# Patient Record
Sex: MALE | Race: WHITE | NOT HISPANIC OR LATINO | Employment: OTHER | URBAN - METROPOLITAN AREA
[De-identification: names, ages, dates, MRNs, and addresses within clinical notes are randomized per-mention and may not be internally consistent; named-entity substitution may affect disease eponyms.]

---

## 2017-01-22 ENCOUNTER — HOSPITAL ENCOUNTER (EMERGENCY)
Facility: HOSPITAL | Age: 47
Discharge: HOME | End: 2017-01-22
Attending: EMERGENCY MEDICINE
Payer: COMMERCIAL

## 2017-01-22 VITALS
HEART RATE: 84 BPM | WEIGHT: 205 LBS | OXYGEN SATURATION: 95 % | SYSTOLIC BLOOD PRESSURE: 146 MMHG | BODY MASS INDEX: 31.07 KG/M2 | HEIGHT: 68 IN | RESPIRATION RATE: 18 BRPM | DIASTOLIC BLOOD PRESSURE: 90 MMHG | TEMPERATURE: 99 F

## 2017-01-22 DIAGNOSIS — N20.0 NEPHROLITHIASIS: Primary | ICD-10-CM

## 2017-01-22 DIAGNOSIS — N23 RENAL COLIC ON LEFT SIDE: ICD-10-CM

## 2017-01-22 LAB
BILIRUB UR QL STRIP: NEGATIVE
CLARITY UR: CLEAR
COLOR UR: NORMAL
GLUCOSE UR QL STRIP: NEGATIVE
HGB UR QL STRIP: NEGATIVE
KETONES UR QL STRIP: NEGATIVE
LEUKOCYTE ESTERASE UR QL STRIP: NEGATIVE
NITRITE UR QL STRIP: NEGATIVE
PH UR STRIP: 6 [PH] (ref 5–8)
PROT UR QL STRIP: NEGATIVE
SP GR UR STRIP: 1 (ref 1–1.03)
URN SPEC COLLECT METH UR: NORMAL
UROBILINOGEN UR STRIP-ACNC: NEGATIVE EU/DL

## 2017-01-22 PROCEDURE — 99284 EMERGENCY DEPT VISIT MOD MDM: CPT

## 2017-01-22 PROCEDURE — 81003 URINALYSIS AUTO W/O SCOPE: CPT

## 2017-01-22 PROCEDURE — 25000003 PHARM REV CODE 250: Performed by: PHYSICIAN ASSISTANT

## 2017-01-22 RX ORDER — TAMSULOSIN HYDROCHLORIDE 0.4 MG/1
0.4 CAPSULE ORAL DAILY
Qty: 10 CAPSULE | Refills: 0 | Status: SHIPPED | OUTPATIENT
Start: 2017-01-22 | End: 2018-09-18 | Stop reason: SDUPTHER

## 2017-01-22 RX ORDER — ONDANSETRON 4 MG/1
4 TABLET, ORALLY DISINTEGRATING ORAL
Status: COMPLETED | OUTPATIENT
Start: 2017-01-22 | End: 2017-01-22

## 2017-01-22 RX ORDER — OMEPRAZOLE 20 MG/1
20 CAPSULE, DELAYED RELEASE ORAL DAILY
COMMUNITY
End: 2018-04-11 | Stop reason: SDUPTHER

## 2017-01-22 RX ORDER — IBUPROFEN 200 MG
200 TABLET ORAL EVERY 6 HOURS PRN
COMMUNITY
End: 2018-12-18

## 2017-01-22 RX ORDER — HYDROCODONE BITARTRATE AND ACETAMINOPHEN 5; 325 MG/1; MG/1
1 TABLET ORAL EVERY 6 HOURS PRN
Qty: 10 TABLET | Refills: 0 | Status: SHIPPED | OUTPATIENT
Start: 2017-01-22 | End: 2018-04-11 | Stop reason: SDUPTHER

## 2017-01-22 RX ORDER — HYDROCODONE BITARTRATE AND ACETAMINOPHEN 10; 325 MG/1; MG/1
1 TABLET ORAL
Status: COMPLETED | OUTPATIENT
Start: 2017-01-22 | End: 2017-01-22

## 2017-01-22 RX ORDER — KETOROLAC TROMETHAMINE 10 MG/1
10 TABLET, FILM COATED ORAL EVERY 6 HOURS
Qty: 6 TABLET | Refills: 0 | Status: SHIPPED | OUTPATIENT
Start: 2017-01-22 | End: 2018-09-18 | Stop reason: SDUPTHER

## 2017-01-22 RX ADMIN — HYDROCODONE BITARTRATE AND ACETAMINOPHEN 1 TABLET: 10; 325 TABLET ORAL at 06:01

## 2017-01-22 RX ADMIN — ONDANSETRON 4 MG: 4 TABLET, ORALLY DISINTEGRATING ORAL at 06:01

## 2017-01-22 NOTE — ED TRIAGE NOTES
"Pt with c/o left low back pain that radiates to left low abdomen, frequent urination,  and nausea x 2 days.  Pt states, "I have a kidney stone."   "

## 2017-01-22 NOTE — ED AVS SNAPSHOT
[facility]  [place] [address]               Tommy Aquinolouisa Mobley.   2017  5:30 PM   ED    Description:  Male : 1970   Department:  [facility]           Your Care was Coordinated By:     Provider Role From To    Dionte Fowler MD Attending Provider [phone] --    Scott Davis PA-C Physician Assistant 17 --      Reason for Visit     Flank Pain           Diagnoses this Visit        Comments    Nephrolithiasis    -  Primary     Renal colic on left side           ED Disposition     None           To Do List           Follow-up Information     Go to [facility].    Specialty:  Emergency Medicine    Why:  If symptoms worsen    Contact information:    [place] [id number] [phone]        Go to Urology.    Why:  As already scheduled       These Medications        Disp Refills Start End    tamsulosin (FLOMAX) 0.4 mg Cp24 10 capsule 0 2017    Take 1 capsule (0.4 mg total) by mouth once daily. - Oral    Pharmacy: [facility] [place] - [address] Ph #: [phone]       ketorolac (TORADOL) 10 mg tablet 6 tablet 0 2017     Take 1 tablet (10 mg total) by mouth every 6 (six) hours. - Oral    Pharmacy: [facility] [place] - [address] Ph #: [phone]       hydrocodone-acetaminophen 5-325mg (NORCO) 5-325 mg per tablet 10 tablet 0 2017     Take 1 tablet by mouth every 6 (six) hours as needed for Pain. - Oral    Pharmacy: [facility] [place] - [address] Ph #: [phone]         [facility]     [facility] -  Assistance  Registered nurses in the [facility] provide clinical advisement, health education, appointment booking, and other advisory services.  Call for this free service at [phone].              Medications           Message regarding Medications     Verify the changes and/or additions to your medication regime listed below are the same as discussed with your clinician today.  If any of these changes or additions are incorrect, please notify your healthcare provider.        START taking these NEW medications        Refills    tamsulosin (FLOMAX) 0.4 mg Cp24 0    Sig: Take 1 capsule (0.4 mg total) by mouth once daily.    Class: Print    Route: Oral    ketorolac (TORADOL) 10 mg tablet 0    Sig: Take 1 tablet (10 mg total) by mouth every 6 (six) hours.    Class: Normal    Route: Oral    hydrocodone-acetaminophen 5-325mg (NORCO) 5-325 mg per tablet 0    Sig: Take 1 tablet by mouth every 6 (six) hours as needed for Pain.    Class: Print    Route: Oral      These medications were administered today        Dose Freq    ondansetron disintegrating tablet 4 mg 4 mg ED 1 Time    Sig: Take 1 tablet (4 mg total) by mouth ED 1 Time.    Class: Normal    Route: Oral    Cosign for Ordering: Required by Dionte Fowler MD    hydrocodone-acetaminophen 10-325mg per tablet 1 tablet 1 tablet ED 1 Time    Sig: Take 1 tablet by mouth ED 1 Time.    Class: Normal    Route: Oral    Cosign for Ordering: Required by Dionte Fowler MD      STOP taking these medications     allopurinol (ZYLOPRIM) 300 MG tablet TAKE ONE TABLET BY MOUTH EVERY DAY *TAKE WITH PLENTY WATER* *TAKE WITH SNACK IF UPSETS STOMACH*    clarithromycin (BIAXIN) 500 MG tablet ONE TABLET BY MOUTH EVERY 12 HOURS AFTER MEALS           Verify that the below list of medications is an accurate representation of the medications you are currently taking.  If none reported, the list may be blank. If incorrect, please contact your healthcare provider. Carry this list with you in case of emergency.           Current Medications     ibuprofen (ADVIL) 200 MG tablet Take 200 mg by mouth every 6 (six) hours as needed for Pain.    omeprazole (PRILOSEC) 20 MG capsule  "Take 20 mg by mouth once daily.    colchicine 0.6 mg tablet Take 1 tablet (0.6 mg total) by mouth once daily.    hydrocodone-acetaminophen 5-325mg (NORCO) 5-325 mg per tablet Take 1 tablet by mouth every 6 (six) hours as needed for Pain.    ketorolac (TORADOL) 10 mg tablet Take 1 tablet (10 mg total) by mouth every 6 (six) hours.    tamsulosin (FLOMAX) 0.4 mg Cp24 Take 1 capsule (0.4 mg total) by mouth once daily.           Clinical Reference Information           Your Vitals Were     BP Pulse Temp Resp Height Weight    175/93 (BP Location: Left arm, Patient Position: Sitting) 98 97.9 °F (36.6 °C) (Oral) 18 5' 8" (1.727 m) 93 kg (205 lb)    SpO2 BMI             97% 31.17 kg/m2         Allergies as of 1/22/2017        Reactions    Pcn [Penicillins] Rash      Immunizations Administered on Date of Encounter - 1/22/2017     None      ED Micro, Lab, POCT     Start Ordered       Status Ordering Provider    01/22/17 1647 01/22/17 1646  Urinalysis  STAT      Final result       ED Imaging Orders     Start Ordered       Status Ordering Provider    01/22/17 1652 01/22/17 1651  CT Abdomen Pelvis  Without Contrast  1 time imaging     Comments:  Hx of renal stones    Final result         Discharge Instructions       Please take the Toradol prescription as needed for pain.  Do not take ibuprofen with Toradol.  If Toradol does not manage your pain, you may take the hydrocodone with Tylenol.    Discharge References/Attachments     KIDNEY STONE W/ COLIC (ENGLISH)    KIDNEY STONE, PASSED (ENGLISH)    KIDNEY STONE, UNDESCENDED (NO SYMPTOMS) (ENGLISH)    KIDNEY STONES, TREATING: MEDICATIONS (ENGLISH)      [facility] Sign-Up     Activating your [facility] account is as easy as 1-2-3!     1) Visit [email or web address], select Sign Up Now, enter this activation code and your date of birth, then select Next.  Activation code not generated  Current Patient Portal Status: Account disabled      2) Create a username and password to use when you visit " [facility] in the future and select a security question in case you lose your password and select Next.    3) Enter your e-mail address and click Sign Up!    Additional Information  If you have questions, please e-mail [email or web address] or call [phone] to talk to our [facility] staff. Remember, [facility] is NOT to be used for urgent needs. For medical emergencies, dial 911.          [facility] complies with applicable Federal civil rights laws and does not discriminate on the basis of race, color, national origin, age, disability, or sex.        Language Assistance Services     ATTENTION: Language assistance services are available, free of charge. Please call [phone].      ATENCIÓN: Si jannala erlinda, tiene a fry disposición servicios gratuitos de asistencia lingüística. Llame al [phone].     CHÚ Ý: N?u b?n nói Ti?ng Vi?t, có các d?ch v? h? tr? ngôn ng? mi?n phí dành cho b?n. G?i s? [phone].

## 2017-01-23 NOTE — DISCHARGE INSTRUCTIONS
Please take the Toradol prescription as needed for pain.  Do not take ibuprofen with Toradol.  If Toradol does not manage your pain, you may take the hydrocodone with Tylenol.

## 2017-01-23 NOTE — ED PROVIDER NOTES
"Encounter Date: 1/22/2017    SCRIBE #1 NOTE: I, Kristienaya Russo, am scribing for, and in the presence of,  Scott Davis PA-C. I have scribed the following portions of the note - Other sections scribed: HPI and ROS.       History     Chief Complaint   Patient presents with    Flank Pain     L side flank pain, "Kidney stone." Hx of kidney stones.      Review of patient's allergies indicates:   Allergen Reactions    Pcn [penicillins] Rash     HPI Comments: CC: Back Pain    HPI: This 46 y.o. male with medical history of gout and nephrolithiasis presents to the ED c/o acute, severe (8/10) left lower back pain that began on Friday (1/20/17). Pt reports that the symptoms have since traveled to the left groin and notes that movement exacerbates the pain. He describes the symptoms as intermittent, noting that the pain improved yesterday, but returned today. Pt reports taking 2x tablets of Advil PTA to the ED. He also c/o nausea. Pt reports a history of kidney stones, noting that he has passed them in the past. He adds that he had an x-ray preformed on Friday after calling his Urologist, but notes that he has not received the results. Pt denies fever and recent injuries. No other associated symptoms.         The history is provided by the patient. No  was used.     Past Medical History   Diagnosis Date    Gout     Nephrolithiasis      No past medical history pertinent negatives.  Past Surgical History   Procedure Laterality Date    Hernia repair      Kidney surgery      Tonsillectomy       Family History   Problem Relation Age of Onset    Family history unknown: Yes     Social History   Substance Use Topics    Smoking status: Never Smoker    Smokeless tobacco: None    Alcohol use No     Review of Systems   Constitutional: Negative for fever.   HENT: Negative for sore throat.    Respiratory: Negative for shortness of breath.    Cardiovascular: Negative for chest pain.   Gastrointestinal: Positive " for nausea.   Genitourinary: Negative for dysuria.   Musculoskeletal: Positive for back pain (left lower; traveled to the left groin).        (+) left groin pain   Skin: Negative for rash.   Neurological: Negative for weakness.       Physical Exam   Initial Vitals   BP Pulse Resp Temp SpO2   01/22/17 1636 01/22/17 1636 01/22/17 1636 01/22/17 1636 01/22/17 1636   175/93 98 18 97.9 °F (36.6 °C) 97 %     Physical Exam    Vitals reviewed.  Constitutional: He appears well-developed and well-nourished. He is not diaphoretic. No distress.   HENT:   Head: Normocephalic and atraumatic.   Right Ear: External ear normal.   Left Ear: External ear normal.   Nose: Nose normal.   Eyes: Conjunctivae are normal. No scleral icterus.   Neck: Normal range of motion. Neck supple.   Cardiovascular: Normal rate, regular rhythm, normal heart sounds and intact distal pulses.   Pulmonary/Chest: Breath sounds normal. No respiratory distress. He has no wheezes. He has no rhonchi. He has no rales. He exhibits no tenderness.   Abdominal: Soft. He exhibits no distension and no mass. There is tenderness (minimal left lower quadrant). There is no rebound and no guarding.   No CVA tenderness.   Musculoskeletal: Normal range of motion.   Neurological: He is alert and oriented to person, place, and time.   Skin: Skin is warm and dry. No rash noted. No erythema.         ED Course   Procedures  Labs Reviewed   URINALYSIS             Medical Decision Making:   History:   Old Medical Records: I decided to obtain old medical records.    Emergent evaluation a 46-year-old male with history of recurrent kidney stones and gout complains of left flank pain and left lower quadrant pain waxing and waning ×2 days with associated nausea.  He denies dysuria, fever, vomiting.  He presents nontoxic, afebrile with stable vital signs.  He has mild left lower quadrant tenderness with no rebound or peritoneal signs.  There is no CVA tenderness.  Lungs sounds are clear  with normal work of breathing.  Heart sounds normal.  Skin perfusion normal.  Urinalysis has hematuria without evidence of infection.  I doubt pyelonephritis.  CT abdomen and pelvis without contrast shows multiple small nonobstructing bilateral nephrolithiasis in the renal pelvises and possibly one small stone in the left ureter.  No other obvious cause on imaging this point patient's pain. Patient given Norco and Zofran in the ED and discharged with Flomax, Toradol tablets, and Norco.  Patient was advised to drink plenty of fluids.  Patient explains he has urologist appointment in March.  I advised patient to call urology regarding ED visit for possible earlier appointment.  Patient given return precautions.  Case discussed with Dr. Fowler who agrees with assessment and plan.          Scribe Attestation:   Scribe #1: I performed the above scribed service and the documentation accurately describes the services I performed. I attest to the accuracy of the note.    Attending Attestation:           Physician Attestation for Scribe:  Physician Attestation Statement for Scribe #1: I, Scott Davis PA-C, reviewed documentation, as scribed by Kristie Russo in my presence, and it is both accurate and complete.                 ED Course     Clinical Impression:   The primary encounter diagnosis was Nephrolithiasis. A diagnosis of Renal colic on left side was also pertinent to this visit.          Scott Davis PA-C  01/22/17 2014

## 2017-02-17 DIAGNOSIS — M10.071 ACUTE IDIOPATHIC GOUT OF RIGHT FOOT: ICD-10-CM

## 2017-02-20 RX ORDER — COLCHICINE 0.6 MG/1
TABLET ORAL
Qty: 30 TABLET | Refills: 1 | Status: SHIPPED | OUTPATIENT
Start: 2017-02-20 | End: 2018-04-11 | Stop reason: SDUPTHER

## 2018-04-11 ENCOUNTER — OFFICE VISIT (OUTPATIENT)
Dept: FAMILY MEDICINE | Facility: CLINIC | Age: 48
End: 2018-04-11
Payer: COMMERCIAL

## 2018-04-11 VITALS
TEMPERATURE: 100 F | DIASTOLIC BLOOD PRESSURE: 80 MMHG | HEIGHT: 68 IN | WEIGHT: 208.56 LBS | OXYGEN SATURATION: 98 % | SYSTOLIC BLOOD PRESSURE: 116 MMHG | BODY MASS INDEX: 31.61 KG/M2 | HEART RATE: 86 BPM

## 2018-04-11 DIAGNOSIS — K21.9 GASTROESOPHAGEAL REFLUX DISEASE, ESOPHAGITIS PRESENCE NOT SPECIFIED: ICD-10-CM

## 2018-04-11 DIAGNOSIS — M10.9 ACUTE GOUT OF LEFT FOOT, UNSPECIFIED CAUSE: ICD-10-CM

## 2018-04-11 DIAGNOSIS — M10.071 ACUTE IDIOPATHIC GOUT OF RIGHT FOOT: ICD-10-CM

## 2018-04-11 DIAGNOSIS — Z00.00 ANNUAL PHYSICAL EXAM: Primary | ICD-10-CM

## 2018-04-11 DIAGNOSIS — R09.82 PND (POST-NASAL DRIP): ICD-10-CM

## 2018-04-11 PROCEDURE — 99999 PR PBB SHADOW E&M-EST. PATIENT-LVL III: CPT | Mod: PBBFAC,,, | Performed by: FAMILY MEDICINE

## 2018-04-11 PROCEDURE — 99396 PREV VISIT EST AGE 40-64: CPT | Mod: S$GLB,,, | Performed by: FAMILY MEDICINE

## 2018-04-11 RX ORDER — FLUTICASONE PROPIONATE 50 MCG
SPRAY, SUSPENSION (ML) NASAL
COMMUNITY
Start: 2018-03-26 | End: 2020-07-16 | Stop reason: ALTCHOICE

## 2018-04-11 RX ORDER — OMEPRAZOLE 40 MG/1
40 CAPSULE, DELAYED RELEASE ORAL DAILY
Qty: 90 CAPSULE | Refills: 3 | Status: SHIPPED | OUTPATIENT
Start: 2018-04-11 | End: 2019-04-17 | Stop reason: SDUPTHER

## 2018-04-11 RX ORDER — AZELASTINE 1 MG/ML
1 SPRAY, METERED NASAL 2 TIMES DAILY
Qty: 30 ML | Refills: 0 | Status: SHIPPED | OUTPATIENT
Start: 2018-04-11 | End: 2018-12-24

## 2018-04-11 RX ORDER — COLCHICINE 0.6 MG/1
0.6 TABLET ORAL DAILY
Qty: 30 TABLET | Refills: 1 | Status: SHIPPED | OUTPATIENT
Start: 2018-04-11 | End: 2018-12-24

## 2018-04-11 NOTE — PROGRESS NOTES
"Chief Complaint   Patient presents with    Medication Refill     SUBJECTIVE:   Tommy Lamar . is a 48 y.o. male presenting for his annual checkup.  Current Outpatient Prescriptions   Medication Sig Dispense Refill    colchicine 0.6 mg tablet Take 1 tablet (0.6 mg total) by mouth once daily. 30 tablet 1    fluticasone (FLONASE) 50 mcg/actuation nasal spray       ibuprofen (ADVIL) 200 MG tablet Take 200 mg by mouth every 6 (six) hours as needed for Pain.      ketorolac (TORADOL) 10 mg tablet Take 1 tablet (10 mg total) by mouth every 6 (six) hours. 6 tablet 0    omeprazole (PRILOSEC) 40 MG capsule Take 1 capsule (40 mg total) by mouth once daily. 90 capsule 3    azelastine (ASTELIN) 137 mcg (0.1 %) nasal spray 1 spray (137 mcg total) by Nasal route 2 (two) times daily. 30 mL 0    tamsulosin (FLOMAX) 0.4 mg Cp24 Take 1 capsule (0.4 mg total) by mouth once daily. 10 capsule 0     No current facility-administered medications for this visit.      Allergies: Pcn [penicillins]     ROS:  Feeling well. No dyspnea or chest pain on exertion. No abdominal pain, change in bowel habits, black or bloody stools. No urinary tract or prostatic symptoms. No neurological complaints.    OBJECTIVE:   The patient appears well, alert, oriented x 3, in no distress.   /80   Pulse 86   Temp 99.5 °F (37.5 °C) (Oral)   Ht 5' 8" (1.727 m)   Wt 94.6 kg (208 lb 8.9 oz)   SpO2 98%   BMI 31.71 kg/m²      Wt Readings from Last 15 Encounters:   04/11/18 94.6 kg (208 lb 8.9 oz)   01/22/17 93 kg (205 lb)   09/30/16 96.3 kg (212 lb 4.9 oz)   06/06/16 96.1 kg (211 lb 13.8 oz)   01/25/16 94.2 kg (207 lb 10.8 oz)   05/06/15 95.7 kg (211 lb)   02/12/15 97.1 kg (214 lb)   12/19/14 95.3 kg (210 lb)       ENT normal.  Neck supple. No adenopathy or thyromegaly. PARVEEN. Lungs are clear, good air entry, no wheezes, rhonchi or rales. S1 and S2 normal, no murmurs, regular rate and rhythm. Abdomen is soft without tenderness, guarding, mass or " organomegaly.  exam: deferred.  Extremities show no edema, normal peripheral pulses. Neurological is normal without focal findings.    ASSESSMENT:   1. Annual physical exam    2. Gastroesophageal reflux disease, esophagitis presence not specified    3. Acute gout of left foot, unspecified cause    4. Acute idiopathic gout of right foot    5. PND (post-nasal drip)        PLAN:   Tommy was seen today for medication refill.    Diagnoses and all orders for this visit:    Annual physical exam  -     CBC auto differential; Standing  -     Comprehensive metabolic panel; Standing  -     Hemoglobin A1c; Standing  -     Lipid panel; Standing  -     TSH; Standing  -     Uric acid; Future  -     Urinalysis; Standing  -     Testosterone Panel; Future    Gastroesophageal reflux disease, esophagitis presence not specified    Acute gout of left foot, unspecified cause    Acute idiopathic gout of right foot  -     colchicine 0.6 mg tablet; Take 1 tablet (0.6 mg total) by mouth once daily.    PND (post-nasal drip)  -     azelastine (ASTELIN) 137 mcg (0.1 %) nasal spray; 1 spray (137 mcg total) by Nasal route 2 (two) times daily.    Other orders  -     omeprazole (PRILOSEC) 40 MG capsule; Take 1 capsule (40 mg total) by mouth once daily.  -     Cancel: Tdap Vaccine  -     Cancel: Lipid panel; Future

## 2018-08-20 DIAGNOSIS — Z12.11 COLON CANCER SCREENING: ICD-10-CM

## 2018-08-30 ENCOUNTER — LAB VISIT (OUTPATIENT)
Dept: LAB | Facility: HOSPITAL | Age: 48
End: 2018-08-30
Attending: FAMILY MEDICINE
Payer: COMMERCIAL

## 2018-08-30 DIAGNOSIS — Z00.00 ANNUAL PHYSICAL EXAM: ICD-10-CM

## 2018-08-30 LAB
ALBUMIN SERPL BCP-MCNC: 3.8 G/DL
ALP SERPL-CCNC: 70 U/L
ALT SERPL W/O P-5'-P-CCNC: 20 U/L
ANION GAP SERPL CALC-SCNC: 10 MMOL/L
AST SERPL-CCNC: 16 U/L
BASOPHILS # BLD AUTO: 0.03 K/UL
BASOPHILS NFR BLD: 0.6 %
BILIRUB SERPL-MCNC: 0.6 MG/DL
BUN SERPL-MCNC: 15 MG/DL
CALCIUM SERPL-MCNC: 9.1 MG/DL
CHLORIDE SERPL-SCNC: 106 MMOL/L
CHOLEST SERPL-MCNC: 141 MG/DL
CHOLEST/HDLC SERPL: 3.5 {RATIO}
CO2 SERPL-SCNC: 23 MMOL/L
CREAT SERPL-MCNC: 1.2 MG/DL
DIFFERENTIAL METHOD: ABNORMAL
EOSINOPHIL # BLD AUTO: 0.1 K/UL
EOSINOPHIL NFR BLD: 2.5 %
ERYTHROCYTE [DISTWIDTH] IN BLOOD BY AUTOMATED COUNT: 12.5 %
EST. GFR  (AFRICAN AMERICAN): >60 ML/MIN/1.73 M^2
EST. GFR  (NON AFRICAN AMERICAN): >60 ML/MIN/1.73 M^2
ESTIMATED AVG GLUCOSE: 100 MG/DL
GLUCOSE SERPL-MCNC: 83 MG/DL
HBA1C MFR BLD HPLC: 5.1 %
HCT VFR BLD AUTO: 40 %
HDLC SERPL-MCNC: 40 MG/DL
HDLC SERPL: 28.4 %
HGB BLD-MCNC: 13.9 G/DL
LDLC SERPL CALC-MCNC: 77.6 MG/DL
LYMPHOCYTES # BLD AUTO: 1.7 K/UL
LYMPHOCYTES NFR BLD: 32.8 %
MCH RBC QN AUTO: 30.4 PG
MCHC RBC AUTO-ENTMCNC: 34.8 G/DL
MCV RBC AUTO: 88 FL
MONOCYTES # BLD AUTO: 0.4 K/UL
MONOCYTES NFR BLD: 8.5 %
NEUTROPHILS # BLD AUTO: 2.9 K/UL
NEUTROPHILS NFR BLD: 55.6 %
NONHDLC SERPL-MCNC: 101 MG/DL
PLATELET # BLD AUTO: 192 K/UL
PMV BLD AUTO: 9 FL
POTASSIUM SERPL-SCNC: 4.1 MMOL/L
PROT SERPL-MCNC: 7.7 G/DL
RBC # BLD AUTO: 4.57 M/UL
SODIUM SERPL-SCNC: 139 MMOL/L
TRIGL SERPL-MCNC: 117 MG/DL
TSH SERPL DL<=0.005 MIU/L-ACNC: 1.03 UIU/ML
URATE SERPL-MCNC: 10.3 MG/DL
WBC # BLD AUTO: 5.16 K/UL

## 2018-08-30 PROCEDURE — 84550 ASSAY OF BLOOD/URIC ACID: CPT

## 2018-08-30 PROCEDURE — 36415 COLL VENOUS BLD VENIPUNCTURE: CPT | Mod: PO

## 2018-08-30 PROCEDURE — 84443 ASSAY THYROID STIM HORMONE: CPT

## 2018-08-30 PROCEDURE — 84270 ASSAY OF SEX HORMONE GLOBUL: CPT

## 2018-08-30 PROCEDURE — 85025 COMPLETE CBC W/AUTO DIFF WBC: CPT

## 2018-08-30 PROCEDURE — 80053 COMPREHEN METABOLIC PANEL: CPT

## 2018-08-30 PROCEDURE — 80061 LIPID PANEL: CPT

## 2018-08-30 PROCEDURE — 82040 ASSAY OF SERUM ALBUMIN: CPT

## 2018-08-30 PROCEDURE — 83036 HEMOGLOBIN GLYCOSYLATED A1C: CPT

## 2018-08-31 ENCOUNTER — TELEPHONE (OUTPATIENT)
Dept: FAMILY MEDICINE | Facility: CLINIC | Age: 48
End: 2018-08-31

## 2018-08-31 NOTE — TELEPHONE ENCOUNTER
----- Message from Tracey Wilde sent at 8/31/2018  9:14 AM CDT -----  Contact: Self/ [phone]  Pt calling for lab results from yesterday's OV. Thank you.

## 2018-09-02 LAB
ALBUMIN SERPL-MCNC: 4.3 G/DL (ref 3.6–5.1)
SHBG SERPL-SCNC: 17 NMOL/L (ref 10–50)
TESTOST FREE SERPL-MCNC: 49.7 PG/ML (ref 46–224)
TESTOST SERPL-MCNC: 242 NG/DL (ref 250–1100)
TESTOSTERONE.FREE+WB SERPL-MCNC: 97.8 NG/DL (ref 110–575)

## 2018-09-04 ENCOUNTER — TELEPHONE (OUTPATIENT)
Dept: FAMILY MEDICINE | Facility: CLINIC | Age: 48
End: 2018-09-04

## 2018-09-04 NOTE — TELEPHONE ENCOUNTER
Called patient and informed to come in to go over results. Scheduled patient to f/u 9/18/18 @ 3pm. Informed patient that labs are not urgent. Patient was concerned.     Just FYI.

## 2018-09-04 NOTE — TELEPHONE ENCOUNTER
----- Message from Negro Bell MD sent at 9/2/2018 11:00 AM CDT -----  Make him and appointment to go over labs

## 2018-09-10 ENCOUNTER — OFFICE VISIT (OUTPATIENT)
Dept: FAMILY MEDICINE | Facility: CLINIC | Age: 48
End: 2018-09-10
Payer: COMMERCIAL

## 2018-09-10 VITALS
WEIGHT: 208.75 LBS | BODY MASS INDEX: 31.74 KG/M2 | SYSTOLIC BLOOD PRESSURE: 150 MMHG | HEART RATE: 72 BPM | OXYGEN SATURATION: 97 % | DIASTOLIC BLOOD PRESSURE: 84 MMHG

## 2018-09-10 DIAGNOSIS — M10.332 ACUTE GOUT DUE TO RENAL IMPAIRMENT INVOLVING LEFT WRIST: ICD-10-CM

## 2018-09-10 DIAGNOSIS — M10.9 ACUTE GOUT OF RIGHT FOOT, UNSPECIFIED CAUSE: Primary | ICD-10-CM

## 2018-09-10 DIAGNOSIS — Z71.2 ENCOUNTER TO DISCUSS TEST RESULTS: ICD-10-CM

## 2018-09-10 DIAGNOSIS — M1A.09X0 CHRONIC GOUT OF MULTIPLE SITES, UNSPECIFIED CAUSE: ICD-10-CM

## 2018-09-10 PROCEDURE — 99214 OFFICE O/P EST MOD 30 MIN: CPT | Mod: 25,S$GLB,, | Performed by: INTERNAL MEDICINE

## 2018-09-10 PROCEDURE — 96372 THER/PROPH/DIAG INJ SC/IM: CPT | Mod: S$GLB,,, | Performed by: INTERNAL MEDICINE

## 2018-09-10 PROCEDURE — 99999 PR PBB SHADOW E&M-EST. PATIENT-LVL III: CPT | Mod: PBBFAC,,, | Performed by: INTERNAL MEDICINE

## 2018-09-10 PROCEDURE — 3008F BODY MASS INDEX DOCD: CPT | Mod: CPTII,S$GLB,, | Performed by: INTERNAL MEDICINE

## 2018-09-10 RX ORDER — HYDROCODONE BITARTRATE AND ACETAMINOPHEN 5; 325 MG/1; MG/1
1 TABLET ORAL EVERY 6 HOURS PRN
Refills: 0 | COMMUNITY
Start: 2018-06-29 | End: 2018-12-18

## 2018-09-10 RX ORDER — TRAMADOL HYDROCHLORIDE 50 MG/1
50 TABLET ORAL EVERY 6 HOURS
Refills: 0 | COMMUNITY
Start: 2018-08-21 | End: 2018-12-18

## 2018-09-10 RX ORDER — KETOROLAC TROMETHAMINE 30 MG/ML
60 INJECTION, SOLUTION INTRAMUSCULAR; INTRAVENOUS
Status: COMPLETED | OUTPATIENT
Start: 2018-09-10 | End: 2018-09-10

## 2018-09-10 RX ORDER — METHYLPREDNISOLONE 4 MG/1
TABLET ORAL
Refills: 0 | COMMUNITY
Start: 2018-08-21 | End: 2018-09-18 | Stop reason: SDUPTHER

## 2018-09-10 RX ORDER — FEBUXOSTAT 40 MG/1
40 TABLET, FILM COATED ORAL DAILY
Qty: 90 TABLET | Refills: 1 | Status: SHIPPED | OUTPATIENT
Start: 2018-09-10 | End: 2018-09-18 | Stop reason: SDUPTHER

## 2018-09-10 RX ADMIN — KETOROLAC TROMETHAMINE 60 MG: 30 INJECTION, SOLUTION INTRAMUSCULAR; INTRAVENOUS at 03:09

## 2018-09-10 NOTE — PROGRESS NOTES
SUBJECTIVE     Chief Complaint   Patient presents with    Wrist Pain     L wrist pain. Went to . was given streroid injection. Helped but pain came back     Foot Pain     R top of foot pain       HPI  Tommy Lamar . is a 48 y.o. male with multiple medical diagnoses as listed in the medical history and problem list that presents for evaluation of L wrist pain x 2-3 weeks and R foot pain. Pt went to urgent care and had a negative xray. He told them he likely had gout, so he received a steroid shot, Toradol shot, and Medrol dose pack which he took to completion. The pain then resolved, but returned 2 days later. It is now accompanied by R foot pain that is achy at a 6/10. He has been taking Advil with some relief of pain.     PAST MEDICAL HISTORY:  Past Medical History:   Diagnosis Date    Gout     Nephrolithiasis        PAST SURGICAL HISTORY:  Past Surgical History:   Procedure Laterality Date    HERNIA REPAIR      KIDNEY SURGERY      TONSILLECTOMY         SOCIAL HISTORY:  Social History     Socioeconomic History    Marital status:      Spouse name: Not on file    Number of children: Not on file    Years of education: Not on file    Highest education level: Not on file   Social Needs    Financial resource strain: Not on file    Food insecurity - worry: Not on file    Food insecurity - inability: Not on file    Transportation needs - medical: Not on file    Transportation needs - non-medical: Not on file   Occupational History    Not on file   Tobacco Use    Smoking status: Never Smoker   Substance and Sexual Activity    Alcohol use: No    Drug use: Not on file    Sexual activity: Yes     Partners: Female     Birth control/protection: None   Other Topics Concern    Not on file   Social History Narrative    Not on file       FAMILY HISTORY:  Family History   Family history unknown: Yes       ALLERGIES AND MEDICATIONS: updated and reviewed.  Review of patient's allergies indicates:    Allergen Reactions    Pcn [penicillins] Rash     Current Outpatient Medications   Medication Sig Dispense Refill    azelastine (ASTELIN) 137 mcg (0.1 %) nasal spray 1 spray (137 mcg total) by Nasal route 2 (two) times daily. 30 mL 0    colchicine 0.6 mg tablet Take 1 tablet (0.6 mg total) by mouth once daily. 30 tablet 1    febuxostat (ULORIC) 40 mg Tab Take 1 tablet (40 mg total) by mouth once daily. 90 tablet 1    fluticasone (FLONASE) 50 mcg/actuation nasal spray       HYDROcodone-acetaminophen (NORCO) 5-325 mg per tablet Take 1 tablet by mouth every 6 (six) hours as needed.  0    ibuprofen (ADVIL) 200 MG tablet Take 200 mg by mouth every 6 (six) hours as needed for Pain.      ketorolac (TORADOL) 10 mg tablet Take 1 tablet (10 mg total) by mouth every 6 (six) hours. 6 tablet 0    methylPREDNISolone (MEDROL DOSEPACK) 4 mg tablet TAKE AS DIRECTED  0    omeprazole (PRILOSEC) 40 MG capsule Take 1 capsule (40 mg total) by mouth once daily. 90 capsule 3    tamsulosin (FLOMAX) 0.4 mg Cp24 Take 1 capsule (0.4 mg total) by mouth once daily. 10 capsule 0    traMADol (ULTRAM) 50 mg tablet Take 50 mg by mouth every 6 (six) hours.  0     No current facility-administered medications for this visit.        ROS  Review of Systems   Constitutional: Negative for chills and fever.   HENT: Negative for hearing loss and sore throat.    Eyes: Negative for visual disturbance.   Respiratory: Negative for cough and shortness of breath.    Cardiovascular: Negative for chest pain, palpitations and leg swelling.   Gastrointestinal: Negative for abdominal pain, constipation, diarrhea, nausea and vomiting.   Genitourinary: Negative for dysuria, frequency and urgency.   Musculoskeletal: Positive for arthralgias (L wrist and R foot pain). Negative for joint swelling and myalgias.   Skin: Negative for rash and wound.   Neurological: Negative for headaches.   Psychiatric/Behavioral: Negative for agitation and confusion. The patient  is not nervous/anxious.          OBJECTIVE     Physical Exam  Vitals:    09/10/18 1435   BP: (!) 150/84   Pulse: 72    Body mass index is 31.74 kg/m².  Weight: 94.7 kg (208 lb 12.4 oz)         Physical Exam   Constitutional: He is oriented to person, place, and time. He appears well-developed and well-nourished. No distress.   HENT:   Head: Normocephalic and atraumatic.   Right Ear: External ear normal.   Left Ear: External ear normal.   Nose: Nose normal.   Mouth/Throat: Oropharynx is clear and moist.   Eyes: Conjunctivae and EOM are normal. Right eye exhibits no discharge. Left eye exhibits no discharge. No scleral icterus.   Neck: Normal range of motion. Neck supple. No JVD present. No tracheal deviation present.   Pulmonary/Chest: Effort normal. No respiratory distress.   Musculoskeletal: Normal range of motion. He exhibits edema (L wrist) and tenderness. He exhibits no deformity.   Neurological: He is alert and oriented to person, place, and time. He exhibits normal muscle tone. Coordination normal.   Skin: Skin is warm and dry. No rash noted. There is erythema (Erythema to skin overlying 4th MTP).   Psychiatric: He has a normal mood and affect. His behavior is normal. Judgment and thought content normal.         Health Maintenance       Date Due Completion Date    TETANUS VACCINE 01/23/1986 ---    Colonoscopy 01/23/2018 ---    Influenza Vaccine 08/01/2018 ---    Lipid Panel 08/30/2023 8/30/2018            ASSESSMENT     48 y.o. male with     1. Acute gout of right foot, unspecified cause    2. Acute gout due to renal impairment involving left wrist    3. Chronic gout of multiple sites, unspecified cause    4. Encounter to discuss test results        PLAN:     1. Acute gout of right foot, unspecified cause  - Pt to start Indomethacin as prescribed by outside facility tomorrow, since Toradol given today  - ketorolac injection 60 mg; Inject 2 mLs (60 mg total) into the muscle one time.    2. Acute gout due to  renal impairment involving left wrist  - As above  - ketorolac injection 60 mg; Inject 2 mLs (60 mg total) into the muscle one time.    3. Chronic gout of multiple sites, unspecified cause  - Pt to start Uloric after acute flair resolved given elevated uric acid level of >10  - febuxostat (ULORIC) 40 mg Tab; Take 1 tablet (40 mg total) by mouth once daily.  Dispense: 90 tablet; Refill: 1    4. Encounter to discuss test results  - Discussed recent lab results  - All questions/concerns addressed  - Pt voiced understanding        RTC in 1-2 weeks as needed for any acute worsening of current condition or failure to improve       Radha Mills MD  09/10/2018 2:45 PM        No Follow-up on file.

## 2018-09-18 ENCOUNTER — OFFICE VISIT (OUTPATIENT)
Dept: FAMILY MEDICINE | Facility: CLINIC | Age: 48
End: 2018-09-18
Payer: COMMERCIAL

## 2018-09-18 VITALS
SYSTOLIC BLOOD PRESSURE: 128 MMHG | BODY MASS INDEX: 29.35 KG/M2 | HEART RATE: 90 BPM | WEIGHT: 205 LBS | OXYGEN SATURATION: 98 % | DIASTOLIC BLOOD PRESSURE: 66 MMHG | HEIGHT: 70 IN | TEMPERATURE: 98 F

## 2018-09-18 DIAGNOSIS — R10.9 RIGHT FLANK PAIN: Primary | ICD-10-CM

## 2018-09-18 DIAGNOSIS — R10.9 LEFT FLANK PAIN: ICD-10-CM

## 2018-09-18 DIAGNOSIS — R11.0 NAUSEA: ICD-10-CM

## 2018-09-18 DIAGNOSIS — Z12.11 SPECIAL SCREENING FOR MALIGNANT NEOPLASMS, COLON: ICD-10-CM

## 2018-09-18 PROCEDURE — 99213 OFFICE O/P EST LOW 20 MIN: CPT | Mod: S$GLB,,, | Performed by: FAMILY MEDICINE

## 2018-09-18 PROCEDURE — 99999 PR PBB SHADOW E&M-EST. PATIENT-LVL III: CPT | Mod: PBBFAC,,, | Performed by: FAMILY MEDICINE

## 2018-09-18 PROCEDURE — 3008F BODY MASS INDEX DOCD: CPT | Mod: CPTII,S$GLB,, | Performed by: FAMILY MEDICINE

## 2018-09-18 RX ORDER — INDOMETHACIN 50 MG/1
CAPSULE ORAL
Refills: 0 | COMMUNITY
Start: 2018-09-10 | End: 2018-09-18

## 2018-09-18 RX ORDER — ALLOPURINOL 300 MG/1
300 TABLET ORAL DAILY
Qty: 90 TABLET | Refills: 3 | Status: SHIPPED | OUTPATIENT
Start: 2018-09-18 | End: 2018-10-01 | Stop reason: ALTCHOICE

## 2018-09-18 RX ORDER — TAMSULOSIN HYDROCHLORIDE 0.4 MG/1
0.4 CAPSULE ORAL DAILY
Qty: 30 CAPSULE | Refills: 1 | Status: SHIPPED | OUTPATIENT
Start: 2018-09-18 | End: 2018-12-24

## 2018-09-18 RX ORDER — OMEPRAZOLE 10 MG/1
CAPSULE, DELAYED RELEASE ORAL
COMMUNITY
End: 2018-09-18

## 2018-09-18 RX ORDER — KETOROLAC TROMETHAMINE 10 MG/1
10 TABLET, FILM COATED ORAL EVERY 6 HOURS
Qty: 12 TABLET | Refills: 0 | Status: SHIPPED | OUTPATIENT
Start: 2018-09-18 | End: 2018-12-18

## 2018-09-18 NOTE — PROGRESS NOTES
"Chief Complaint   Patient presents with    Results     SUBJECTIVE:   Tommy Lamar Sr. is a 48 y.o. male presenting for his annual checkup, but really for his results from the kidney stone and with gout and he is slowly improving.  Current Outpatient Medications   Medication Sig Dispense Refill    azelastine (ASTELIN) 137 mcg (0.1 %) nasal spray 1 spray (137 mcg total) by Nasal route 2 (two) times daily. 30 mL 0    colchicine 0.6 mg tablet Take 1 tablet (0.6 mg total) by mouth once daily. 30 tablet 1    fluticasone (FLONASE) 50 mcg/actuation nasal spray       HYDROcodone-acetaminophen (NORCO) 5-325 mg per tablet Take 1 tablet by mouth every 6 (six) hours as needed.  0    ibuprofen (ADVIL) 200 MG tablet Take 200 mg by mouth every 6 (six) hours as needed for Pain.      ketorolac (TORADOL) 10 mg tablet Take 1 tablet (10 mg total) by mouth every 6 (six) hours. 12 tablet 0    omeprazole (PRILOSEC) 40 MG capsule Take 1 capsule (40 mg total) by mouth once daily. 90 capsule 3    traMADol (ULTRAM) 50 mg tablet Take 50 mg by mouth every 6 (six) hours.  0    allopurinol (ZYLOPRIM) 300 MG tablet Take 1 tablet (300 mg total) by mouth once daily. 90 tablet 3    tamsulosin (FLOMAX) 0.4 mg Cap Take 1 capsule (0.4 mg total) by mouth once daily. 30 capsule 1     No current facility-administered medications for this visit.      Allergies: Pcn [penicillins]     ROS:  Feeling well. No dyspnea or chest pain on exertion. No abdominal pain, change in bowel habits, black or bloody stools. No urinary tract or prostatic symptoms. No neurological complaints.    OBJECTIVE:   The patient appears well, alert, oriented x 3, in no distress.   /66   Pulse 90   Temp 98.4 °F (36.9 °C) (Oral)   Ht 5' 10" (1.778 m)   Wt 93 kg (205 lb 0.4 oz)   SpO2 98%   BMI 29.42 kg/m²   ENT normal.  Neck supple. No adenopathy or thyromegaly. PARVEEN. Lungs are clear, good air entry, no wheezes, rhonchi or rales. S1 and S2 normal, no murmurs, " regular rate and rhythm. Abdomen is soft without tenderness, guarding, mass or organomegaly.  exam: deferred.  Extremities show no edema, normal peripheral pulses. Neurological is normal without focal findings.    ASSESSMENT:   1. Right flank pain    2. Nausea    3. Left flank pain    4. Special screening for malignant neoplasms, colon        PLAN:     He is past the kindey stone and the nausea  We reviewed   F/u in 6 months for wellness.

## 2018-10-01 ENCOUNTER — OFFICE VISIT (OUTPATIENT)
Dept: FAMILY MEDICINE | Facility: CLINIC | Age: 48
End: 2018-10-01
Payer: COMMERCIAL

## 2018-10-01 VITALS
TEMPERATURE: 99 F | WEIGHT: 205 LBS | DIASTOLIC BLOOD PRESSURE: 88 MMHG | BODY MASS INDEX: 29.35 KG/M2 | HEART RATE: 78 BPM | RESPIRATION RATE: 18 BRPM | OXYGEN SATURATION: 97 % | HEIGHT: 70 IN | SYSTOLIC BLOOD PRESSURE: 134 MMHG

## 2018-10-01 DIAGNOSIS — M1A.09X0 CHRONIC GOUT OF MULTIPLE SITES, UNSPECIFIED CAUSE: ICD-10-CM

## 2018-10-01 DIAGNOSIS — M10.362 ACUTE GOUT DUE TO RENAL IMPAIRMENT INVOLVING LEFT KNEE: Primary | ICD-10-CM

## 2018-10-01 PROCEDURE — 3008F BODY MASS INDEX DOCD: CPT | Mod: CPTII,S$GLB,, | Performed by: INTERNAL MEDICINE

## 2018-10-01 PROCEDURE — 99214 OFFICE O/P EST MOD 30 MIN: CPT | Mod: 25,S$GLB,, | Performed by: INTERNAL MEDICINE

## 2018-10-01 PROCEDURE — 96372 THER/PROPH/DIAG INJ SC/IM: CPT | Mod: S$GLB,,, | Performed by: INTERNAL MEDICINE

## 2018-10-01 PROCEDURE — 99999 PR PBB SHADOW E&M-EST. PATIENT-LVL III: CPT | Mod: PBBFAC,,, | Performed by: INTERNAL MEDICINE

## 2018-10-01 RX ORDER — PREDNISONE 50 MG/1
50 TABLET ORAL DAILY
Qty: 3 TABLET | Refills: 0 | Status: SHIPPED | OUTPATIENT
Start: 2018-10-02 | End: 2018-10-05

## 2018-10-01 RX ORDER — KETOROLAC TROMETHAMINE 30 MG/ML
30 INJECTION, SOLUTION INTRAMUSCULAR; INTRAVENOUS
Status: COMPLETED | OUTPATIENT
Start: 2018-10-01 | End: 2018-10-01

## 2018-10-01 RX ADMIN — KETOROLAC TROMETHAMINE 30 MG: 30 INJECTION, SOLUTION INTRAMUSCULAR; INTRAVENOUS at 09:10

## 2018-10-01 NOTE — PROGRESS NOTES
Administered Toradol 30 mg IM to right ventrogluteal.  Patient tolerated injection well, no adverse reactions noted.

## 2018-10-01 NOTE — PROGRESS NOTES
SUBJECTIVE     Chief Complaint   Patient presents with    Knee Pain       HPI  Tommy Lamar Sr. is a 48 y.o. male with multiple medical diagnoses as listed in the medical history and problem list that presents for evaluation of L knee pain since Thursday. He denies any preceding trauma, falls, or heavy lifting. His pain is sharp, stabbing, stabbing, and throbbing at a 9/10 at rest. Pain increases to >10/10 with movement. Pt discontinued his Allopurinol and started Colchicine, Indomethacin, and Advil without relief of pain. Pt reports previous compliance with a low purine diet and Allopurinol.     PAST MEDICAL HISTORY:  Past Medical History:   Diagnosis Date    Gout     Nephrolithiasis        PAST SURGICAL HISTORY:  Past Surgical History:   Procedure Laterality Date    HERNIA REPAIR      KIDNEY SURGERY      TONSILLECTOMY         SOCIAL HISTORY:  Social History     Socioeconomic History    Marital status:      Spouse name: Not on file    Number of children: Not on file    Years of education: Not on file    Highest education level: Not on file   Social Needs    Financial resource strain: Not on file    Food insecurity - worry: Not on file    Food insecurity - inability: Not on file    Transportation needs - medical: Not on file    Transportation needs - non-medical: Not on file   Occupational History    Not on file   Tobacco Use    Smoking status: Never Smoker   Substance and Sexual Activity    Alcohol use: No    Drug use: Not on file    Sexual activity: Yes     Partners: Female     Birth control/protection: None   Other Topics Concern    Not on file   Social History Narrative    Not on file       FAMILY HISTORY:  Family History   Family history unknown: Yes       ALLERGIES AND MEDICATIONS: updated and reviewed.  Review of patient's allergies indicates:   Allergen Reactions    Pcn [penicillins] Rash     Current Outpatient Medications   Medication Sig Dispense Refill    azelastine  (ASTELIN) 137 mcg (0.1 %) nasal spray 1 spray (137 mcg total) by Nasal route 2 (two) times daily. 30 mL 0    colchicine 0.6 mg tablet Take 1 tablet (0.6 mg total) by mouth once daily. 30 tablet 1    fluticasone (FLONASE) 50 mcg/actuation nasal spray       HYDROcodone-acetaminophen (NORCO) 5-325 mg per tablet Take 1 tablet by mouth every 6 (six) hours as needed.  0    ibuprofen (ADVIL) 200 MG tablet Take 200 mg by mouth every 6 (six) hours as needed for Pain.      ketorolac (TORADOL) 10 mg tablet Take 1 tablet (10 mg total) by mouth every 6 (six) hours. 12 tablet 0    lesinurad-allopurinol 200-300 mg Tab Take 1 tablet by mouth once daily. 90 tablet 1    omeprazole (PRILOSEC) 40 MG capsule Take 1 capsule (40 mg total) by mouth once daily. 90 capsule 3    [START ON 10/2/2018] predniSONE (DELTASONE) 50 MG Tab Take 1 tablet (50 mg total) by mouth once daily. for 3 days 3 tablet 0    tamsulosin (FLOMAX) 0.4 mg Cap Take 1 capsule (0.4 mg total) by mouth once daily. 30 capsule 1    traMADol (ULTRAM) 50 mg tablet Take 50 mg by mouth every 6 (six) hours.  0     No current facility-administered medications for this visit.        ROS  Review of Systems   Constitutional: Negative for chills and fever.   HENT: Negative for hearing loss and sore throat.    Eyes: Negative for visual disturbance.   Respiratory: Negative for cough and shortness of breath.    Cardiovascular: Negative for chest pain, palpitations and leg swelling.   Gastrointestinal: Negative for abdominal pain, constipation, diarrhea, nausea and vomiting.   Genitourinary: Negative for dysuria, frequency and urgency.   Musculoskeletal: Positive for arthralgias (L knee) and joint swelling (L knee). Negative for myalgias.   Skin: Negative for rash and wound.   Neurological: Negative for headaches.   Psychiatric/Behavioral: Negative for agitation and confusion. The patient is not nervous/anxious.          OBJECTIVE     Physical Exam  Vitals:    10/01/18 0842  "  BP: 134/88   Pulse: 78   Resp: 18   Temp: 98.5 °F (36.9 °C)    Body mass index is 29.42 kg/m².  Weight: 93 kg (205 lb 0.4 oz)   Height: 5' 10" (177.8 cm)     Physical Exam   Constitutional: He is oriented to person, place, and time. He appears well-developed and well-nourished. No distress.   HENT:   Head: Normocephalic and atraumatic.   Right Ear: External ear normal.   Left Ear: External ear normal.   Nose: Nose normal.   Mouth/Throat: Oropharynx is clear and moist.   Eyes: Conjunctivae and EOM are normal. Right eye exhibits no discharge. Left eye exhibits no discharge. No scleral icterus.   Neck: Normal range of motion. Neck supple. No JVD present. No tracheal deviation present.   Pulmonary/Chest: Effort normal. No respiratory distress.   Musculoskeletal: Normal range of motion. He exhibits edema (L knee with mild edema) and tenderness (L medial knee). He exhibits no deformity.   R knee crepitus   Neurological: He is alert and oriented to person, place, and time. He exhibits normal muscle tone. Coordination normal.   Skin: Skin is warm and dry. No rash noted. No erythema.   L knee slightly more warm to touch   Psychiatric: He has a normal mood and affect. His behavior is normal. Judgment and thought content normal.         Health Maintenance       Date Due Completion Date    TETANUS VACCINE 01/23/1986 ---    Colonoscopy 01/23/2018 ---    Influenza Vaccine 08/01/2018 ---    Lipid Panel 08/30/2023 8/30/2018            ASSESSMENT     48 y.o. male with     1. Acute gout due to renal impairment involving left knee    2. Chronic gout of multiple sites, unspecified cause        PLAN:     1. Acute gout due to renal impairment involving left knee  - Will start a short steroid burst for acute gouty flare  - ketorolac injection 30 mg; Inject 1 mL (30 mg total) into the muscle one time.  - predniSONE (DELTASONE) 50 MG Tab; Take 1 tablet (50 mg total) by mouth once daily. for 3 days  Dispense: 3 tablet; Refill: 0    2. " Chronic gout of multiple sites, unspecified cause  - Pt to start Duzallo after acute flare up resolved; he has failed therapy with Allopurinol alone and could not afford Uloric  - lesinurad-allopurinol 200-300 mg Tab; Take 1 tablet by mouth once daily.  Dispense: 90 tablet; Refill: 1        RTC in 1-2 weeks as needed for any acute worsening of current condition or failure to improve       Radha Mills MD  10/01/2018 9:04 AM        No Follow-up on file.

## 2018-10-01 NOTE — MEDICAL/APP STUDENT
Subjective:       Patient ID: Tommy Lamar Sr. is a 48 y.o. male.    Chief Complaint: Knee Pain    Mr Lamar is a 48yoM with PMH of gout who presents to clinic this morning with L knee pain. The pain is similar in character to previous acute gout episodes. Pain began last Thursday while he was at work. Patient denies any trauma. Pain became increasingly more severe and similar to gout pain from previous episodes. Pain is described as sharp and throbbing in character, 9/10 in severity at rest, increasing to >10/10 with movement. Patient endorses swelling and decreased ROM. Patient stoped taking his allopurinol Thursday evening. Over the weekend he did take two pills of colchicine and Advil. Today he has taken indomethacin. He has had very little relief of his pain. Patient denies consumption of food high in uric acid (no red meat, no crawfish) or alcohol.      Review of Systems   Constitutional: Negative for appetite change, chills and fever.   HENT: Negative for congestion and sore throat.    Respiratory: Negative for cough and chest tightness.    Cardiovascular: Negative for chest pain and palpitations.   Gastrointestinal: Negative for abdominal pain, constipation, diarrhea, nausea and vomiting.   Genitourinary: Negative for decreased urine volume and dysuria.   Musculoskeletal: Positive for arthralgias and joint swelling.   Skin: Negative for color change and rash.   Neurological: Negative for dizziness, light-headedness and headaches.   Psychiatric/Behavioral: Negative for agitation and behavioral problems.       Past Medical History:   Diagnosis Date    Gout     Nephrolithiasis      Past Surgical History:   Procedure Laterality Date    HERNIA REPAIR      KIDNEY SURGERY      TONSILLECTOMY       Review of patient's allergies indicates:   Allergen Reactions    Pcn [penicillins] Rash       Objective:       Vitals:    10/01/18 0842   BP: 134/88   Pulse: 78   Resp: 18   Temp: 98.5 °F (36.9 °C)      Physical Exam   Constitutional: He is oriented to person, place, and time. He appears well-developed and well-nourished. No distress.   HENT:   Head: Normocephalic and atraumatic.   Cardiovascular: Normal rate, regular rhythm and normal heart sounds.   Pulmonary/Chest: Effort normal and breath sounds normal.   Musculoskeletal:        Right knee: Normal.        Left knee: He exhibits decreased range of motion. He exhibits no effusion, no ecchymosis, no laceration and no erythema. Tenderness found. Patellar tendon tenderness noted.   Neurological: He is alert and oriented to person, place, and time.   Skin: Skin is warm and dry. No rash noted. He is not diaphoretic. No erythema.   Psychiatric: He has a normal mood and affect.       Assessment:       1. Acute gout due to renal impairment involving left knee    2. Chronic gout of multiple sites, unspecified cause        Plan:      Acute Gout, Left Knee  - Prednisone 50 mg PO for 3 days  - Ketorolac 30mg injection in office today for pain  - On resolution of acute attack, begin lesinurad-allopurinol 200-300 mg combined tablet once per day.   - Counseled patient regarding future attacks:   -Take 2 colchicine tablets in the morning. Take and additional 1 tablet an hour later.   -Take indomethacin PRN for pain   -Discontinue lesinurad-allopurinol  - Continue diet low in uric acid (avoidance of red meats, seafoods  and alcohol).     -RTC if no improvement.      Antonia Matute, MS4

## 2018-10-08 ENCOUNTER — TELEPHONE (OUTPATIENT)
Dept: FAMILY MEDICINE | Facility: CLINIC | Age: 48
End: 2018-10-08

## 2018-10-08 NOTE — TELEPHONE ENCOUNTER
----- Message from Negro Bell MD sent at 9/29/2018 12:43 PM CDT -----  Schedule 6 month recall, thank you!

## 2018-12-18 ENCOUNTER — HOSPITAL ENCOUNTER (EMERGENCY)
Facility: HOSPITAL | Age: 48
Discharge: HOME | End: 2018-12-18
Attending: EMERGENCY MEDICINE
Payer: COMMERCIAL

## 2018-12-18 VITALS
TEMPERATURE: 98 F | RESPIRATION RATE: 16 BRPM | DIASTOLIC BLOOD PRESSURE: 87 MMHG | SYSTOLIC BLOOD PRESSURE: 134 MMHG | HEART RATE: 100 BPM | OXYGEN SATURATION: 97 %

## 2018-12-18 DIAGNOSIS — I10 HYPERTENSION, UNSPECIFIED TYPE: ICD-10-CM

## 2018-12-18 DIAGNOSIS — I47.10 SVT (SUPRAVENTRICULAR TACHYCARDIA): Primary | ICD-10-CM

## 2018-12-18 DIAGNOSIS — R00.2 PALPITATIONS: ICD-10-CM

## 2018-12-18 LAB
AMPHET+METHAMPHET UR QL: NEGATIVE
ANION GAP SERPL CALC-SCNC: 14 MMOL/L (ref 8–16)
BARBITURATES UR QL SCN>200 NG/ML: NEGATIVE
BASOPHILS # BLD AUTO: 0.03 K/UL
BASOPHILS NFR BLD: 0.3 %
BENZODIAZ UR QL SCN>200 NG/ML: NEGATIVE
BUN SERPL-MCNC: 19 MG/DL (ref 6–30)
BZE UR QL SCN: NEGATIVE
CANNABINOIDS UR QL SCN: NEGATIVE
CHLORIDE SERPL-SCNC: 105 MMOL/L (ref 95–110)
CREAT SERPL-MCNC: 1.3 MG/DL (ref 0.5–1.4)
CREAT UR-MCNC: 18 MG/DL
DIFFERENTIAL METHOD: ABNORMAL
EOSINOPHIL # BLD AUTO: 0.1 K/UL
EOSINOPHIL NFR BLD: 1.1 %
ERYTHROCYTE [DISTWIDTH] IN BLOOD BY AUTOMATED COUNT: 12.5 %
GLUCOSE SERPL-MCNC: 114 MG/DL (ref 70–110)
HCT VFR BLD AUTO: 42.9 %
HCT VFR BLD CALC: 39 %PCV (ref 36–54)
HGB BLD-MCNC: 14.9 G/DL
LYMPHOCYTES # BLD AUTO: 2.8 K/UL
LYMPHOCYTES NFR BLD: 31.5 %
MCH RBC QN AUTO: 30.3 PG
MCHC RBC AUTO-ENTMCNC: 34.7 G/DL
MCV RBC AUTO: 87 FL
METHADONE UR QL SCN>300 NG/ML: NEGATIVE
MONOCYTES # BLD AUTO: 0.7 K/UL
MONOCYTES NFR BLD: 7.6 %
NEUTROPHILS # BLD AUTO: 5.3 K/UL
NEUTROPHILS NFR BLD: 59.5 %
OPIATES UR QL SCN: NEGATIVE
PCP UR QL SCN>25 NG/ML: NEGATIVE
PLATELET # BLD AUTO: 197 K/UL
PMV BLD AUTO: 9.1 FL
POC IONIZED CALCIUM: 1.24 MMOL/L (ref 1.06–1.42)
POC TCO2 (MEASURED): 30 MMOL/L (ref 23–29)
POTASSIUM BLD-SCNC: 3.7 MMOL/L (ref 3.5–5.1)
RBC # BLD AUTO: 4.92 M/UL
SAMPLE: ABNORMAL
SODIUM BLD-SCNC: 144 MMOL/L (ref 136–145)
TOXICOLOGY INFORMATION: ABNORMAL
TSH SERPL DL<=0.005 MIU/L-ACNC: 1.53 UIU/ML
WBC # BLD AUTO: 8.96 K/UL

## 2018-12-18 PROCEDURE — 99285 EMERGENCY DEPT VISIT HI MDM: CPT | Mod: 25

## 2018-12-18 PROCEDURE — 82565 ASSAY OF CREATININE: CPT

## 2018-12-18 PROCEDURE — 99900035 HC TECH TIME PER 15 MIN (STAT)

## 2018-12-18 PROCEDURE — 93010 ELECTROCARDIOGRAM REPORT: CPT | Mod: ,,, | Performed by: INTERNAL MEDICINE

## 2018-12-18 PROCEDURE — 96361 HYDRATE IV INFUSION ADD-ON: CPT

## 2018-12-18 PROCEDURE — 84443 ASSAY THYROID STIM HORMONE: CPT

## 2018-12-18 PROCEDURE — 84295 ASSAY OF SERUM SODIUM: CPT

## 2018-12-18 PROCEDURE — 96374 THER/PROPH/DIAG INJ IV PUSH: CPT

## 2018-12-18 PROCEDURE — 63600175 PHARM REV CODE 636 W HCPCS: Performed by: EMERGENCY MEDICINE

## 2018-12-18 PROCEDURE — 25000003 PHARM REV CODE 250: Performed by: PHYSICIAN ASSISTANT

## 2018-12-18 PROCEDURE — 82330 ASSAY OF CALCIUM: CPT

## 2018-12-18 PROCEDURE — 25000003 PHARM REV CODE 250: Performed by: EMERGENCY MEDICINE

## 2018-12-18 PROCEDURE — 80307 DRUG TEST PRSMV CHEM ANLYZR: CPT

## 2018-12-18 PROCEDURE — 85025 COMPLETE CBC W/AUTO DIFF WBC: CPT

## 2018-12-18 PROCEDURE — 84132 ASSAY OF SERUM POTASSIUM: CPT

## 2018-12-18 PROCEDURE — 85014 HEMATOCRIT: CPT

## 2018-12-18 PROCEDURE — 93005 ELECTROCARDIOGRAM TRACING: CPT

## 2018-12-18 RX ORDER — ADENOSINE 3 MG/ML
12 INJECTION, SOLUTION INTRAVENOUS
Status: DISCONTINUED | OUTPATIENT
Start: 2018-12-18 | End: 2018-12-18

## 2018-12-18 RX ORDER — POTASSIUM CHLORIDE 750 MG/1
50 TABLET, EXTENDED RELEASE ORAL
Status: COMPLETED | OUTPATIENT
Start: 2018-12-18 | End: 2018-12-18

## 2018-12-18 RX ORDER — ASPIRIN 325 MG
325 TABLET ORAL
Status: COMPLETED | OUTPATIENT
Start: 2018-12-18 | End: 2018-12-18

## 2018-12-18 RX ORDER — METOPROLOL TARTRATE 25 MG/1
12.5 TABLET, FILM COATED ORAL 2 TIMES DAILY
Qty: 30 TABLET | Refills: 0 | Status: SHIPPED | OUTPATIENT
Start: 2018-12-18 | End: 2018-12-24

## 2018-12-18 RX ORDER — ADENOSINE 3 MG/ML
6 INJECTION, SOLUTION INTRAVENOUS
Status: COMPLETED | OUTPATIENT
Start: 2018-12-18 | End: 2018-12-18

## 2018-12-18 RX ADMIN — SODIUM CHLORIDE 1000 ML: 0.9 INJECTION, SOLUTION INTRAVENOUS at 01:12

## 2018-12-18 RX ADMIN — ADENOSINE 6 MG: 3 INJECTION, SOLUTION INTRAVENOUS at 01:12

## 2018-12-18 RX ADMIN — POTASSIUM CHLORIDE 50 MEQ: 750 TABLET, EXTENDED RELEASE ORAL at 02:12

## 2018-12-18 RX ADMIN — ASPIRIN 325 MG ORAL TABLET 325 MG: 325 PILL ORAL at 01:12

## 2018-12-18 NOTE — ED PROVIDER NOTES
"Encounter Date: 12/18/2018       History     Chief Complaint   Patient presents with    Palpitations     states while working felt like "my heart started racing".       48 y.o. male works as a , denies etoh/drugs/recent illness, notes that all of the sudden his heart started to race. Denies f/c,n/v, diarrhea/dysuria. Denies cp/sob/recent illness          Review of patient's allergies indicates:   Allergen Reactions    Pcn [penicillins] Rash     Past Medical History:   Diagnosis Date    Gout     Nephrolithiasis      Past Surgical History:   Procedure Laterality Date    HERNIA REPAIR      KIDNEY SURGERY      TONSILLECTOMY       Family History   Family history unknown: Yes     Social History     Tobacco Use    Smoking status: Never Smoker   Substance Use Topics    Alcohol use: No    Drug use: Not on file     Review of Systems   Constitutional: Negative for fever.   HENT: Negative for sore throat.    Respiratory: Negative for shortness of breath.    Cardiovascular: Positive for palpitations. Negative for chest pain.   Gastrointestinal: Negative for nausea.   Genitourinary: Negative for dysuria.   Musculoskeletal: Negative for back pain.   Skin: Negative for rash.   Neurological: Negative for weakness.   Hematological: Does not bruise/bleed easily.   All other systems reviewed and are negative.      Physical Exam     Initial Vitals [12/18/18 1314]   BP Pulse Resp Temp SpO2   (!) 158/84 (!) 219 -- -- --      MAP       --         Physical Exam    Nursing note and vitals reviewed.  Constitutional: He appears well-developed and well-nourished.   HENT:   Head: Normocephalic and atraumatic.   Eyes: EOM are normal. Pupils are equal, round, and reactive to light.   Pulmonary/Chest: Effort normal.   Abdominal: He exhibits no distension.   Musculoskeletal: He exhibits no edema or tenderness.   Neurological: He is alert and oriented to person, place, and time. No cranial nerve deficit.   Skin: Skin is warm and dry. "   Psychiatric: He has a normal mood and affect.     tachy no m HR>200    ED Course   Cardioversion  Date/Time: 12/18/2018 1:28 PM  Location procedure was performed: [facility] EMERGENCY DEPARTMENT  Performed by: Jeffrey Salinas MD  Authorized by: Jeffrey Salinas MD   Pre-operative diagnosis: svt  Post-operative diagnosis: svt  Consent Done: Emergent Situation  Patient sedated: no  Cardioversion basis: emergent  Pre-procedure rhythm: supraventricular tachycardia  Patient position: patient was placed in a supine position  Chest area: chest area exposed  Electrodes: pads  Number of attempts: 1  Attempt 1 outcome: conversion to normal sinus rhythm  Post-procedure rhythm: sinus tachycardia  Complications: no complications  Patient tolerance: Patient tolerated the procedure well with no immediate complications  Technical procedures used: Adenosine 6mg IVP  Comments: Chemical cardioversion        Labs Reviewed   DRUG SCREEN PANEL, URINE EMERGENCY   CBC W/ AUTO DIFFERENTIAL   ISTAT CHEM8     EKG Readings: (Independently Interpreted)   Hr 114 sinus tach, Lant fascicular block, no elva/twi. Non acute       Imaging Results    None          Medical Decision Making:   Initial Assessment:   Pt in SVT.   Differential Diagnosis:   Will write for adenosine              Attending Attestation:         Attending Critical Care:   Critical Care Times:   Direct Patient Care (initial evaluation, reassessments, and time considering the case)................................................................10 minutes.   Additional History from reviewing old medical records or taking additional history from the family, EMS, PCP, etc.......................5 minutes.   Ordering, Reviewing, and Interpreting Diagnostic Studies...............................................................................................................10 minutes.    Documentation..................................................................................................................................................................................10 minutes.   ==============================================================  · Total Critical Care Time - exclusive of procedural time: 35 minutes.  ==============================================================                 Clinical Impression:   The primary encounter diagnosis was SVT (supraventricular tachycardia). Diagnoses of Palpitations and Hypertension, unspecified type were also pertinent to this visit.                             Jeffrey Salinas MD  12/18/18 3914       Jeffrey Salinas MD  01/07/19 [id number]

## 2018-12-18 NOTE — ED TRIAGE NOTES
Pt arrived to ED via personal transport with c/o palpitations x 1 hr. Pt denies CP, lightheadedness, SOB. He is aao x 4 and in no apparent distress.

## 2018-12-18 NOTE — ED NOTES
Dr. Salinas at bedside. Pt on life elva. 2 18g IVs. Myself and Sherron Michelle at bedside. Administered 6mg of Adenosine.

## 2018-12-18 NOTE — PROGRESS NOTES
Results for MARIA MOSLEY SR. (MRN [id number]) as of 12/18/2018 14:00   Ref. Range 12/18/2018 13:51   POC Glucose Latest Ref Range: 70 - 110 mg/dL 114 (H)   POC Creatinine Latest Ref Range: 0.5 - 1.4 mg/dL 1.3   POC BUN Latest Ref Range: 6 - 30 mg/dL 19   POC Chloride Latest Ref Range: 95 - 110 mmol/L 105   POC Sodium Latest Ref Range: 136 - 145 mmol/L 144   POC Potassium Latest Ref Range: 3.5 - 5.1 mmol/L 3.7   POC Ionized Calcium Latest Ref Range: 1.06 - 1.42 mmol/L 1.24   POC Hematocrit Latest Ref Range: 36 - 54 %PCV 39   POC TCO2 (MEASURED) Latest Ref Range: 23 - 29 mmol/L 30 (H)   Sample Unknown VENOUS   POC Anion Gap Latest Ref Range: 8 - 16 mmol/L 14

## 2018-12-18 NOTE — ED NOTES
Bed: 03main  Expected date:   Expected time:   Means of arrival:   Comments:  CODE TERENCE Cates RN  12/18/18 5586

## 2018-12-21 ENCOUNTER — TELEPHONE (OUTPATIENT)
Dept: FAMILY MEDICINE | Facility: CLINIC | Age: 48
End: 2018-12-21

## 2018-12-21 NOTE — TELEPHONE ENCOUNTER
----- Message from Susana Hardin sent at 12/21/2018  2:37 PM CST -----  Contact: Self  Pt is calling to speak with staff regarding some questions about his ED visit. Please call pt at [phone]

## 2018-12-21 NOTE — TELEPHONE ENCOUNTER
Patient came in . He was seen in the ER on 12/18/18 was put on Metoprolol 25mg. Patient states that is it making him very sleepy and sluggish. He checked his pulse while on the medication an stated that it was in the mid 50's  And was concerned. He has decreased amount to 1/2 pill a day and I informed to keep up with vitals. Patient was not in distress at all just more so concerned. Scheduled patient to be seen on 12/24/18 with provider but in the meantime he will stick to 1/2 tablet a day until he follow up     Any suggestions in the meantime? Should patient d/c meds all together?  Please Advise

## 2018-12-24 ENCOUNTER — OFFICE VISIT (OUTPATIENT)
Dept: FAMILY MEDICINE | Facility: CLINIC | Age: 48
End: 2018-12-24
Payer: COMMERCIAL

## 2018-12-24 VITALS
OXYGEN SATURATION: 98 % | TEMPERATURE: 98 F | SYSTOLIC BLOOD PRESSURE: 140 MMHG | WEIGHT: 210.13 LBS | DIASTOLIC BLOOD PRESSURE: 92 MMHG | BODY MASS INDEX: 30.15 KG/M2 | HEART RATE: 60 BPM

## 2018-12-24 DIAGNOSIS — R03.0 ELEVATED BLOOD PRESSURE READING: ICD-10-CM

## 2018-12-24 DIAGNOSIS — I47.10 PAROXYSMAL SVT (SUPRAVENTRICULAR TACHYCARDIA): Primary | ICD-10-CM

## 2018-12-24 PROCEDURE — 99999 PR PBB SHADOW E&M-EST. PATIENT-LVL III: CPT | Mod: PBBFAC,,, | Performed by: INTERNAL MEDICINE

## 2018-12-24 PROCEDURE — 99213 OFFICE O/P EST LOW 20 MIN: CPT | Mod: S$GLB,,, | Performed by: INTERNAL MEDICINE

## 2018-12-24 PROCEDURE — 3008F BODY MASS INDEX DOCD: CPT | Mod: CPTII,S$GLB,, | Performed by: INTERNAL MEDICINE

## 2018-12-24 RX ORDER — PROMETHAZINE HYDROCHLORIDE AND CODEINE PHOSPHATE 6.25; 1 MG/5ML; MG/5ML
SOLUTION ORAL
Refills: 1 | COMMUNITY
Start: 2018-12-20 | End: 2018-12-24

## 2018-12-24 RX ORDER — AZITHROMYCIN 250 MG/1
TABLET, FILM COATED ORAL
Refills: 0 | COMMUNITY
Start: 2018-12-03 | End: 2018-12-24

## 2018-12-24 RX ORDER — METOPROLOL TARTRATE 25 MG/1
12.5 TABLET, FILM COATED ORAL NIGHTLY
Qty: 30 TABLET | Refills: 0
Start: 2018-12-24 | End: 2019-01-09 | Stop reason: SDUPTHER

## 2018-12-24 NOTE — PROGRESS NOTES
SUBJECTIVE     Chief Complaint   Patient presents with    Hypertension    Follow-up    Medication Problem     Cut metoprolol 1/2 tablet at night. Was not feeling good on the full 25mg       HPI  Tommy Lamar Sr. is a 48 y.o. male with multiple medical diagnoses as listed in the medical history and problem list that presents for follow-up after recent ED visit on 12/18/18 after experiencing palpitations at work. He went to the ED because he started getting nervous and had a HR of 230s. Pt was diagnosed with SVT and received chemical cardioversion with Adenosine. He was started on Metroprolol 12.5 mg BID. He then started to feel lethargic with bradycardia down to the 50s, so he has been taking only 12.5 mg qhs. Pt denies any complications/adverse reactions with taking 1 tab daily. He has no further palpitations.    PAST MEDICAL HISTORY:  Past Medical History:   Diagnosis Date    Gout     Nephrolithiasis        PAST SURGICAL HISTORY:  Past Surgical History:   Procedure Laterality Date    HERNIA REPAIR      KIDNEY SURGERY      TONSILLECTOMY         SOCIAL HISTORY:  Social History     Socioeconomic History    Marital status:      Spouse name: Not on file    Number of children: Not on file    Years of education: Not on file    Highest education level: Not on file   Social Needs    Financial resource strain: Not on file    Food insecurity - worry: Not on file    Food insecurity - inability: Not on file    Transportation needs - medical: Not on file    Transportation needs - non-medical: Not on file   Occupational History    Not on file   Tobacco Use    Smoking status: Never Smoker    Smokeless tobacco: Never Used   Substance and Sexual Activity    Alcohol use: No    Drug use: No    Sexual activity: Yes     Partners: Female     Birth control/protection: None   Other Topics Concern    Not on file   Social History Narrative    Not on file       FAMILY HISTORY:  Family History   Family  history unknown: Yes       ALLERGIES AND MEDICATIONS: updated and reviewed.  Review of patient's allergies indicates:   Allergen Reactions    Pcn [penicillins] Rash     Current Outpatient Medications   Medication Sig Dispense Refill    fluticasone (FLONASE) 50 mcg/actuation nasal spray       FLUZONE QUAD 9705-6890, PF, 60 mcg (15 mcg x 4)/0.5 mL Syrg TO BE ADMINISTERED BY PHARMACIST FOR IMMUNIZATION  0    metoprolol tartrate (LOPRESSOR) 25 MG tablet Take 0.5 tablets (12.5 mg total) by mouth every evening. 30 tablet 0    omeprazole (PRILOSEC) 40 MG capsule Take 1 capsule (40 mg total) by mouth once daily. 90 capsule 3     No current facility-administered medications for this visit.        ROS  Review of Systems   Constitutional: Negative for chills and fever.   HENT: Negative for hearing loss and sore throat.    Eyes: Negative for visual disturbance.   Respiratory: Negative for cough and shortness of breath.    Cardiovascular: Negative for chest pain, palpitations and leg swelling.   Gastrointestinal: Negative for abdominal pain, constipation, diarrhea, nausea and vomiting.   Genitourinary: Negative for dysuria, frequency and urgency.   Musculoskeletal: Negative for arthralgias, joint swelling and myalgias.   Skin: Negative for rash and wound.   Neurological: Positive for headaches.   Psychiatric/Behavioral: Negative for agitation and confusion. The patient is not nervous/anxious.          OBJECTIVE     Physical Exam  Vitals:    12/24/18 1038   BP: (!) 140/92   Pulse: 60   Temp: 98.3 °F (36.8 °C)    Body mass index is 30.15 kg/m².  Weight: 95.3 kg (210 lb 1.6 oz)         Physical Exam   Constitutional: He is oriented to person, place, and time. He appears well-developed and well-nourished. No distress.   HENT:   Head: Normocephalic and atraumatic.   Right Ear: External ear normal.   Left Ear: External ear normal.   Nose: Nose normal.   Mouth/Throat: Oropharynx is clear and moist.   Eyes: Conjunctivae and EOM are  normal. Right eye exhibits no discharge. Left eye exhibits no discharge. No scleral icterus.   Neck: Normal range of motion. Neck supple. No JVD present. No tracheal deviation present.   Cardiovascular: Normal rate, regular rhythm, normal heart sounds and intact distal pulses. Exam reveals no gallop and no friction rub.   No murmur heard.  Pulmonary/Chest: Effort normal and breath sounds normal. No respiratory distress. He has no wheezes.   Abdominal: Soft. Bowel sounds are normal. He exhibits no distension and no mass. There is no tenderness. There is no rebound and no guarding.   Musculoskeletal: Normal range of motion. He exhibits no edema, tenderness or deformity.   Neurological: He is alert and oriented to person, place, and time. He exhibits normal muscle tone. Coordination normal.   Skin: Skin is warm and dry. No rash noted. No erythema.   Psychiatric: He has a normal mood and affect. His behavior is normal. Judgment and thought content normal.         Health Maintenance       Date Due Completion Date    TETANUS VACCINE 01/23/1986 ---    Colonoscopy 01/23/2018 ---    Influenza Vaccine 08/01/2018 ---    Lipid Panel 08/30/2023 8/30/2018            ASSESSMENT     48 y.o. male with     1. Paroxysmal SVT (supraventricular tachycardia)    2. Elevated blood pressure reading        PLAN:     1. Paroxysmal SVT (supraventricular tachycardia)  - Resolved  - The current medical regimen is effective;  continue present plan and medications.  - Reviewed lab from recent ED visit  - Ambulatory referral to Cardiology  - metoprolol tartrate (LOPRESSOR) 25 MG tablet; Take 0.5 tablets (12.5 mg total) by mouth every evening.  Dispense: 30 tablet; Refill: 0    2. Elevated blood pressure reading  - BP elevated above goal of <140/90; possibly 2/2 anxiety about health  - Monitor        RTC in 4 weeks for repeat assessment of current treatment plan       Radha Mills MD  12/24/2018 10:47 AM        No Follow-up on file.

## 2018-12-24 NOTE — PATIENT INSTRUCTIONS
Understanding Supraventricular Tachycardia  Supraventricular tachycardia (SVT) is a type of abnormal heart rhythm that results in fast heartbeats. The heart normally beats 60 to 100 beats per minute while you are at rest and awake. With SVT, the heart beats more than 100 times a minute. It may even beat over 200 times a minute. This is caused by a problem in the electrical system of the heart. It can lessen the amount of blood pumped through the heart.  How the heart beats  A heartbeat is the rhythm of the heart as it contracts to squeeze blood through the body. Its caused by electrical signals in the heart. A beat starts when a special group of cells give off an electrical signal. These cells are in the sinoatrial (SA) node. The SA node is in the upper right chamber of your heart (atrium). The signal from the SA node travels down to the 2 lower chambers of your heart (ventricles). On the way, the signal goes through the atrioventricular (AV) node. This is a special group of cells between the atria and ventricles. From there, the signal travels to your left and right ventricles. As it travels, the signal tells nearby parts of your heart to contract. This causes your heart to pump in a coordinated way.  What is SVT?  When you have SVT, the signal to start your heartbeat doesnt come from the SA node. Instead it comes from another part of the left or right atrium. Or it comes from the AV node. Some area outside the SA node begins to fire quickly, causing a rapid heartbeat of over 100 beats per minute or the electrical signals are caught up in an abnormal looping circuit. This shortens the time your ventricles have to fill. If your heartbeat is fast enough, your heart may be unable to pump enough blood forward to the rest of your body. The abnormal heartbeat may last for a few seconds to a few hours before your heart returns to its normal rhythm. Some SVT rhythms can last for days or weeks, or even become  permanent.  Types of SVT  There are several types of SVT. They include:  · Atrial fibrillation. This is most common type of SVT. The upper chambers of the heart quiver very fast instead of pumping due to disorganized electrical activity in the atria.  · Atrial flutter. This type of SVT is a milder form of fibrillation. The upper chambers of the heart flutter instead of pumping normally.  · Atrioventricular norm reentrant tachycardia. It occurs when you have two channels through the AV node, instead of just one. The signal goes down one channel and up the other.  · Atrioventricular reciprocating tachycardia. With this condition, there is an extra connection of muscle between the atrium and the ventricle. This is known as an accessory pathway and can conduct electricity upwards and downwards. The signal goes down the AV node and back to the atrium through the accessory pathway. It then goes down the AV node again. In rare cases, this condition leads to an abnormal heart rhythm that causes sudden death. This is a congenital defect, which means you were born with it.  · Atrial tachycardia. This is another common type of SVT. A small group of cells in the atria begin to fire abnormally and trigger the fast heartbeat.  · Multifocal atrial tachycardia. Multiple groups of cells in your atria fire abnormally and trigger a fast heartbeat.  What causes SVT?  Some types of SVT run in families. Some people have heart problems from birth that cause SVT. High blood pressure, heart failure, mitral valve disease, sleep apnea, thyroid problems, and heart attacks can cause SVT. Smoking, excess caffeine or alcohol, and some medicines can increase your risk of having SVT.  Symptoms of SVT  When SVT happens, you may feel no symptoms. Or you may have:  · Fluttering feelings in your chest (palpitations)  · A tight feeling or pain in your chest  · A pulsing feeling in your neck  · Dizziness  · Shortness of  breath  · Tiredness  · Fainting  · Nausea  In very rare cases, SVT can cause sudden death.  Diagnosing SVT  Your primary healthcare provider may diagnose you. Or you may see a heart doctor (cardiologist). The doctor will ask about your health history. He or she will also give you a physical exam. You may also have tests. These help show what kind of SVT you have, and what may cause it. They also help check for other problems. The tests may include:  · Electrocardiogram (ECG), to analyze the abnormal rhythm  · Continuous heart monitors such as a holter monitor or an event recorder to watch your heart rhythm over a longer period in an attempt to catch the SVT rhythm  · Blood tests, to look for various causes such as thyroid problems or electrolyte abnormalities  · Chest X-ray, to check for lung problems and look at the size of your heart  · Exercise stress test, to see how well your heart works under stress  · Echocardiography, to check your heart structure and function  · Electrophysiologic study (EPS), an invasive procedure using wires in the heart to check the heart's electrical signals and diagnose the SVT  Date Last Reviewed: 5/1/2016  © 5370-1298 [employer] [address] [place] [address]. All rights reserved. This information is not intended as a substitute for professional medical care. Always follow your healthcare professional's instructions.        Treatment for Supraventricular Tachycardia  Supraventricular tachycardia (SVT) is a type of abnormally fast heartbeat. The heart normally beats 60 to 100 beats per minute. With SVT, the heart beats more than 100 times a minute. It may beat as fast as 250 times a minute. This is caused by a problem in the electrical system of the heart. It can lessen the amount of blood pumped through the heart.  Types of treatment   You may not need treatment for SVT if you have only had one episode. If you do need treatment, there are several kinds.  They include:  · Valsalva maneuver. This is a way to increase pressure in the abdomen and chest. It can correct your heart rhythm right away. To do it, you bear down with your stomach muscles, as though you are trying to have a bowel movement.  · Carotid massage. Your healthcare provider may gently rub the carotid artery in your neck. This can also help correct the heart rhythm right away.  · Medicine. There are various kinds you can take. Calcium channel blockers or adenosine can help correct heart rhythm right away. If you have SVT only 1 or 2 times a year, you may take beta-blockers or calcium channel medicine as needed. If your SVT is more frequent, you may need to take medicine every day. Some people may need to take several medicines to prevent episodes of SVT.  · Electrocardioversion. This is a shock to the heart to restart a normal rhythm right away. This may be done if you have a severe episode of SVT.  · Catheter ablation. This can help permanently stop SVT. Your healthcare provider puts a thin, flexible tube (catheter) into a blood vessel in the groin. He or she then gently pushes it up into your heart. The area of your heart that causes your SVT is then burned. This prevents that area from starting a signal that causes SVT.   Lifestyle changes to help prevent SVT episodes  Your healthcare provider might suggest other ways to help prevent SVT, such as:  · Having less alcohol and caffeine  · Not smoking  · Lowering your stress  · Eating foods that are healthy for your heart  When to call your healthcare provider  Call your healthcare provider if you have any of the following:  · Sudden shortness of breath (call 911)  · Severe palpitations  · Severe dizziness  · Severe chest pain  · Symptoms that are happening more often   Date Last Reviewed: 8/10/2015  © 4277-1837 [employer]. [address] [place] [address], [place] [address]. All rights reserved. This information is not intended as a substitute for  professional medical care. Always follow your healthcare professional's instructions.

## 2019-01-09 ENCOUNTER — OFFICE VISIT (OUTPATIENT)
Dept: CARDIOLOGY | Facility: CLINIC | Age: 49
End: 2019-01-09
Payer: COMMERCIAL

## 2019-01-09 VITALS
HEIGHT: 70 IN | SYSTOLIC BLOOD PRESSURE: 132 MMHG | OXYGEN SATURATION: 97 % | WEIGHT: 211 LBS | HEART RATE: 74 BPM | DIASTOLIC BLOOD PRESSURE: 66 MMHG | RESPIRATION RATE: 15 BRPM | BODY MASS INDEX: 30.21 KG/M2

## 2019-01-09 DIAGNOSIS — I47.10 PSVT (PAROXYSMAL SUPRAVENTRICULAR TACHYCARDIA): Primary | ICD-10-CM

## 2019-01-09 DIAGNOSIS — I47.10 PAROXYSMAL SVT (SUPRAVENTRICULAR TACHYCARDIA): ICD-10-CM

## 2019-01-09 PROCEDURE — 99243 PR OFFICE CONSULTATION,LEVEL III: ICD-10-PCS | Mod: S$GLB,,, | Performed by: INTERNAL MEDICINE

## 2019-01-09 PROCEDURE — 99999 PR PBB SHADOW E&M-EST. PATIENT-LVL III: CPT | Mod: PBBFAC,,, | Performed by: INTERNAL MEDICINE

## 2019-01-09 PROCEDURE — 99999 PR PBB SHADOW E&M-EST. PATIENT-LVL III: ICD-10-PCS | Mod: PBBFAC,,, | Performed by: INTERNAL MEDICINE

## 2019-01-09 PROCEDURE — 99243 OFF/OP CNSLTJ NEW/EST LOW 30: CPT | Mod: S$GLB,,, | Performed by: INTERNAL MEDICINE

## 2019-01-09 RX ORDER — METOPROLOL TARTRATE 25 MG/1
12.5 TABLET, FILM COATED ORAL NIGHTLY
Qty: 45 TABLET | Refills: 3 | Status: SHIPPED | OUTPATIENT
Start: 2019-01-09 | End: 2019-02-18 | Stop reason: SDUPTHER

## 2019-01-09 NOTE — PROGRESS NOTES
CARDIOVASCULAR CONSULTATION    REASON FOR CONSULT:   Tommy Lamar Sr. is a 48 y.o. male who presents for PSVT.    Req: Gene  PCP: Ray  HISTORY OF PRESENT ILLNESS:   The patient is referred at the request of his primary care team for management of paroxysmal supraventricular tachycardia.  He was seen in the emergency room on December 18th with palpitations and was noted to be in an SVT which converted with adenosine.  He denies any chest discomfort or shortness of breath.  He has had no recurrent palpitations.  He denies any lightheadedness, dizziness, or syncope.  There has been no PND, orthopnea, or lower extremity edema.  He denies melena, hematuria, or claudicant symptoms.  The patient initially was intolerant of metoprolol, but appears to be doing okay on 12.5 mg once daily of short-acting Lopressor.    Family history appears to be notable for father with coronary disease in his 70s.    The patient is a nonsmoker.  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    CARDIOVASCULAR HISTORY:   PSVT (Scanned CARDIAC MONITOR STRIPS 12/18/18)    PAST MEDICAL HISTORY:     Past Medical History:   Diagnosis Date    Gout     Nephrolithiasis        PAST SURGICAL HISTORY:     Past Surgical History:   Procedure Laterality Date    HERNIA REPAIR      KIDNEY SURGERY      TONSILLECTOMY         ALLERGIES AND MEDICATION:     Review of patient's allergies indicates:   Allergen Reactions    Pcn [penicillins] Rash        Medication List           Accurate as of 1/9/19  3:35 PM. If you have any questions, ask your nurse or doctor.               CONTINUE taking these medications    fluticasone 50 mcg/actuation nasal spray  Commonly known as:  FLONASE     FLUZONE QUAD 1813-6496 (PF) 60 mcg (15 mcg x 4)/0.5 mL Syrg  Generic drug:  flu vac [id number] 36mos up(PF)     metoprolol tartrate 25 MG tablet  Commonly known as:  LOPRESSOR  Take 0.5 tablets (12.5 mg total) by mouth every evening.     omeprazole 40 MG capsule  Commonly known as:   PriLOSEC  Take 1 capsule (40 mg total) by mouth once daily.            SOCIAL HISTORY:     Social History     Socioeconomic History    Marital status:      Spouse name: Not on file    Number of children: Not on file    Years of education: Not on file    Highest education level: Not on file   Social Needs    Financial resource strain: Not on file    Food insecurity - worry: Not on file    Food insecurity - inability: Not on file    Transportation needs - medical: Not on file    Transportation needs - non-medical: Not on file   Occupational History    Not on file   Tobacco Use    Smoking status: Never Smoker    Smokeless tobacco: Never Used   Substance and Sexual Activity    Alcohol use: No    Drug use: No    Sexual activity: Yes     Partners: Female     Birth control/protection: None   Other Topics Concern    Not on file   Social History Narrative    Not on file       FAMILY HISTORY:     Family History   Family history unknown: Yes       REVIEW OF SYSTEMS:   Review of Systems   Constitutional: Negative for chills, diaphoresis and fever.   HENT: Negative for nosebleeds.    Eyes: Negative for blurred vision, double vision and photophobia.   Respiratory: Negative for hemoptysis, shortness of breath and wheezing.    Cardiovascular: Negative for chest pain, palpitations, orthopnea, claudication, leg swelling and PND.   Gastrointestinal: Negative for abdominal pain, blood in stool, heartburn, melena, nausea and vomiting.   Genitourinary: Negative for flank pain and hematuria.   Musculoskeletal: Negative for falls, myalgias and neck pain.   Skin: Negative for rash.   Neurological: Negative for dizziness, seizures, loss of consciousness, weakness and headaches.   Endo/Heme/Allergies: Negative for polydipsia. Does not bruise/bleed easily.   Psychiatric/Behavioral: Negative for depression and memory loss. The patient is not nervous/anxious.        PHYSICAL EXAM:     Vitals:    01/09/19 1520   BP: 132/66  "  Pulse: 74   Resp: 15    Body mass index is 30.28 kg/m².  Weight: 95.7 kg (211 lb)   Height: 5' 10" (177.8 cm)     Physical Exam   Constitutional: He is oriented to person, place, and time. He appears well-developed and well-nourished. He is cooperative.  Non-toxic appearance. No distress.   HENT:   Head: Normocephalic and atraumatic.   Eyes: Conjunctivae and EOM are normal. Pupils are equal, round, and reactive to light. No scleral icterus.   Neck: Trachea normal and normal range of motion. Neck supple. Normal carotid pulses and no JVD present. Carotid bruit is not present. No neck rigidity. No tracheal deviation and no edema present. No thyromegaly present.   Cardiovascular: Normal rate, regular rhythm, S1 normal and S2 normal. PMI is not displaced. Exam reveals no gallop and no friction rub.   No murmur heard.  Pulses:       Carotid pulses are 2+ on the right side, and 2+ on the left side.  Pulmonary/Chest: Effort normal and breath sounds normal. No stridor. No respiratory distress. He has no wheezes. He has no rales. He exhibits no tenderness.   Abdominal: Soft. He exhibits no distension. There is no hepatosplenomegaly.   Musculoskeletal: Normal range of motion. He exhibits no edema or tenderness.   Feet:   Right Foot:   Skin Integrity: Negative for ulcer.   Left Foot:   Skin Integrity: Negative for ulcer.   Neurological: He is alert and oriented to person, place, and time. No cranial nerve deficit.   Skin: Skin is warm and dry. No rash noted. No erythema.   Psychiatric: He has a normal mood and affect. His speech is normal and behavior is normal.   Vitals reviewed.      DATA:   EKG: (personally reviewed tracing)  12/18/18 tele: SVT 200s  12/18/18 , LAD    Laboratory:  CBC:  Recent Labs   Lab 08/30/18  0806 12/18/18  1325 12/18/18  1351   WHITE BLOOD CELL COUNT 5.16 8.96  --    HEMOGLOBIN 13.9 L 14.9  --    POC HEMATOCRIT  --   --  39   HEMATOCRIT 40.0 42.9  --    PLATELETS 192 197  --  "       CHEMISTRIES:  Recent Labs   Lab 08/30/18  0806   GLUCOSE 83   SODIUM 139   POTASSIUM 4.1   BUN BLD 15   CREATININE 1.2   EGFR IF  >60   EGFR IF NON- >60   CALCIUM 9.1       CARDIAC BIOMARKERS:        COAGS:        LIPIDS/LFTS:  Recent Labs   Lab 08/30/18  0806   CHOLESTEROL 141   TRIGLYCERIDES 117   HDL 40   LDL CHOLESTEROL 77.6   NON-HDL CHOLESTEROL 101   AST 16   ALT 20     Lab Results   Component Value Date    TSH 1.528 12/18/2018     POC labs 12/18/2018  Creatinine 1.3  Potassium 3.7  Hematocrit 39  Urine drug screen negative      Cardiovascular Testing:  Ordered  Echo    ASSESSMENT:   # PSVT, no recurrence  # BMI 30    PLAN:   Cont metoprolol 12.5mg qd  Check echo  RTC 3 months or sooner if recurrent sxs in which case I will refer to Dr. Knowles ([facility]) for ablation    Himanshu Araya MD, FACC

## 2019-01-09 NOTE — LETTER
January 9, 2019      Radha Mills MD  [address] [address]  [address]  [place] [address]           [facility]  [address] [facility] [address]  [place] [address]  Phone: [phone]          Patient: Tommy Lamar Sr.   MR Number: [id number]   YOB: 1970   Date of Visit: 1/9/2019       Dear Dr. Radha Mills:    Thank you for referring Tommy Lamar to me for evaluation. Attached you will find relevant portions of my assessment and plan of care.    If you have questions, please do not hesitate to call me. I look forward to following Tommy Lamar along with you.    Sincerely,    Himanshu Araya MD    Enclosure  CC:  Negro Bell MD    If you would like to receive this communication electronically, please contact [email or web address] or [phone] to request more information on [employer] access.    For providers and/or their staff who would like to refer a patient to [facility], please contact us through our one-stop-shop provider referral line, [facility], at [phone].    If you feel you have received this communication in error or would no longer like to receive these types of communications, please e-mail [email or web address]

## 2019-01-17 ENCOUNTER — HOSPITAL ENCOUNTER (OUTPATIENT)
Dept: CARDIOLOGY | Facility: HOSPITAL | Age: 49
Discharge: HOME | End: 2019-01-17
Attending: INTERNAL MEDICINE
Payer: COMMERCIAL

## 2019-01-17 VITALS
HEART RATE: 77 BPM | BODY MASS INDEX: 30.21 KG/M2 | WEIGHT: 211 LBS | SYSTOLIC BLOOD PRESSURE: 132 MMHG | DIASTOLIC BLOOD PRESSURE: 66 MMHG | HEIGHT: 70 IN

## 2019-01-17 DIAGNOSIS — I47.10 PSVT (PAROXYSMAL SUPRAVENTRICULAR TACHYCARDIA): ICD-10-CM

## 2019-01-17 LAB
AORTIC ROOT ANNULUS: 2.98 CM
AORTIC VALVE CUSP SEPERATION: 2.12 CM
ASCENDING AORTA: 3.29 CM
AV INDEX (PROSTH): 0.75
AV MEAN GRADIENT: 4.56 MMHG
AV PEAK GRADIENT: 7.73 MMHG
AV VALVE AREA: 3.78 CM2
BSA FOR ECHO PROCEDURE: 2.17 M2
CV ECHO LV RWT: 0.42 CM
DOP CALC AO PEAK VEL: 1.39 M/S
DOP CALC AO VTI: 28.88 CM
DOP CALC LVOT AREA: 5.06 CM2
DOP CALC LVOT DIAMETER: 2.54 CM
DOP CALC LVOT STROKE VOLUME: 109.19 CM3
DOP CALCLVOT PEAK VEL VTI: 21.56 CM
E WAVE DECELERATION TIME: 191.75 MSEC
E/A RATIO: 0.9
ECHO LV POSTERIOR WALL: 1.03 CM (ref 0.6–1.1)
FRACTIONAL SHORTENING: 41 % (ref 28–44)
INTERVENTRICULAR SEPTUM: 1.05 CM (ref 0.6–1.1)
IVRT: 0.04 MSEC
LA MAJOR: 4.14 CM
LA MINOR: 5.93 CM
LA WIDTH: 4.75 CM
LEFT ATRIUM SIZE: 3.46 CM
LEFT ATRIUM VOLUME INDEX: 31.9 ML/M2
LEFT ATRIUM VOLUME: 68.12 CM3
LEFT INTERNAL DIMENSION IN SYSTOLE: 2.91 CM (ref 2.1–4)
LEFT VENTRICLE DIASTOLIC VOLUME INDEX: 53.33 ML/M2
LEFT VENTRICLE DIASTOLIC VOLUME: 113.88 ML
LEFT VENTRICLE MASS INDEX: 87.5 G/M2
LEFT VENTRICLE SYSTOLIC VOLUME INDEX: 15.2 ML/M2
LEFT VENTRICLE SYSTOLIC VOLUME: 32.37 ML
LEFT VENTRICULAR INTERNAL DIMENSION IN DIASTOLE: 4.92 CM (ref 3.5–6)
LEFT VENTRICULAR MASS: 186.89 G
MV PEAK A VEL: 1 M/S
MV PEAK E VEL: 0.9 M/S
PISA TR MAX VEL: 2.95 M/S
PULM VEIN S/D RATIO: 1.12
PV PEAK D VEL: 0.49 M/S
PV PEAK S VEL: 0.55 M/S
PV PEAK VELOCITY: 1.48 CM/S
RA MAJOR: 4.88 CM
RA PRESSURE: 3 MMHG
RA WIDTH: 4.09 CM
RIGHT VENTRICULAR END-DIASTOLIC DIMENSION: 3.36 CM
SINUS: 3.04 CM
STJ: 3.21 CM
TR MAX PG: 34.81 MMHG
TRICUSPID ANNULAR PLANE SYSTOLIC EXCURSION: 2.88 CM
TV REST PULMONARY ARTERY PRESSURE: 38 MMHG

## 2019-01-17 PROCEDURE — 93306 TTE W/DOPPLER COMPLETE: CPT | Mod: 26,,, | Performed by: INTERNAL MEDICINE

## 2019-01-17 PROCEDURE — 93306 TTE W/DOPPLER COMPLETE: CPT

## 2019-01-17 PROCEDURE — 93306 TRANSTHORACIC ECHO (TTE) COMPLETE (CUPID ONLY): ICD-10-PCS | Mod: 26,,, | Performed by: INTERNAL MEDICINE

## 2019-02-18 DIAGNOSIS — I47.10 PAROXYSMAL SVT (SUPRAVENTRICULAR TACHYCARDIA): ICD-10-CM

## 2019-02-18 RX ORDER — METOPROLOL TARTRATE 25 MG/1
12.5 TABLET, FILM COATED ORAL NIGHTLY
Qty: 45 TABLET | Refills: 3 | Status: SHIPPED | OUTPATIENT
Start: 2019-02-18 | End: 2019-04-24

## 2019-02-22 ENCOUNTER — TELEPHONE (OUTPATIENT)
Dept: FAMILY MEDICINE | Facility: CLINIC | Age: 49
End: 2019-02-22

## 2019-02-22 DIAGNOSIS — E79.0 HYPERURICEMIA: ICD-10-CM

## 2019-02-22 DIAGNOSIS — M10.362 ACUTE GOUT DUE TO RENAL IMPAIRMENT INVOLVING LEFT KNEE: Primary | ICD-10-CM

## 2019-02-22 DIAGNOSIS — M10.071 ACUTE IDIOPATHIC GOUT OF RIGHT FOOT: ICD-10-CM

## 2019-02-22 NOTE — TELEPHONE ENCOUNTER
----- Message from Maya Khalil sent at 2/22/2019  9:33 AM CST -----  Contact: Self/  [phone]  Patient would like a refill on his medication for gout.  I don't see it on his medication list.        [facility] - [place], [place] - [address]    Thank you.

## 2019-02-23 RX ORDER — COLCHICINE 0.6 MG/1
TABLET ORAL
Qty: 30 TABLET | Refills: 1 | Status: SHIPPED | OUTPATIENT
Start: 2019-02-23 | End: 2019-04-18 | Stop reason: SDUPTHER

## 2019-02-23 RX ORDER — ALLOPURINOL 300 MG/1
300 TABLET ORAL DAILY
Qty: 90 TABLET | Refills: 3 | Status: SHIPPED | OUTPATIENT
Start: 2019-02-23 | End: 2019-11-08 | Stop reason: SDUPTHER

## 2019-04-17 RX ORDER — OMEPRAZOLE 40 MG/1
CAPSULE, DELAYED RELEASE ORAL
Qty: 90 CAPSULE | Refills: 3 | Status: SHIPPED | OUTPATIENT
Start: 2019-04-17 | End: 2020-05-18

## 2019-04-18 ENCOUNTER — OFFICE VISIT (OUTPATIENT)
Dept: FAMILY MEDICINE | Facility: CLINIC | Age: 49
End: 2019-04-18
Payer: COMMERCIAL

## 2019-04-18 ENCOUNTER — TELEPHONE (OUTPATIENT)
Dept: FAMILY MEDICINE | Facility: CLINIC | Age: 49
End: 2019-04-18

## 2019-04-18 VITALS
WEIGHT: 212.75 LBS | DIASTOLIC BLOOD PRESSURE: 80 MMHG | HEART RATE: 70 BPM | RESPIRATION RATE: 18 BRPM | OXYGEN SATURATION: 97 % | TEMPERATURE: 98 F | HEIGHT: 70 IN | BODY MASS INDEX: 30.46 KG/M2 | SYSTOLIC BLOOD PRESSURE: 136 MMHG

## 2019-04-18 DIAGNOSIS — M10.072 ACUTE IDIOPATHIC GOUT INVOLVING TOE OF LEFT FOOT: ICD-10-CM

## 2019-04-18 PROCEDURE — 3008F PR BODY MASS INDEX (BMI) DOCUMENTED: ICD-10-PCS | Mod: CPTII,S$GLB,, | Performed by: INTERNAL MEDICINE

## 2019-04-18 PROCEDURE — 96372 PR INJECTION,THERAP/PROPH/DIAG2ST, IM OR SUBCUT: ICD-10-PCS | Mod: 59,S$GLB,, | Performed by: INTERNAL MEDICINE

## 2019-04-18 PROCEDURE — 96372 THER/PROPH/DIAG INJ SC/IM: CPT | Mod: 59,S$GLB,, | Performed by: INTERNAL MEDICINE

## 2019-04-18 PROCEDURE — 3079F DIAST BP 80-89 MM HG: CPT | Mod: CPTII,S$GLB,, | Performed by: INTERNAL MEDICINE

## 2019-04-18 PROCEDURE — 99214 OFFICE O/P EST MOD 30 MIN: CPT | Mod: 25,S$GLB,, | Performed by: INTERNAL MEDICINE

## 2019-04-18 PROCEDURE — 99999 PR PBB SHADOW E&M-EST. PATIENT-LVL III: CPT | Mod: PBBFAC,,, | Performed by: INTERNAL MEDICINE

## 2019-04-18 PROCEDURE — 3075F SYST BP GE 130 - 139MM HG: CPT | Mod: CPTII,S$GLB,, | Performed by: INTERNAL MEDICINE

## 2019-04-18 PROCEDURE — 3008F BODY MASS INDEX DOCD: CPT | Mod: CPTII,S$GLB,, | Performed by: INTERNAL MEDICINE

## 2019-04-18 PROCEDURE — 99999 PR PBB SHADOW E&M-EST. PATIENT-LVL III: ICD-10-PCS | Mod: PBBFAC,,, | Performed by: INTERNAL MEDICINE

## 2019-04-18 PROCEDURE — 3075F PR MOST RECENT SYSTOLIC BLOOD PRESS GE 130-139MM HG: ICD-10-PCS | Mod: CPTII,S$GLB,, | Performed by: INTERNAL MEDICINE

## 2019-04-18 PROCEDURE — 3079F PR MOST RECENT DIASTOLIC BLOOD PRESSURE 80-89 MM HG: ICD-10-PCS | Mod: CPTII,S$GLB,, | Performed by: INTERNAL MEDICINE

## 2019-04-18 PROCEDURE — 99214 PR OFFICE/OUTPT VISIT, EST, LEVL IV, 30-39 MIN: ICD-10-PCS | Mod: 25,S$GLB,, | Performed by: INTERNAL MEDICINE

## 2019-04-18 RX ORDER — COLCHICINE 0.6 MG/1
TABLET ORAL
Qty: 30 TABLET | Refills: 0 | Status: SHIPPED | OUTPATIENT
Start: 2019-04-18 | End: 2019-05-31 | Stop reason: SDUPTHER

## 2019-04-18 RX ORDER — METHYLPREDNISOLONE ACETATE 40 MG/ML
40 INJECTION, SUSPENSION INTRA-ARTICULAR; INTRALESIONAL; INTRAMUSCULAR; SOFT TISSUE ONCE
Status: COMPLETED | OUTPATIENT
Start: 2019-04-18 | End: 2019-04-18

## 2019-04-18 RX ADMIN — METHYLPREDNISOLONE ACETATE 40 MG: 40 INJECTION, SUSPENSION INTRA-ARTICULAR; INTRALESIONAL; INTRAMUSCULAR; SOFT TISSUE at 11:04

## 2019-04-18 NOTE — TELEPHONE ENCOUNTER
----- Message from Angelica Figueroa sent at 4/17/2019  3:14 PM CDT -----  Regarding: Cancel colonoscopy case  ?        04/17/2019  Medical Record # [id number]     Dear Negro Bell MD,    An order for the following procedure, Colonoscopy, was placed for Tommy Lamar Sr.. Three attempts have been made to reach your patient at [phone] (home) . Please follow up with the patient regarding scheduling this procedure. Also, the H&P is now greater than six months. Your client will need an updated H&P before the procedure can be rescheduled.     Once, you have contacted the patient and updated this information, either you or the patient may contact the LPN  between the hours of 9:30 a.m. and 6:00 p.m. at [phone].      Sincerely,  Angelica Figueroa

## 2019-04-18 NOTE — PROGRESS NOTES
SUBJECTIVE     Chief Complaint   Patient presents with    Gout       HPI  Tommy Lamar Sr. is a 49 y.o. male with multiple medical diagnoses as listed in the medical history and problem list that presents for evaluation of L gout pain x 1 day. Pt reports pain and swelling to the L great toe. This occurred after eating 2 lbs of crawfish earlier in the week. He has been compliant with Allopurinol, but has not taken any other meds for his symptoms since it just started last night. He is without any other complaints today.    PAST MEDICAL HISTORY:  Past Medical History:   Diagnosis Date    Gout     Nephrolithiasis        PAST SURGICAL HISTORY:  Past Surgical History:   Procedure Laterality Date    HERNIA REPAIR      KIDNEY SURGERY      TONSILLECTOMY         SOCIAL HISTORY:  Social History     Socioeconomic History    Marital status:      Spouse name: Not on file    Number of children: Not on file    Years of education: Not on file    Highest education level: Not on file   Occupational History    Not on file   Social Needs    Financial resource strain: Not on file    Food insecurity:     Worry: Not on file     Inability: Not on file    Transportation needs:     Medical: Not on file     Non-medical: Not on file   Tobacco Use    Smoking status: Never Smoker    Smokeless tobacco: Never Used   Substance and Sexual Activity    Alcohol use: No    Drug use: No    Sexual activity: Yes     Partners: Female     Birth control/protection: None   Lifestyle    Physical activity:     Days per week: Not on file     Minutes per session: Not on file    Stress: Not on file   Relationships    Social connections:     Talks on phone: Not on file     Gets together: Not on file     Attends Orthodox service: Not on file     Active member of club or organization: Not on file     Attends meetings of clubs or organizations: Not on file     Relationship status: Not on file   Other Topics Concern    Not on file  "  Social History Narrative    Not on file       FAMILY HISTORY:  Family History   Family history unknown: Yes       ALLERGIES AND MEDICATIONS: updated and reviewed.  Review of patient's allergies indicates:   Allergen Reactions    Pcn [penicillins] Rash     Current Outpatient Medications   Medication Sig Dispense Refill    allopurinol (ZYLOPRIM) 300 MG tablet Take 1 tablet (300 mg total) by mouth once daily. 90 tablet 3    colchicine (COLCRYS) 0.6 mg tablet Take 2 tablets by mouth at gouty flare up and take 1 tablet 1 hour later; okay to repeat tabs process again in 24 hours if needed 30 tablet 0    fluticasone (FLONASE) 50 mcg/actuation nasal spray       metoprolol tartrate (LOPRESSOR) 25 MG tablet Take 0.5 tablets (12.5 mg total) by mouth every evening. 45 tablet 3    omeprazole (PRILOSEC) 40 MG capsule TAKE ONE CAPSULE BY MOUTH DAILY *DO NOT CHEW/CRUSH/GRIND* 90 capsule 3     No current facility-administered medications for this visit.        ROS  Review of Systems   Constitutional: Negative for chills and fever.   HENT: Negative for hearing loss and sore throat.    Eyes: Negative for visual disturbance.   Respiratory: Negative for cough and shortness of breath.    Cardiovascular: Negative for chest pain, palpitations and leg swelling.   Gastrointestinal: Negative for abdominal pain, constipation, diarrhea, nausea and vomiting.   Genitourinary: Negative for dysuria, frequency and urgency.   Musculoskeletal: Negative for arthralgias (L great toe pain), joint swelling and myalgias.   Skin: Negative for rash and wound.   Neurological: Negative for headaches.   Psychiatric/Behavioral: Negative for agitation and confusion. The patient is not nervous/anxious.          OBJECTIVE     Physical Exam  Vitals:    04/18/19 1044   BP: 136/80   Pulse: 70   Resp: 18   Temp: 98.4 °F (36.9 °C)    Body mass index is 30.53 kg/m².  Weight: 96.5 kg (212 lb 11.9 oz)   Height: 5' 10" (177.8 cm)     Physical Exam   Constitutional: " He is oriented to person, place, and time. He appears well-developed and well-nourished. No distress.   HENT:   Head: Normocephalic and atraumatic.   Right Ear: External ear normal.   Left Ear: External ear normal.   Nose: Nose normal.   Mouth/Throat: Oropharynx is clear and moist.   Eyes: Conjunctivae and EOM are normal. Right eye exhibits no discharge. Left eye exhibits no discharge. No scleral icterus.   Neck: Normal range of motion. Neck supple. No JVD present. No tracheal deviation present.   Pulmonary/Chest: Effort normal. No respiratory distress.   Musculoskeletal: Normal range of motion. He exhibits edema (L great toe edematous at a MTP joint). He exhibits no deformity.   Neurological: He is alert and oriented to person, place, and time. He exhibits normal muscle tone. Coordination normal.   Skin: Skin is warm and dry. No rash noted. No erythema.   Psychiatric: He has a normal mood and affect. His behavior is normal. Judgment and thought content normal.         Health Maintenance       Date Due Completion Date    Fecal Occult Blood Test (FOBT)/FitKit 1968 ---    TETANUS VACCINE 01/23/1986 ---    Lipid Panel 08/30/2023 8/30/2018            ASSESSMENT     49 y.o. male with     1. Acute idiopathic gout involving toe of left foot        PLAN:     1. Acute idiopathic gout involving toe of left foot  - Stable; no acute issues  - Pt to resume Allopurinol after acute gouty flare up resolved  - colchicine (COLCRYS) 0.6 mg tablet; Take 2 tablets by mouth at gouty flare up and take 1 tablet 1 hour later; okay to repeat tabs process again in 24 hours if needed  Dispense: 30 tablet; Refill: 0  - methylPREDNISolone acetate injection 40 mg        RTC in 1-2 weeks as needed for any acute worsening of current condition or failure to improve       Radha Mills MD  04/18/2019 11:08 AM        No follow-ups on file.

## 2019-04-24 ENCOUNTER — OFFICE VISIT (OUTPATIENT)
Dept: CARDIOLOGY | Facility: CLINIC | Age: 49
End: 2019-04-24
Payer: COMMERCIAL

## 2019-04-24 VITALS
SYSTOLIC BLOOD PRESSURE: 132 MMHG | WEIGHT: 213.38 LBS | OXYGEN SATURATION: 98 % | BODY MASS INDEX: 30.55 KG/M2 | HEART RATE: 80 BPM | HEIGHT: 70 IN | DIASTOLIC BLOOD PRESSURE: 68 MMHG | RESPIRATION RATE: 15 BRPM

## 2019-04-24 DIAGNOSIS — I25.3 ATRIAL SEPTAL ANEURYSM: ICD-10-CM

## 2019-04-24 DIAGNOSIS — I47.10 PSVT (PAROXYSMAL SUPRAVENTRICULAR TACHYCARDIA): Primary | ICD-10-CM

## 2019-04-24 PROCEDURE — 93000 EKG 12-LEAD: ICD-10-PCS | Mod: S$GLB,,, | Performed by: INTERNAL MEDICINE

## 2019-04-24 PROCEDURE — 3008F BODY MASS INDEX DOCD: CPT | Mod: CPTII,S$GLB,, | Performed by: INTERNAL MEDICINE

## 2019-04-24 PROCEDURE — 3008F PR BODY MASS INDEX (BMI) DOCUMENTED: ICD-10-PCS | Mod: CPTII,S$GLB,, | Performed by: INTERNAL MEDICINE

## 2019-04-24 PROCEDURE — 3075F SYST BP GE 130 - 139MM HG: CPT | Mod: CPTII,S$GLB,, | Performed by: INTERNAL MEDICINE

## 2019-04-24 PROCEDURE — 3078F DIAST BP <80 MM HG: CPT | Mod: CPTII,S$GLB,, | Performed by: INTERNAL MEDICINE

## 2019-04-24 PROCEDURE — 93000 ELECTROCARDIOGRAM COMPLETE: CPT | Mod: S$GLB,,, | Performed by: INTERNAL MEDICINE

## 2019-04-24 PROCEDURE — 99213 OFFICE O/P EST LOW 20 MIN: CPT | Mod: S$GLB,,, | Performed by: INTERNAL MEDICINE

## 2019-04-24 PROCEDURE — 3075F PR MOST RECENT SYSTOLIC BLOOD PRESS GE 130-139MM HG: ICD-10-PCS | Mod: CPTII,S$GLB,, | Performed by: INTERNAL MEDICINE

## 2019-04-24 PROCEDURE — 99999 PR PBB SHADOW E&M-EST. PATIENT-LVL III: ICD-10-PCS | Mod: PBBFAC,,, | Performed by: INTERNAL MEDICINE

## 2019-04-24 PROCEDURE — 99999 PR PBB SHADOW E&M-EST. PATIENT-LVL III: CPT | Mod: PBBFAC,,, | Performed by: INTERNAL MEDICINE

## 2019-04-24 PROCEDURE — 99213 PR OFFICE/OUTPT VISIT, EST, LEVL III, 20-29 MIN: ICD-10-PCS | Mod: S$GLB,,, | Performed by: INTERNAL MEDICINE

## 2019-04-24 PROCEDURE — 3078F PR MOST RECENT DIASTOLIC BLOOD PRESSURE < 80 MM HG: ICD-10-PCS | Mod: CPTII,S$GLB,, | Performed by: INTERNAL MEDICINE

## 2019-04-24 RX ORDER — ASPIRIN 81 MG/1
81 TABLET ORAL DAILY
Qty: 90 TABLET | Refills: 3 | COMMUNITY
Start: 2019-04-24 | End: 2021-02-01

## 2019-04-24 NOTE — PROGRESS NOTES
CARDIOVASCULAR PROGRESS NOTE    REASON FOR CONSULT:   Tommy Lamar Sr. is a 49 y.o. male who presents for f/u PSVT.    PCP: Ray  HISTORY OF PRESENT ILLNESS:   The patient returns for follow-up.  In the interim since his last office visit, he had an episode of left great toe gout.  This seems to have resolved.  He denies any recurrent palpitations, nor any chest pain or shortness of breath.  He has had no lightheadedness, dizziness, syncope.  There has been no PND, orthopnea, or lower extremity edema.  He denies melena, hematuria, or claudicant symptoms.  The patient tells me that he stopped taking his metoprolol approximately 3 weeks ago and has not had any recurrent episodes of PSVT.    I reviewed the results of his echocardiogram which was essentially normal aside from the mention of an atrial septal aneurysm.  The patient has no history of CVA or TIA and I have suggested he initiate aspirin 81 mg daily as a prophylactic measure.    CARDIOVASCULAR HISTORY:   PSVT (Scanned CARDIAC MONITOR STRIPS 12/18/18)    PAST MEDICAL HISTORY:     Past Medical History:   Diagnosis Date    Gout     Nephrolithiasis        PAST SURGICAL HISTORY:     Past Surgical History:   Procedure Laterality Date    HERNIA REPAIR      KIDNEY SURGERY      TONSILLECTOMY         ALLERGIES AND MEDICATION:     Review of patient's allergies indicates:   Allergen Reactions    Pcn [penicillins] Rash        Medication List           Accurate as of 4/24/19  2:47 PM. If you have any questions, ask your nurse or doctor.               CONTINUE taking these medications    allopurinol 300 MG tablet  Commonly known as:  ZYLOPRIM  Take 1 tablet (300 mg total) by mouth once daily.     colchicine 0.6 mg tablet  Commonly known as:  COLCRYS  Take 2 tablets by mouth at gouty flare up and take 1 tablet 1 hour later; okay to repeat tabs process again in 24 hours if needed     fluticasone 50 mcg/actuation nasal spray  Commonly known as:  FLONASE      metoprolol tartrate 25 MG tablet  Commonly known as:  LOPRESSOR  Take 0.5 tablets (12.5 mg total) by mouth every evening.     omeprazole 40 MG capsule  Commonly known as:  PRILOSEC  TAKE ONE CAPSULE BY MOUTH DAILY *DO NOT CHEW/CRUSH/GRIND*            SOCIAL HISTORY:     Social History     Socioeconomic History    Marital status:      Spouse name: Not on file    Number of children: Not on file    Years of education: Not on file    Highest education level: Not on file   Occupational History    Not on file   Social Needs    Financial resource strain: Not on file    Food insecurity:     Worry: Not on file     Inability: Not on file    Transportation needs:     Medical: Not on file     Non-medical: Not on file   Tobacco Use    Smoking status: Never Smoker    Smokeless tobacco: Never Used   Substance and Sexual Activity    Alcohol use: No    Drug use: No    Sexual activity: Yes     Partners: Female     Birth control/protection: None   Lifestyle    Physical activity:     Days per week: Not on file     Minutes per session: Not on file    Stress: Not on file   Relationships    Social connections:     Talks on phone: Not on file     Gets together: Not on file     Attends Oriental orthodox service: Not on file     Active member of club or organization: Not on file     Attends meetings of clubs or organizations: Not on file     Relationship status: Not on file   Other Topics Concern    Not on file   Social History Narrative    Not on file       FAMILY HISTORY:     Family History   Family history unknown: Yes       REVIEW OF SYSTEMS:   Review of Systems   Constitutional: Negative for chills, diaphoresis and fever.   HENT: Negative for nosebleeds.    Eyes: Negative for blurred vision, double vision and photophobia.   Respiratory: Negative for hemoptysis, shortness of breath and wheezing.    Cardiovascular: Negative for chest pain, palpitations, orthopnea, claudication, leg swelling and PND.   Gastrointestinal:  "Negative for abdominal pain, blood in stool, heartburn, melena, nausea and vomiting.   Genitourinary: Negative for flank pain and hematuria.   Musculoskeletal: Negative for falls, myalgias and neck pain.   Skin: Negative for rash.   Neurological: Negative for dizziness, seizures, loss of consciousness, weakness and headaches.   Endo/Heme/Allergies: Negative for polydipsia. Does not bruise/bleed easily.   Psychiatric/Behavioral: Negative for depression and memory loss. The patient is not nervous/anxious.        PHYSICAL EXAM:     Vitals:    04/24/19 1436   BP: 132/68   Pulse: 80   Resp: 15    Body mass index is 30.62 kg/m².  Weight: 96.8 kg (213 lb 6.5 oz)   Height: 5' 10" (177.8 cm)     Physical Exam   Constitutional: He is oriented to person, place, and time. He appears well-developed and well-nourished. He is cooperative.  Non-toxic appearance. No distress.   HENT:   Head: Normocephalic and atraumatic.   Eyes: Pupils are equal, round, and reactive to light. Conjunctivae and EOM are normal. No scleral icterus.   Neck: Trachea normal and normal range of motion. Neck supple. Normal carotid pulses and no JVD present. Carotid bruit is not present. No neck rigidity. No tracheal deviation and no edema present. No thyromegaly present.   Cardiovascular: Normal rate, regular rhythm, S1 normal and S2 normal. PMI is not displaced. Exam reveals no gallop and no friction rub.   No murmur heard.  Pulses:       Carotid pulses are 2+ on the right side, and 2+ on the left side.  Pulmonary/Chest: Effort normal and breath sounds normal. No stridor. No respiratory distress. He has no wheezes. He has no rales. He exhibits no tenderness.   Abdominal: Soft. He exhibits no distension. There is no hepatosplenomegaly.   Musculoskeletal: Normal range of motion. He exhibits no edema or tenderness.   Feet:   Right Foot:   Skin Integrity: Negative for ulcer.   Left Foot:   Skin Integrity: Negative for ulcer.   Neurological: He is alert and " oriented to person, place, and time. No cranial nerve deficit.   Skin: Skin is warm and dry. No rash noted. No erythema.   Psychiatric: He has a normal mood and affect. His speech is normal and behavior is normal.   Vitals reviewed.      DATA:   EKG: (personally reviewed tracing)  4/24/19 SR 80, LAD    Laboratory:  CBC:  Recent Labs   Lab 08/30/18  0806 12/18/18  1325 12/18/18  1351   WHITE BLOOD CELL COUNT 5.16 8.96  --    HEMOGLOBIN 13.9 L 14.9  --    POC HEMATOCRIT  --   --  39   HEMATOCRIT 40.0 42.9  --    PLATELETS 192 197  --        CHEMISTRIES:  Recent Labs   Lab 08/30/18  0806   GLUCOSE 83   SODIUM 139   POTASSIUM 4.1   BUN BLD 15   CREATININE 1.2   EGFR IF  >60   EGFR IF NON- >60   CALCIUM 9.1       CARDIAC BIOMARKERS:        COAGS:        LIPIDS/LFTS:  Recent Labs   Lab 08/30/18  0806   CHOLESTEROL 141   TRIGLYCERIDES 117   HDL 40   LDL CHOLESTEROL 77.6   NON-HDL CHOLESTEROL 101   AST 16   ALT 20     Lab Results   Component Value Date    TSH 1.528 12/18/2018     POC labs 12/18/2018  Creatinine 1.3  Potassium 3.7  Hematocrit 39  Urine drug screen negative      Cardiovascular Testing:  Echo 1/17/19  · Normal left ventricular systolic function. The estimated ejection fraction is 60%  · Normal LV diastolic function.  · Normal right ventricular systolic function.  · The estimated PA systolic pressure is 38 mm Hg  · Atrial septal aneurysm    ASSESSMENT:   # PSVT, no recurrence off metoprolol  # BMI 31, up 1 unit(s) vs last OV  # atrial septal aneurysm    PLAN:   Diet/exercise/weight loss  Start ASA 81mg qd  RTC prn      Himanshu Araya MD, FACC

## 2019-05-06 ENCOUNTER — TELEPHONE (OUTPATIENT)
Dept: FAMILY MEDICINE | Facility: CLINIC | Age: 49
End: 2019-05-06

## 2019-05-06 NOTE — TELEPHONE ENCOUNTER
----- Message from Teo Benson sent at 5/6/2019  4:32 PM CDT -----  Contact: pt  [phone]  .Type: Patient Call Back    Who called:pt    What is the request in detail:pt saw Dr. Mills for Gout and he states that It has returned and he would like something prescribed to him the can take care of the issue completely     Can the clinic reply by [facility]?no     Would the patient rather a call back or a response via [facility]? Call back    Best call back number:[phone]    Additional Information:pt ask for a call back

## 2019-05-31 DIAGNOSIS — M10.072 ACUTE IDIOPATHIC GOUT INVOLVING TOE OF LEFT FOOT: ICD-10-CM

## 2019-05-31 RX ORDER — COLCHICINE 0.6 MG/1
TABLET ORAL
Qty: 30 TABLET | Refills: 0 | Status: SHIPPED | OUTPATIENT
Start: 2019-05-31 | End: 2019-09-30 | Stop reason: SDUPTHER

## 2019-09-30 DIAGNOSIS — M10.072 ACUTE IDIOPATHIC GOUT INVOLVING TOE OF LEFT FOOT: ICD-10-CM

## 2019-09-30 RX ORDER — COLCHICINE 0.6 MG/1
TABLET ORAL
Qty: 3 TABLET | OUTPATIENT
Start: 2019-09-30

## 2019-09-30 RX ORDER — COLCHICINE 0.6 MG/1
TABLET ORAL
Qty: 30 TABLET | Refills: 0 | Status: SHIPPED | OUTPATIENT
Start: 2019-09-30 | End: 2019-10-09 | Stop reason: SDUPTHER

## 2019-09-30 NOTE — TELEPHONE ENCOUNTER
----- Message from Kelsi Holley sent at 9/30/2019 11:33 AM CDT -----  Contact: MARIA MOSLEY SR. [[id number]]  Please refill the medication(s) listed below :    Medication # 1 : colchicine (COLCRYS) 0.6 mg tablet    Medication # 2 :    Please call the patient when the prescription(s) is ready for  : [phone]    Can the clinic reply in [facility] : phone call only    Preferred Pharmacy : [facility] - [facility] - [place] - [phone]

## 2019-10-08 DIAGNOSIS — M10.072 ACUTE IDIOPATHIC GOUT INVOLVING TOE OF LEFT FOOT: ICD-10-CM

## 2019-10-09 RX ORDER — COLCHICINE 0.6 MG/1
TABLET ORAL
Qty: 30 TABLET | Refills: 0 | Status: SHIPPED | OUTPATIENT
Start: 2019-10-09 | End: 2019-11-08 | Stop reason: SDUPTHER

## 2019-10-15 DIAGNOSIS — Z12.11 COLON CANCER SCREENING: Primary | ICD-10-CM

## 2019-10-15 RX ORDER — METOPROLOL TARTRATE 25 MG/1
TABLET, FILM COATED ORAL
Refills: 3 | COMMUNITY
Start: 2019-10-02 | End: 2020-07-07

## 2019-10-16 ENCOUNTER — OFFICE VISIT (OUTPATIENT)
Dept: FAMILY MEDICINE | Facility: CLINIC | Age: 49
End: 2019-10-16
Payer: COMMERCIAL

## 2019-10-16 VITALS
WEIGHT: 208.31 LBS | BODY MASS INDEX: 31.57 KG/M2 | OXYGEN SATURATION: 98 % | SYSTOLIC BLOOD PRESSURE: 128 MMHG | HEART RATE: 73 BPM | DIASTOLIC BLOOD PRESSURE: 78 MMHG | TEMPERATURE: 98 F | HEIGHT: 68 IN

## 2019-10-16 DIAGNOSIS — Z00.00 HEALTH CARE MAINTENANCE: ICD-10-CM

## 2019-10-16 DIAGNOSIS — M10.362 ACUTE GOUT DUE TO RENAL IMPAIRMENT INVOLVING LEFT KNEE: Primary | ICD-10-CM

## 2019-10-16 DIAGNOSIS — Z23 NEEDS FLU SHOT: ICD-10-CM

## 2019-10-16 DIAGNOSIS — Z13.220 LIPID SCREENING: ICD-10-CM

## 2019-10-16 PROCEDURE — 90471 FLU VACCINE (QUAD) GREATER THAN OR EQUAL TO 3YO PRESERVATIVE FREE IM: ICD-10-PCS | Mod: S$GLB,,, | Performed by: INTERNAL MEDICINE

## 2019-10-16 PROCEDURE — 99214 PR OFFICE/OUTPT VISIT, EST, LEVL IV, 30-39 MIN: ICD-10-PCS | Mod: 25,S$GLB,, | Performed by: INTERNAL MEDICINE

## 2019-10-16 PROCEDURE — 99999 PR PBB SHADOW E&M-EST. PATIENT-LVL III: ICD-10-PCS | Mod: PBBFAC,,, | Performed by: INTERNAL MEDICINE

## 2019-10-16 PROCEDURE — 3008F BODY MASS INDEX DOCD: CPT | Mod: CPTII,S$GLB,, | Performed by: INTERNAL MEDICINE

## 2019-10-16 PROCEDURE — 3008F PR BODY MASS INDEX (BMI) DOCUMENTED: ICD-10-PCS | Mod: CPTII,S$GLB,, | Performed by: INTERNAL MEDICINE

## 2019-10-16 PROCEDURE — 90686 FLU VACCINE (QUAD) GREATER THAN OR EQUAL TO 3YO PRESERVATIVE FREE IM: ICD-10-PCS | Mod: S$GLB,,, | Performed by: INTERNAL MEDICINE

## 2019-10-16 PROCEDURE — 3078F DIAST BP <80 MM HG: CPT | Mod: CPTII,S$GLB,, | Performed by: INTERNAL MEDICINE

## 2019-10-16 PROCEDURE — 90686 IIV4 VACC NO PRSV 0.5 ML IM: CPT | Mod: S$GLB,,, | Performed by: INTERNAL MEDICINE

## 2019-10-16 PROCEDURE — 3078F PR MOST RECENT DIASTOLIC BLOOD PRESSURE < 80 MM HG: ICD-10-PCS | Mod: CPTII,S$GLB,, | Performed by: INTERNAL MEDICINE

## 2019-10-16 PROCEDURE — 3074F SYST BP LT 130 MM HG: CPT | Mod: CPTII,S$GLB,, | Performed by: INTERNAL MEDICINE

## 2019-10-16 PROCEDURE — 3074F PR MOST RECENT SYSTOLIC BLOOD PRESSURE < 130 MM HG: ICD-10-PCS | Mod: CPTII,S$GLB,, | Performed by: INTERNAL MEDICINE

## 2019-10-16 PROCEDURE — 99999 PR PBB SHADOW E&M-EST. PATIENT-LVL III: CPT | Mod: PBBFAC,,, | Performed by: INTERNAL MEDICINE

## 2019-10-16 PROCEDURE — 99214 OFFICE O/P EST MOD 30 MIN: CPT | Mod: 25,S$GLB,, | Performed by: INTERNAL MEDICINE

## 2019-10-16 PROCEDURE — 90471 IMMUNIZATION ADMIN: CPT | Mod: S$GLB,,, | Performed by: INTERNAL MEDICINE

## 2019-10-16 NOTE — PROGRESS NOTES
Patient states he will complete the fitkit this week!!!  Informed patient shingles and tetanus in overdue.

## 2019-10-16 NOTE — PROGRESS NOTES
SUBJECTIVE     Chief Complaint   Patient presents with    Gout     left foot       HPI  Tommy Lamar Sr. is a 49 y.o. male with multiple medical diagnoses as listed in the medical history and problem list that presents for evaluation of L gout pain x 2 weeks. Pt reports pain and swelling to the L great toe. Pt denies having a high purine diet or alcohol. He has been to the urgent care 3 times in the past 2 weeks with 3 rounds for Toradol, 2 steroids, 1 Medrol Dose pack, and 3 steroid tabs given during this time frame. His pain is currently at a 1/10. Pt has not been compliant with Allopurinol, but is ready to start taking now.     PAST MEDICAL HISTORY:  Past Medical History:   Diagnosis Date    Gout     Nephrolithiasis        PAST SURGICAL HISTORY:  Past Surgical History:   Procedure Laterality Date    HERNIA REPAIR      KIDNEY SURGERY      TONSILLECTOMY         SOCIAL HISTORY:  Social History     Socioeconomic History    Marital status:      Spouse name: Not on file    Number of children: Not on file    Years of education: Not on file    Highest education level: Not on file   Occupational History    Not on file   Social Needs    Financial resource strain: Not on file    Food insecurity:     Worry: Not on file     Inability: Not on file    Transportation needs:     Medical: Not on file     Non-medical: Not on file   Tobacco Use    Smoking status: Never Smoker    Smokeless tobacco: Never Used   Substance and Sexual Activity    Alcohol use: No    Drug use: No    Sexual activity: Yes     Partners: Female     Birth control/protection: None   Lifestyle    Physical activity:     Days per week: Not on file     Minutes per session: Not on file    Stress: Not at all   Relationships    Social connections:     Talks on phone: Not on file     Gets together: Not on file     Attends [employer] service: Not on file     Active member of club or organization: Not on file     Attends meetings of clubs  or organizations: Not on file     Relationship status: Not on file   Other Topics Concern    Not on file   Social History Narrative    Not on file       FAMILY HISTORY:  Family History   Family history unknown: Yes       ALLERGIES AND MEDICATIONS: updated and reviewed.  Review of patient's allergies indicates:   Allergen Reactions    Pcn [penicillins] Rash     Current Outpatient Medications   Medication Sig Dispense Refill    metoprolol tartrate (LOPRESSOR) 25 MG tablet TAKE ONE-HALF TABLET BY MOUTH EVERY EVENING  3    omeprazole (PRILOSEC) 40 MG capsule TAKE ONE CAPSULE BY MOUTH DAILY *DO NOT CHEW/CRUSH/GRIND* 90 capsule 3    allopurinol (ZYLOPRIM) 300 MG tablet Take 1 tablet (300 mg total) by mouth once daily. (Patient not taking: Reported on 10/16/2019) 90 tablet 3    aspirin (ECOTRIN) 81 MG EC tablet Take 1 tablet (81 mg total) by mouth once daily. (Patient not taking: Reported on 10/16/2019) 90 tablet 3    colchicine (COLCRYS) 0.6 mg tablet TAKE 2 TABLETS BY MOUTH AT GOUT FLARE UP & ONE TABLET ONE HOUR LATER REPEAT IN 24 HOURS IF NEEDED (Patient not taking: Reported on 10/16/2019) 30 tablet 0    fluticasone (FLONASE) 50 mcg/actuation nasal spray        No current facility-administered medications for this visit.        ROS  Review of Systems   Constitutional: Negative for chills and fever.   HENT: Negative for hearing loss and sore throat.    Eyes: Negative for visual disturbance.   Respiratory: Negative for cough and shortness of breath.    Cardiovascular: Negative for chest pain, palpitations and leg swelling.   Gastrointestinal: Negative for abdominal pain, constipation, diarrhea, nausea and vomiting.   Genitourinary: Negative for dysuria, frequency and urgency.   Musculoskeletal: Positive for arthralgias (L great toe pain). Negative for joint swelling and myalgias.   Skin: Negative for rash and wound.   Neurological: Negative for headaches.   Psychiatric/Behavioral: Negative for agitation and  "confusion. The patient is not nervous/anxious.          OBJECTIVE     Physical Exam  Vitals:    10/16/19 1553   BP: 128/78   Pulse: 73   Temp: 98.3 °F (36.8 °C)    Body mass index is 31.68 kg/m².  Weight: 94.5 kg (208 lb 5.4 oz)   Height: 5' 8" (172.7 cm)     Physical Exam   Constitutional: He is oriented to person, place, and time. He appears well-developed and well-nourished. No distress.   HENT:   Head: Normocephalic and atraumatic.   Right Ear: Hearing, tympanic membrane and external ear normal.   Left Ear: Hearing, tympanic membrane and external ear normal.   Nose: Nose normal. No rhinorrhea.   Mouth/Throat: Oropharynx is clear and moist. No uvula swelling. No posterior oropharyngeal edema or posterior oropharyngeal erythema.   Eyes: Conjunctivae and EOM are normal. Right eye exhibits no discharge. Left eye exhibits no discharge. No scleral icterus.   Neck: Normal range of motion. Neck supple. No JVD present. No tracheal deviation present.   Cardiovascular: Normal rate, regular rhythm, normal heart sounds and intact distal pulses. Exam reveals no gallop and no friction rub.   No murmur heard.  Pulmonary/Chest: Effort normal and breath sounds normal. No respiratory distress. He has no wheezes.   Abdominal: Soft. Bowel sounds are normal. He exhibits no distension and no mass. There is no tenderness. There is no rebound and no guarding.   Musculoskeletal: Normal range of motion. He exhibits no edema, tenderness or deformity.   Neurological: He is alert and oriented to person, place, and time. He exhibits normal muscle tone. Coordination normal.   Skin: Skin is warm and dry. No rash noted. No erythema.   Psychiatric: He has a normal mood and affect. His behavior is normal. Judgment and thought content normal.         Health Maintenance       Date Due Completion Date    Fecal Occult Blood Test (FOBT)/FitKit 1968 ---    TETANUS VACCINE 01/23/1986 ---    Shingles Vaccine (1 of 2) 01/23/2018 ---    Influenza " Vaccine (1) 09/01/2019 11/9/2018    Lipid Panel 08/30/2023 8/30/2018            ASSESSMENT     49 y.o. male with     1. Acute gout due to renal impairment involving left knee    2. Needs flu shot    3. Health care maintenance    4. Lipid screening        PLAN:     1. Acute gout due to renal impairment involving left knee  - Pt to take Indomethacin and/or Colcrys as previously prescribed  - Pt to start Allopurinol 300 mg po q24 after acute gouty flare up resolved  - Uric acid; Future    2. Needs flu shot  - Influenza - Quadrivalent (PF)    3. Health care maintenance  - Comprehensive metabolic panel; Future  - CBC auto differential; Future  - TSH; Future  - Hemoglobin A1c; Future    4. Lipid screening  - Lipid panel; Future        RTC in 1-2 weeks as needed for any acute worsening of current condition or failure to improve       Radha Mills MD  10/16/2019 4:09 PM        No follow-ups on file.

## 2019-10-21 ENCOUNTER — LAB VISIT (OUTPATIENT)
Dept: LAB | Facility: HOSPITAL | Age: 49
End: 2019-10-21
Attending: INTERNAL MEDICINE
Payer: COMMERCIAL

## 2019-10-21 DIAGNOSIS — Z00.00 HEALTH CARE MAINTENANCE: ICD-10-CM

## 2019-10-21 DIAGNOSIS — M10.362 ACUTE GOUT DUE TO RENAL IMPAIRMENT INVOLVING LEFT KNEE: ICD-10-CM

## 2019-10-21 DIAGNOSIS — Z13.220 LIPID SCREENING: ICD-10-CM

## 2019-10-21 LAB
ALBUMIN SERPL BCP-MCNC: 4 G/DL (ref 3.5–5.2)
ALP SERPL-CCNC: 73 U/L (ref 55–135)
ALT SERPL W/O P-5'-P-CCNC: 32 U/L (ref 10–44)
ANION GAP SERPL CALC-SCNC: 7 MMOL/L (ref 8–16)
AST SERPL-CCNC: 21 U/L (ref 10–40)
BASOPHILS # BLD AUTO: 0.03 K/UL (ref 0–0.2)
BASOPHILS NFR BLD: 0.6 % (ref 0–1.9)
BILIRUB SERPL-MCNC: 0.6 MG/DL (ref 0.1–1)
BUN SERPL-MCNC: 10 MG/DL (ref 6–20)
CALCIUM SERPL-MCNC: 9.5 MG/DL (ref 8.7–10.5)
CHLORIDE SERPL-SCNC: 105 MMOL/L (ref 95–110)
CHOLEST SERPL-MCNC: 158 MG/DL (ref 120–199)
CHOLEST/HDLC SERPL: 4 {RATIO} (ref 2–5)
CO2 SERPL-SCNC: 28 MMOL/L (ref 23–29)
CREAT SERPL-MCNC: 1.2 MG/DL (ref 0.5–1.4)
DIFFERENTIAL METHOD: ABNORMAL
EOSINOPHIL # BLD AUTO: 0.3 K/UL (ref 0–0.5)
EOSINOPHIL NFR BLD: 5.4 % (ref 0–8)
ERYTHROCYTE [DISTWIDTH] IN BLOOD BY AUTOMATED COUNT: 12.2 % (ref 11.5–14.5)
EST. GFR  (AFRICAN AMERICAN): >60 ML/MIN/1.73 M^2
EST. GFR  (NON AFRICAN AMERICAN): >60 ML/MIN/1.73 M^2
ESTIMATED AVG GLUCOSE: 108 MG/DL (ref 68–131)
GLUCOSE SERPL-MCNC: 88 MG/DL (ref 70–110)
HBA1C MFR BLD HPLC: 5.4 % (ref 4–5.6)
HCT VFR BLD AUTO: 43.5 % (ref 40–54)
HDLC SERPL-MCNC: 40 MG/DL (ref 40–75)
HDLC SERPL: 25.3 % (ref 20–50)
HGB BLD-MCNC: 14.5 G/DL (ref 14–18)
IMM GRANULOCYTES # BLD AUTO: 0.02 K/UL (ref 0–0.04)
IMM GRANULOCYTES NFR BLD AUTO: 0.4 % (ref 0–0.5)
LDLC SERPL CALC-MCNC: 84 MG/DL (ref 63–159)
LYMPHOCYTES # BLD AUTO: 1.9 K/UL (ref 1–4.8)
LYMPHOCYTES NFR BLD: 38.8 % (ref 18–48)
MCH RBC QN AUTO: 29.9 PG (ref 27–31)
MCHC RBC AUTO-ENTMCNC: 33.3 G/DL (ref 32–36)
MCV RBC AUTO: 90 FL (ref 82–98)
MONOCYTES # BLD AUTO: 0.5 K/UL (ref 0.3–1)
MONOCYTES NFR BLD: 9.8 % (ref 4–15)
NEUTROPHILS # BLD AUTO: 2.2 K/UL (ref 1.8–7.7)
NEUTROPHILS NFR BLD: 45 % (ref 38–73)
NONHDLC SERPL-MCNC: 118 MG/DL
NRBC BLD-RTO: 0 /100 WBC
PLATELET # BLD AUTO: 201 K/UL (ref 150–350)
PMV BLD AUTO: 9 FL (ref 9.2–12.9)
POTASSIUM SERPL-SCNC: 4.5 MMOL/L (ref 3.5–5.1)
PROT SERPL-MCNC: 7.7 G/DL (ref 6–8.4)
RBC # BLD AUTO: 4.85 M/UL (ref 4.6–6.2)
SODIUM SERPL-SCNC: 140 MMOL/L (ref 136–145)
TRIGL SERPL-MCNC: 170 MG/DL (ref 30–150)
TSH SERPL DL<=0.005 MIU/L-ACNC: 0.99 UIU/ML (ref 0.4–4)
URATE SERPL-MCNC: 8.8 MG/DL (ref 3.4–7)
WBC # BLD AUTO: 4.8 K/UL (ref 3.9–12.7)

## 2019-10-21 PROCEDURE — 83036 HEMOGLOBIN GLYCOSYLATED A1C: CPT

## 2019-10-21 PROCEDURE — 84443 ASSAY THYROID STIM HORMONE: CPT

## 2019-10-21 PROCEDURE — 80053 COMPREHEN METABOLIC PANEL: CPT

## 2019-10-21 PROCEDURE — 84550 ASSAY OF BLOOD/URIC ACID: CPT

## 2019-10-21 PROCEDURE — 80061 LIPID PANEL: CPT

## 2019-10-21 PROCEDURE — 85025 COMPLETE CBC W/AUTO DIFF WBC: CPT

## 2019-11-08 DIAGNOSIS — M10.072 ACUTE IDIOPATHIC GOUT INVOLVING TOE OF LEFT FOOT: ICD-10-CM

## 2019-11-08 DIAGNOSIS — E79.0 HYPERURICEMIA: ICD-10-CM

## 2019-11-08 RX ORDER — ALLOPURINOL 300 MG/1
300 TABLET ORAL DAILY
Qty: 90 TABLET | Refills: 3 | Status: SHIPPED | OUTPATIENT
Start: 2019-11-08 | End: 2020-02-10

## 2019-11-08 RX ORDER — COLCHICINE 0.6 MG/1
TABLET ORAL
Qty: 30 TABLET | Refills: 0 | Status: SHIPPED | OUTPATIENT
Start: 2019-11-08 | End: 2019-12-13 | Stop reason: SDUPTHER

## 2019-11-08 NOTE — TELEPHONE ENCOUNTER
----- Message from Merna Fernando sent at 11/8/2019  8:19 AM CST -----  Contact: self  Type: Patient Call Back    Who called:self    What is the request in detail:pt is asking to speak to nurse to get medication for Gout    Can the clinic reply by [facility]?no    Would the patient rather a call back or a response via [facility]? call    Best call back number:

## 2019-11-08 NOTE — TELEPHONE ENCOUNTER
Last Office Visit Info:   The patient's last visit with Negro Bell MD was on 9/18/2018.    The patient's last visit in current department was on 10/16/2019.        Last CBC Results:   Lab Results   Component Value Date    WBC 4.80 10/21/2019    HGB 14.5 10/21/2019    HCT 43.5 10/21/2019     10/21/2019       Last CMP Results  Lab Results   Component Value Date     10/21/2019    K 4.5 10/21/2019     10/21/2019    CO2 28 10/21/2019    BUN 10 10/21/2019    CREATININE 1.2 10/21/2019    CALCIUM 9.5 10/21/2019    ALBUMIN 4.0 10/21/2019    AST 21 10/21/2019    ALT 32 10/21/2019       Last Lipids  Lab Results   Component Value Date    CHOL 158 10/21/2019    TRIG 170 (H) 10/21/2019    HDL 40 10/21/2019    LDLCALC 84.0 10/21/2019       Last A1C  Lab Results   Component Value Date    HGBA1C 5.4 10/21/2019       Last TSH  Lab Results   Component Value Date    TSH 0.992 10/21/2019         Current Med Refills  Medication List with Changes/Refills   Current Medications    ALLOPURINOL (ZYLOPRIM) 300 MG TABLET    Take 1 tablet (300 mg total) by mouth once daily.       Start Date: 2/23/2019 End Date: --    ASPIRIN (ECOTRIN) 81 MG EC TABLET    Take 1 tablet (81 mg total) by mouth once daily.       Start Date: 4/24/2019 End Date: --    COLCHICINE (COLCRYS) 0.6 MG TABLET    TAKE 2 TABLETS BY MOUTH AT GOUT FLARE UP & ONE TABLET ONE HOUR LATER REPEAT IN 24 HOURS IF NEEDED       Start Date: 10/9/2019 End Date: --    FLUTICASONE (FLONASE) 50 MCG/ACTUATION NASAL SPRAY           Start Date: 3/26/2018 End Date: --    METOPROLOL TARTRATE (LOPRESSOR) 25 MG TABLET    TAKE ONE-HALF TABLET BY MOUTH EVERY EVENING       Start Date: 10/2/2019 End Date: --    OMEPRAZOLE (PRILOSEC) 40 MG CAPSULE    TAKE ONE CAPSULE BY MOUTH DAILY *DO NOT CHEW/CRUSH/GRIND*       Start Date: 4/17/2019 End Date: --       Order(s) placed per written order guidelines: none    Please advise.

## 2019-11-12 ENCOUNTER — OFFICE VISIT (OUTPATIENT)
Dept: FAMILY MEDICINE | Facility: CLINIC | Age: 49
End: 2019-11-12
Payer: COMMERCIAL

## 2019-11-12 VITALS
HEART RATE: 79 BPM | RESPIRATION RATE: 16 BRPM | TEMPERATURE: 98 F | OXYGEN SATURATION: 97 % | WEIGHT: 208.75 LBS | HEIGHT: 68 IN | BODY MASS INDEX: 31.64 KG/M2 | DIASTOLIC BLOOD PRESSURE: 88 MMHG | SYSTOLIC BLOOD PRESSURE: 130 MMHG

## 2019-11-12 DIAGNOSIS — M10.9 GOUT OF LEFT KNEE, UNSPECIFIED CAUSE, UNSPECIFIED CHRONICITY: Primary | ICD-10-CM

## 2019-11-12 PROCEDURE — 3079F DIAST BP 80-89 MM HG: CPT | Mod: CPTII,S$GLB,, | Performed by: PHYSICIAN ASSISTANT

## 2019-11-12 PROCEDURE — 3008F PR BODY MASS INDEX (BMI) DOCUMENTED: ICD-10-PCS | Mod: CPTII,S$GLB,, | Performed by: PHYSICIAN ASSISTANT

## 2019-11-12 PROCEDURE — 99214 OFFICE O/P EST MOD 30 MIN: CPT | Mod: 25,S$GLB,, | Performed by: PHYSICIAN ASSISTANT

## 2019-11-12 PROCEDURE — 3075F PR MOST RECENT SYSTOLIC BLOOD PRESS GE 130-139MM HG: ICD-10-PCS | Mod: CPTII,S$GLB,, | Performed by: PHYSICIAN ASSISTANT

## 2019-11-12 PROCEDURE — 99214 PR OFFICE/OUTPT VISIT, EST, LEVL IV, 30-39 MIN: ICD-10-PCS | Mod: 25,S$GLB,, | Performed by: PHYSICIAN ASSISTANT

## 2019-11-12 PROCEDURE — 96372 PR INJECTION,THERAP/PROPH/DIAG2ST, IM OR SUBCUT: ICD-10-PCS | Mod: S$GLB,,, | Performed by: PHYSICIAN ASSISTANT

## 2019-11-12 PROCEDURE — 3079F PR MOST RECENT DIASTOLIC BLOOD PRESSURE 80-89 MM HG: ICD-10-PCS | Mod: CPTII,S$GLB,, | Performed by: PHYSICIAN ASSISTANT

## 2019-11-12 PROCEDURE — 3008F BODY MASS INDEX DOCD: CPT | Mod: CPTII,S$GLB,, | Performed by: PHYSICIAN ASSISTANT

## 2019-11-12 PROCEDURE — 99999 PR PBB SHADOW E&M-EST. PATIENT-LVL IV: ICD-10-PCS | Mod: PBBFAC,,, | Performed by: PHYSICIAN ASSISTANT

## 2019-11-12 PROCEDURE — 96372 THER/PROPH/DIAG INJ SC/IM: CPT | Mod: S$GLB,,, | Performed by: PHYSICIAN ASSISTANT

## 2019-11-12 PROCEDURE — 99999 PR PBB SHADOW E&M-EST. PATIENT-LVL IV: CPT | Mod: PBBFAC,,, | Performed by: PHYSICIAN ASSISTANT

## 2019-11-12 PROCEDURE — 3075F SYST BP GE 130 - 139MM HG: CPT | Mod: CPTII,S$GLB,, | Performed by: PHYSICIAN ASSISTANT

## 2019-11-12 RX ORDER — METHYLPREDNISOLONE ACETATE 80 MG/ML
80 INJECTION, SUSPENSION INTRA-ARTICULAR; INTRALESIONAL; INTRAMUSCULAR; SOFT TISSUE
Status: COMPLETED | OUTPATIENT
Start: 2019-11-12 | End: 2019-11-12

## 2019-11-12 RX ADMIN — METHYLPREDNISOLONE ACETATE 80 MG: 80 INJECTION, SUSPENSION INTRA-ARTICULAR; INTRALESIONAL; INTRAMUSCULAR; SOFT TISSUE at 04:11

## 2019-11-12 NOTE — PROGRESS NOTES
Subjective:       Patient ID: Tommy Lamar Sr. is a 49 y.o. male with multiple medical diagnoses as listed in the medical history and problem list that presents for Gout (left knee)  .    Chief Complaint: Gout (left knee)      Knee Pain    The incident occurred 3 to 5 days ago. There was no injury mechanism. The quality of the pain is described as aching (throbbing ). The pain is at a severity of 7/10. The pain has been intermittent since onset. Pertinent negatives include no numbness or tingling. Associated symptoms comments: Swelling  Hot to touch . Treatments tried: colchine and indometacin      Review of Systems   Constitutional: Negative for chills and fever.   Musculoskeletal: Positive for arthralgias, gait problem and joint swelling.   Neurological: Negative for tingling and numbness.         PAST MEDICAL HISTORY:  Past Medical History:   Diagnosis Date    Gout     Nephrolithiasis        SOCIAL HISTORY:  Social History     Socioeconomic History    Marital status:      Spouse name: Not on file    Number of children: Not on file    Years of education: Not on file    Highest education level: Not on file   Occupational History    Not on file   Social Needs    Financial resource strain: Not on file    Food insecurity:     Worry: Not on file     Inability: Not on file    Transportation needs:     Medical: Not on file     Non-medical: Not on file   Tobacco Use    Smoking status: Never Smoker    Smokeless tobacco: Never Used   Substance and Sexual Activity    Alcohol use: No    Drug use: No    Sexual activity: Yes     Partners: Female     Birth control/protection: None   Lifestyle    Physical activity:     Days per week: Not on file     Minutes per session: Not on file    Stress: Not at all   Relationships    Social connections:     Talks on phone: Not on file     Gets together: Not on file     Attends Episcopal service: Not on file     Active member of club or organization: Not on file  "    Attends meetings of clubs or organizations: Not on file     Relationship status: Not on file   Other Topics Concern    Not on file   Social History Narrative    Not on file       ALLERGIES AND MEDICATIONS: updated and reviewed.  Review of patient's allergies indicates:   Allergen Reactions    Pcn [penicillins] Rash     Current Outpatient Medications   Medication Sig Dispense Refill    aspirin (ECOTRIN) 81 MG EC tablet Take 1 tablet (81 mg total) by mouth once daily. 90 tablet 3    colchicine (COLCRYS) 0.6 mg tablet TAKE 2 TABLETS BY MOUTH AT GOUT FLARE UP & ONE TABLET ONE HOUR LATER REPEAT IN 24 HOURS IF NEEDED 30 tablet 0    fluticasone (FLONASE) 50 mcg/actuation nasal spray       allopurinol (ZYLOPRIM) 300 MG tablet Take 1 tablet (300 mg total) by mouth once daily. (Patient not taking: Reported on 11/12/2019) 90 tablet 3    metoprolol tartrate (LOPRESSOR) 25 MG tablet TAKE ONE-HALF TABLET BY MOUTH EVERY EVENING  3    omeprazole (PRILOSEC) 40 MG capsule TAKE ONE CAPSULE BY MOUTH DAILY *DO NOT CHEW/CRUSH/GRIND* (Patient not taking: Reported on 11/12/2019) 90 capsule 3     Current Facility-Administered Medications   Medication Dose Route Frequency Provider Last Rate Last Dose    methylPREDNISolone acetate injection 80 mg  80 mg Intramuscular 1 time in Clinic/HOD SUSHANT Gruber             Objective:   /88   Pulse 79   Temp 98.1 °F (36.7 °C) (Oral)   Resp 16   Ht 5' 8" (1.727 m)   Wt 94.7 kg (208 lb 12.4 oz)   SpO2 97%   BMI 31.74 kg/m²      Physical Exam   Musculoskeletal:        Right knee: Normal.        Left knee: He exhibits swelling (warm). He exhibits no effusion and no erythema. Tenderness found.           Assessment:       1. Gout of left knee, unspecified cause, unspecified chronicity        Plan:       Gout of left knee, unspecified cause, unspecified chronicity  -     methylPREDNISolone acetate injection 80 mg    stopped allopurinol  Will consider started at 100 ad titrate up " once he is symptoms free for a week   Continue other medications as needed         No follow-ups on file.

## 2019-11-12 NOTE — PROGRESS NOTES
Administered Depo-Medrol 80mg/2mL IM to right upper outer glut. No s/s of any adverse reaction noted.

## 2019-12-06 RX ORDER — ALLOPURINOL 100 MG/1
100 TABLET ORAL DAILY
COMMUNITY
End: 2019-12-06 | Stop reason: SDUPTHER

## 2019-12-06 NOTE — TELEPHONE ENCOUNTER
----- Message from Tracey Espinal sent at 12/6/2019 11:46 AM CST -----  Contact: pt  Type: Patient Call Back    Who called:pt  What is the request in detail:please call pt in regards to his gout. Call pt    Can the clinic reply by [facility]?    Would the patient rather a call back or a response via My [facility]? call    Best call back number:[phone]    Additional Information:

## 2019-12-07 RX ORDER — ALLOPURINOL 100 MG/1
50 TABLET ORAL DAILY
Qty: 90 TABLET | Refills: 1 | Status: SHIPPED | OUTPATIENT
Start: 2019-12-07 | End: 2020-02-10

## 2019-12-13 DIAGNOSIS — M10.072 ACUTE IDIOPATHIC GOUT INVOLVING TOE OF LEFT FOOT: ICD-10-CM

## 2019-12-13 RX ORDER — COLCHICINE 0.6 MG/1
TABLET ORAL
Qty: 30 TABLET | Refills: 0 | Status: SHIPPED | OUTPATIENT
Start: 2019-12-13 | End: 2020-02-24 | Stop reason: SDUPTHER

## 2019-12-13 NOTE — TELEPHONE ENCOUNTER
----- Message from Sofya Marin sent at 12/13/2019 12:18 PM CST -----  Contact: Self [phone]  Type: RX Refill Request    Who Called: Self    Have you contacted your pharmacy:    Refill or New Rx:Refill    RX Name and Strength:colchicine (COLCRYS) 0.6 mg tablet    Preferred Pharmacy with phone number:   [facility] - [place] - [place] - [address]  [address]  [place] [address]  Phone: [phone] Fax: [phone]    Local or Mail Order:Local    Would the patient rather a call back or a response via My [facility]? Call back    Best Call Back Number: [phone]

## 2019-12-13 NOTE — TELEPHONE ENCOUNTER
----- Message from Sofya Marin sent at 12/13/2019 12:18 PM CST -----  Contact: Self [phone]  Type: RX Refill Request    Who Called: Self    Have you contacted your pharmacy:    Refill or New Rx:Refill    RX Name and Strength:colchicine (COLCRYS) 0.6 mg tablet    Preferred Pharmacy with phone number:   [facility] - [place] - [place] - [address]  [address]  [place] [address]  Phone: [phone] Fax: [phone]    Local or Mail Order:Local    Would the patient rather a call back or a response via [facility]? Call back    Best Call Back Number: [phone]

## 2019-12-13 NOTE — TELEPHONE ENCOUNTER
Last Office Visit Info:   The patient's last visit with Negro Bell MD was on 9/18/2018.    The patient's last visit in current department was on 11/12/2019.        Last CBC Results:   Lab Results   Component Value Date    WBC 4.80 10/21/2019    HGB 14.5 10/21/2019    HCT 43.5 10/21/2019     10/21/2019       Last CMP Results  Lab Results   Component Value Date     10/21/2019    K 4.5 10/21/2019     10/21/2019    CO2 28 10/21/2019    BUN 10 10/21/2019    CREATININE 1.2 10/21/2019    CALCIUM 9.5 10/21/2019    ALBUMIN 4.0 10/21/2019    AST 21 10/21/2019    ALT 32 10/21/2019       Last Lipids  Lab Results   Component Value Date    CHOL 158 10/21/2019    TRIG 170 (H) 10/21/2019    HDL 40 10/21/2019    LDLCALC 84.0 10/21/2019       Last A1C  Lab Results   Component Value Date    HGBA1C 5.4 10/21/2019       Last TSH  Lab Results   Component Value Date    TSH 0.992 10/21/2019         Current Med Refills  Medication List with Changes/Refills   Current Medications    ALLOPURINOL (ZYLOPRIM) 100 MG TABLET    Take 0.5 tablets (50 mg total) by mouth once daily.       Start Date: 12/7/2019 End Date: --    ALLOPURINOL (ZYLOPRIM) 300 MG TABLET    Take 1 tablet (300 mg total) by mouth once daily.       Start Date: 11/8/2019 End Date: --    ALLOPURINOL (ZYLOPRIM) 50 MG TAB TABLET    Take 100 mg by mouth.       Start Date: --        End Date: --    ASPIRIN (ECOTRIN) 81 MG EC TABLET    Take 1 tablet (81 mg total) by mouth once daily.       Start Date: 4/24/2019 End Date: --    COLCHICINE (COLCRYS) 0.6 MG TABLET    TAKE 2 TABLETS BY MOUTH AT GOUT FLARE UP & ONE TABLET ONE HOUR LATER REPEAT IN 24 HOURS IF NEEDED       Start Date: 11/8/2019 End Date: --    FLUTICASONE (FLONASE) 50 MCG/ACTUATION NASAL SPRAY           Start Date: 3/26/2018 End Date: --    METOPROLOL TARTRATE (LOPRESSOR) 25 MG TABLET    TAKE ONE-HALF TABLET BY MOUTH EVERY EVENING       Start Date: 10/2/2019 End Date: --    OMEPRAZOLE (PRILOSEC) 40 MG  CAPSULE    TAKE ONE CAPSULE BY MOUTH DAILY *DO NOT CHEW/CRUSH/GRIND*       Start Date: 4/17/2019 End Date: --       Order(s) placed per written order guidelines: none    Please advise.

## 2019-12-26 RX ORDER — PREDNISONE 50 MG/1
50 TABLET ORAL DAILY
Qty: 5 TABLET | Refills: 0 | Status: SHIPPED | OUTPATIENT
Start: 2019-12-26 | End: 2020-02-10 | Stop reason: ALTCHOICE

## 2019-12-26 RX ORDER — PREDNISONE 50 MG/1
50 TABLET ORAL
COMMUNITY
End: 2019-12-26 | Stop reason: SDUPTHER

## 2019-12-26 NOTE — TELEPHONE ENCOUNTER
----- Message from Felicitas Martin sent at 12/26/2019  1:49 PM CST -----  Contact: Tommy [phone]  Type: Patient Call Back    Who called:Padmini    What is the request in detail: The patient is requesting a steroid pack or toradol to be called into the pharmacy. The patient states that he has a case of the gout, but the colcrys medication he currently has is not working.    Can the clinic reply by [facility]?no    Would the patient rather a call back or a response via My [facility]? Call back     Best call back number:[phone]

## 2019-12-26 NOTE — TELEPHONE ENCOUNTER
Last Office Visit Info:   The patient's last visit with Negro Bell MD was on 9/18/2018.    The patient's last visit in current department was on 11/12/2019. With Daniel  10/16/19, 4/18/19 with         Last CBC Results:   Lab Results   Component Value Date    WBC 4.80 10/21/2019    HGB 14.5 10/21/2019    HCT 43.5 10/21/2019     10/21/2019       Last CMP Results  Lab Results   Component Value Date     10/21/2019    K 4.5 10/21/2019     10/21/2019    CO2 28 10/21/2019    BUN 10 10/21/2019    CREATININE 1.2 10/21/2019    CALCIUM 9.5 10/21/2019    ALBUMIN 4.0 10/21/2019    AST 21 10/21/2019    ALT 32 10/21/2019       Last Lipids  Lab Results   Component Value Date    CHOL 158 10/21/2019    TRIG 170 (H) 10/21/2019    HDL 40 10/21/2019    LDLCALC 84.0 10/21/2019       Last A1C  Lab Results   Component Value Date    HGBA1C 5.4 10/21/2019       Last TSH  Lab Results   Component Value Date    TSH 0.992 10/21/2019         Current Med Refills  Medication List with Changes/Refills   Current Medications    ALLOPURINOL (ZYLOPRIM) 100 MG TABLET    Take 0.5 tablets (50 mg total) by mouth once daily.       Start Date: 12/7/2019 End Date: --    ALLOPURINOL (ZYLOPRIM) 300 MG TABLET    Take 1 tablet (300 mg total) by mouth once daily.       Start Date: 11/8/2019 End Date: --    ALLOPURINOL (ZYLOPRIM) 50 MG TAB TABLET    Take 100 mg by mouth.       Start Date: --        End Date: --    ASPIRIN (ECOTRIN) 81 MG EC TABLET    Take 1 tablet (81 mg total) by mouth once daily.       Start Date: 4/24/2019 End Date: --    COLCHICINE (COLCRYS) 0.6 MG TABLET    TAKE 2 TABLETS BY MOUTH AT GOUT FLARE UP & ONE TABLET ONE HOUR LATER REPEAT IN 24 HOURS IF NEEDED       Start Date: 12/13/2019End Date: --    FLUTICASONE (FLONASE) 50 MCG/ACTUATION NASAL SPRAY           Start Date: 3/26/2018 End Date: --    METOPROLOL TARTRATE (LOPRESSOR) 25 MG TABLET    TAKE ONE-HALF TABLET BY MOUTH EVERY EVENING       Start Date: 10/2/2019 End  Date: --    OMEPRAZOLE (PRILOSEC) 40 MG CAPSULE    TAKE ONE CAPSULE BY MOUTH DAILY *DO NOT CHEW/CRUSH/GRIND*       Start Date: 4/17/2019 End Date: --

## 2020-02-10 ENCOUNTER — OFFICE VISIT (OUTPATIENT)
Dept: FAMILY MEDICINE | Facility: CLINIC | Age: 50
End: 2020-02-10
Payer: COMMERCIAL

## 2020-02-10 VITALS
SYSTOLIC BLOOD PRESSURE: 130 MMHG | OXYGEN SATURATION: 97 % | BODY MASS INDEX: 31.81 KG/M2 | TEMPERATURE: 99 F | WEIGHT: 209.88 LBS | HEIGHT: 68 IN | DIASTOLIC BLOOD PRESSURE: 80 MMHG | HEART RATE: 72 BPM

## 2020-02-10 DIAGNOSIS — J06.9 UPPER RESPIRATORY TRACT INFECTION, UNSPECIFIED TYPE: Primary | ICD-10-CM

## 2020-02-10 PROCEDURE — 99999 PR PBB SHADOW E&M-EST. PATIENT-LVL III: CPT | Mod: PBBFAC,,, | Performed by: FAMILY MEDICINE

## 2020-02-10 PROCEDURE — 99213 OFFICE O/P EST LOW 20 MIN: CPT | Mod: S$GLB,,, | Performed by: FAMILY MEDICINE

## 2020-02-10 PROCEDURE — 3079F DIAST BP 80-89 MM HG: CPT | Mod: CPTII,S$GLB,, | Performed by: FAMILY MEDICINE

## 2020-02-10 PROCEDURE — 99999 PR PBB SHADOW E&M-EST. PATIENT-LVL III: ICD-10-PCS | Mod: PBBFAC,,, | Performed by: FAMILY MEDICINE

## 2020-02-10 PROCEDURE — 99213 PR OFFICE/OUTPT VISIT, EST, LEVL III, 20-29 MIN: ICD-10-PCS | Mod: S$GLB,,, | Performed by: FAMILY MEDICINE

## 2020-02-10 PROCEDURE — 3075F PR MOST RECENT SYSTOLIC BLOOD PRESS GE 130-139MM HG: ICD-10-PCS | Mod: CPTII,S$GLB,, | Performed by: FAMILY MEDICINE

## 2020-02-10 PROCEDURE — 3008F PR BODY MASS INDEX (BMI) DOCUMENTED: ICD-10-PCS | Mod: CPTII,S$GLB,, | Performed by: FAMILY MEDICINE

## 2020-02-10 PROCEDURE — 3075F SYST BP GE 130 - 139MM HG: CPT | Mod: CPTII,S$GLB,, | Performed by: FAMILY MEDICINE

## 2020-02-10 PROCEDURE — 3008F BODY MASS INDEX DOCD: CPT | Mod: CPTII,S$GLB,, | Performed by: FAMILY MEDICINE

## 2020-02-10 PROCEDURE — 3079F PR MOST RECENT DIASTOLIC BLOOD PRESSURE 80-89 MM HG: ICD-10-PCS | Mod: CPTII,S$GLB,, | Performed by: FAMILY MEDICINE

## 2020-02-10 RX ORDER — PROMETHAZINE HYDROCHLORIDE AND DEXTROMETHORPHAN HYDROBROMIDE 6.25; 15 MG/5ML; MG/5ML
5 SYRUP ORAL EVERY 4 HOURS PRN
Qty: 118 ML | Refills: 0 | Status: SHIPPED | OUTPATIENT
Start: 2020-02-10 | End: 2020-02-20

## 2020-02-10 NOTE — PROGRESS NOTES
Subjective:       Patient ID: Tommy Lamar Sr. is a 50 y.o. male.    Chief Complaint: Nasal Congestion; Chest Congestion; and Headache    URI    This is a new problem. The current episode started in the past 7 days (3 days). There has been no fever. Associated symptoms include congestion, coughing, headaches, sneezing and wheezing. Pertinent negatives include no diarrhea, ear pain, nausea, rhinorrhea, sore throat or vomiting. He has tried decongestant (sudafed) for the symptoms. The treatment provided mild relief.     Past Medical History:   Diagnosis Date    Gout     Nephrolithiasis       Past Surgical History:   Procedure Laterality Date    HERNIA REPAIR      KIDNEY SURGERY      TONSILLECTOMY       Family History   Family history unknown: Yes     Social History     Socioeconomic History    Marital status:      Spouse name: Not on file    Number of children: Not on file    Years of education: Not on file    Highest education level: Not on file   Occupational History    Not on file   Social Needs    Financial resource strain: Not on file    Food insecurity:     Worry: Not on file     Inability: Not on file    Transportation needs:     Medical: Not on file     Non-medical: Not on file   Tobacco Use    Smoking status: Never Smoker    Smokeless tobacco: Never Used   Substance and Sexual Activity    Alcohol use: No    Drug use: No    Sexual activity: Yes     Partners: Female     Birth control/protection: None   Lifestyle    Physical activity:     Days per week: Not on file     Minutes per session: Not on file    Stress: Not at all   Relationships    Social connections:     Talks on phone: Not on file     Gets together: Not on file     Attends Hoahaoism service: Not on file     Active member of club or organization: Not on file     Attends meetings of clubs or organizations: Not on file     Relationship status: Not on file   Other Topics Concern    Not on file   Social History Narrative  "   Not on file       Review of Systems   HENT: Positive for congestion and sneezing. Negative for ear pain, rhinorrhea and sore throat.    Respiratory: Positive for cough and wheezing.    Gastrointestinal: Negative for diarrhea, nausea and vomiting.   Neurological: Positive for headaches.       Objective:       Vitals:    02/10/20 1042   BP: 130/80   Pulse: 72   Temp: 99 °F (37.2 °C)   TempSrc: Oral   SpO2: 97%   Weight: 95.2 kg (209 lb 14.1 oz)   Height: 5' 8" (1.727 m)       Physical Exam   Constitutional: He is oriented to person, place, and time. He appears well-developed and well-nourished. No distress.   HENT:   Head: Normocephalic and atraumatic.   Right Ear: External ear normal.   Left Ear: External ear normal.   Mouth/Throat: Oropharynx is clear and moist. No oropharyngeal exudate.   Neck: Normal range of motion. Neck supple.   Cardiovascular: Normal rate, regular rhythm and normal heart sounds. Exam reveals no gallop and no friction rub.   No murmur heard.  Pulmonary/Chest: Effort normal and breath sounds normal. No stridor. No respiratory distress. He has no wheezes. He has no rales. He exhibits no tenderness.   Lymphadenopathy:     He has cervical adenopathy.   Neurological: He is alert and oriented to person, place, and time.   Skin: He is not diaphoretic.       Assessment:       1. Upper respiratory tract infection, unspecified type        Plan:       Tommy was seen today for nasal congestion, chest congestion and headache.    Diagnoses and all orders for this visit:    Upper respiratory tract infection, unspecified type  -     promethazine-dextromethorphan (PROMETHAZINE-DM) 6.25-15 mg/5 mL Syrp; Take 5 mLs by mouth every 4 (four) hours as needed.           "

## 2020-02-24 DIAGNOSIS — M10.072 ACUTE IDIOPATHIC GOUT INVOLVING TOE OF LEFT FOOT: ICD-10-CM

## 2020-02-24 RX ORDER — COLCHICINE 0.6 MG/1
TABLET ORAL
Qty: 30 TABLET | Refills: 0 | Status: SHIPPED | OUTPATIENT
Start: 2020-02-24 | End: 2020-05-26 | Stop reason: SDUPTHER

## 2020-02-24 RX ORDER — PREDNISONE 50 MG/1
TABLET ORAL
Qty: 5 TABLET | Refills: 0 | Status: SHIPPED | OUTPATIENT
Start: 2020-02-24 | End: 2020-07-16

## 2020-02-24 NOTE — TELEPHONE ENCOUNTER
----- Message from Daphne Cho sent at 2/24/2020 10:40 AM CST -----  Contact: MARIA MOSLEY SR. [[id number]]  Name of Who is Calling: MARIA MOSLEY SR. [[id number]]      What is the request in detail: patient would like doctor to call medication for Gout sent to pharmacy. Please call     Can the clinic reply by [facility]: no    What Number to Call Back if not in [facility]: [phone]

## 2020-04-29 ENCOUNTER — PATIENT OUTREACH (OUTPATIENT)
Dept: ADMINISTRATIVE | Facility: HOSPITAL | Age: 50
End: 2020-04-29

## 2020-04-29 DIAGNOSIS — Z12.11 COLON CANCER SCREENING: Primary | ICD-10-CM

## 2020-05-14 ENCOUNTER — TELEPHONE (OUTPATIENT)
Dept: FAMILY MEDICINE | Facility: CLINIC | Age: 50
End: 2020-05-14

## 2020-05-14 ENCOUNTER — OFFICE VISIT (OUTPATIENT)
Dept: FAMILY MEDICINE | Facility: CLINIC | Age: 50
End: 2020-05-14
Payer: COMMERCIAL

## 2020-05-14 VITALS — TEMPERATURE: 98 F

## 2020-05-14 DIAGNOSIS — R51.9 SINUS HEADACHE: Primary | ICD-10-CM

## 2020-05-14 PROCEDURE — 99213 PR OFFICE/OUTPT VISIT, EST, LEVL III, 20-29 MIN: ICD-10-PCS | Mod: 95,,, | Performed by: FAMILY MEDICINE

## 2020-05-14 PROCEDURE — 99213 OFFICE O/P EST LOW 20 MIN: CPT | Mod: 95,,, | Performed by: FAMILY MEDICINE

## 2020-05-14 RX ORDER — MOMETASONE FUROATE 50 UG/1
2 SPRAY, METERED NASAL DAILY
Qty: 17 G | Refills: 11 | Status: SHIPPED | OUTPATIENT
Start: 2020-05-14 | End: 2020-07-16 | Stop reason: SDUPTHER

## 2020-05-14 RX ORDER — MOMETASONE FUROATE 50 UG/1
2 SPRAY, METERED NASAL DAILY
Qty: 17 G | Refills: 11 | Status: SHIPPED | OUTPATIENT
Start: 2020-05-14 | End: 2020-05-14 | Stop reason: SDUPTHER

## 2020-05-14 NOTE — PROGRESS NOTES
Subjective:       Patient ID: Tommy Lamar Sr. is a 50 y.o. male.    Chief Complaint: No chief complaint on file.    The patient location is: [place]  The chief complaint leading to consultation is: sinus  Visit type: audio only  Total time spent with patient: 15 minutes  Each patient to whom he or she provides medical services by telemedicine is:  (1) informed of the relationship between the physician and patient and the respective role of any other health care provider with respect to management of the patient; and (2) notified that he or she may decline to receive medical services by telemedicine and may withdraw from such care at any time.    Notes:       Sinus Problem   This is a new problem. The current episode started in the past 7 days (2 days). There has been no fever. Associated symptoms include congestion, headaches and sinus pressure. Pertinent negatives include no chills, coughing, ear pain, shortness of breath, sneezing or sore throat. Treatments tried: mucinex DM, FLONASE, EXCEDRIN MIGRAINE. The treatment provided mild relief.     Past Medical History:   Diagnosis Date    Gout     Nephrolithiasis       Past Surgical History:   Procedure Laterality Date    HERNIA REPAIR      KIDNEY SURGERY      TONSILLECTOMY       Family History   Family history unknown: Yes     Social History     Socioeconomic History    Marital status:      Spouse name: Not on file    Number of children: Not on file    Years of education: Not on file    Highest education level: Not on file   Occupational History    Not on file   Social Needs    Financial resource strain: Not on file    Food insecurity:     Worry: Not on file     Inability: Not on file    Transportation needs:     Medical: Not on file     Non-medical: Not on file   Tobacco Use    Smoking status: Never Smoker    Smokeless tobacco: Never Used   Substance and Sexual Activity    Alcohol use: No    Drug use: No    Sexual activity: Yes      Partners: Female     Birth control/protection: None   Lifestyle    Physical activity:     Days per week: Not on file     Minutes per session: Not on file    Stress: Not at all   Relationships    Social connections:     Talks on phone: Not on file     Gets together: Not on file     Attends Mosque service: Not on file     Active member of club or organization: Not on file     Attends meetings of clubs or organizations: Not on file     Relationship status: Not on file   Other Topics Concern    Not on file   Social History Narrative    Not on file       Review of Systems   Constitutional: Negative for chills and fever.   HENT: Positive for congestion and sinus pressure. Negative for ear pain, sneezing and sore throat.    Eyes: Negative for itching.   Respiratory: Negative for cough, shortness of breath and wheezing.    Neurological: Positive for headaches.       Objective:     .  Physical Exam    Assessment:       1. Sinus headache        Plan:       Diagnoses and all orders for this visit:    Sinus headache  -     mometasone (NASONEX) 50 mcg/actuation nasal spray; 2 sprays by Nasal route once daily.  CLARITIN PRN ALLERGIES.

## 2020-05-18 RX ORDER — OMEPRAZOLE 40 MG/1
CAPSULE, DELAYED RELEASE ORAL
Qty: 90 CAPSULE | Refills: 3 | Status: SHIPPED | OUTPATIENT
Start: 2020-05-18 | End: 2021-06-29 | Stop reason: SDUPTHER

## 2020-05-26 DIAGNOSIS — M10.072 ACUTE IDIOPATHIC GOUT INVOLVING TOE OF LEFT FOOT: ICD-10-CM

## 2020-05-26 RX ORDER — COLCHICINE 0.6 MG/1
TABLET ORAL
Qty: 30 TABLET | Refills: 0 | Status: SHIPPED | OUTPATIENT
Start: 2020-05-26 | End: 2020-06-16 | Stop reason: SDUPTHER

## 2020-05-26 NOTE — TELEPHONE ENCOUNTER
Last Office Visit Info:   The patient's last visit with Negro Bell MD was on 9/18/2018.    The patient's last visit in current department was on 5/14/2020. With         Last CBC Results:   Lab Results   Component Value Date    WBC 4.80 10/21/2019    HGB 14.5 10/21/2019    HCT 43.5 10/21/2019     10/21/2019       Last CMP Results  Lab Results   Component Value Date     10/21/2019    K 4.5 10/21/2019     10/21/2019    CO2 28 10/21/2019    BUN 10 10/21/2019    CREATININE 1.2 10/21/2019    CALCIUM 9.5 10/21/2019    ALBUMIN 4.0 10/21/2019    AST 21 10/21/2019    ALT 32 10/21/2019       Last Lipids  Lab Results   Component Value Date    CHOL 158 10/21/2019    TRIG 170 (H) 10/21/2019    HDL 40 10/21/2019    LDLCALC 84.0 10/21/2019       Last A1C  Lab Results   Component Value Date    HGBA1C 5.4 10/21/2019       Last TSH  Lab Results   Component Value Date    TSH 0.992 10/21/2019         Current Med Refills  Medication List with Changes/Refills   Current Medications    ALLOPURINOL (ZYLOPRIM) 50 MG TAB TABLET    Take 100 mg by mouth.       Start Date: --        End Date: --    ASPIRIN (ECOTRIN) 81 MG EC TABLET    Take 1 tablet (81 mg total) by mouth once daily.       Start Date: 4/24/2019 End Date: --    COLCHICINE (COLCRYS) 0.6 MG TABLET    TAKE 2 TABLETS BY MOUTH AT GOUT FLARE UP & ONE TABLET ONE HOUR LATER REPEAT IN 24 HOURS IF NEEDED       Start Date: 2/24/2020 End Date: --    FLUTICASONE (FLONASE) 50 MCG/ACTUATION NASAL SPRAY           Start Date: 3/26/2018 End Date: --    METOPROLOL TARTRATE (LOPRESSOR) 25 MG TABLET    TAKE ONE-HALF TABLET BY MOUTH EVERY EVENING       Start Date: 10/2/2019 End Date: --    MOMETASONE (NASONEX) 50 MCG/ACTUATION NASAL SPRAY    2 sprays by Nasal route once daily.       Start Date: 5/14/2020 End Date: --    OMEPRAZOLE (PRILOSEC) 40 MG CAPSULE    TAKE ONE CAPSULE BY MOUTH DAILY *DO NOT CHEW/CRUSH/GRIND*       Start Date: 5/18/2020 End Date: --    PREDNISONE  (DELTASONE) 50 MG TAB    TAKE ONE TABLET BY MOUTH EVERY DAY       Start Date: 2/24/2020 End Date: --

## 2020-05-26 NOTE — TELEPHONE ENCOUNTER
----- Message from Benson Smith sent at 5/26/2020 12:05 PM CDT -----  Contact: MARIA MOSLEY SR. [[id number]]      Can the clinic reply in [facility]: no      Please refill the medication(s) listed below. Please call the patient when the prescription(s) is ready for  at this phone number   [phone]      Medication #1 colchicine (COLCRYS) 0.6 mg tablet    Preferred Pharmacy: [facility] - [place]  [address]

## 2020-06-08 LAB — GASTROCULT GAST QL: NEGATIVE

## 2020-06-16 DIAGNOSIS — M10.072 ACUTE IDIOPATHIC GOUT INVOLVING TOE OF LEFT FOOT: ICD-10-CM

## 2020-06-16 RX ORDER — INDOMETHACIN 50 MG/1
CAPSULE ORAL
COMMUNITY
Start: 2020-04-26 | End: 2020-06-16 | Stop reason: SDUPTHER

## 2020-06-16 RX ORDER — INDOMETHACIN 50 MG/1
50 CAPSULE ORAL 2 TIMES DAILY WITH MEALS
Qty: 60 CAPSULE | Refills: 1 | Status: SHIPPED | OUTPATIENT
Start: 2020-06-16 | End: 2020-07-31 | Stop reason: SDUPTHER

## 2020-06-16 RX ORDER — COLCHICINE 0.6 MG/1
TABLET ORAL
Qty: 30 TABLET | Refills: 0 | Status: SHIPPED | OUTPATIENT
Start: 2020-06-16 | End: 2020-07-31 | Stop reason: SDUPTHER

## 2020-06-16 NOTE — TELEPHONE ENCOUNTER
----- Message from Maya Khalil sent at 6/16/2020  3:12 PM CDT -----  Regarding: Self/  [phone]  Type: RX Refill Request    Who Called:   Patient    Refill or New Rx:  Refill    RX Name and Strength:  colchicine (COLCRYS) 0.6 mg tablet                                           indomethacin (INDOCIN) 50 MG capsule       Preferred Pharmacy with phone number:  [facility] - [place] - [address]    Local or Mail Order:  Local    Ordering Provider:  CHAN Mills    Would the patient rather a call back or a response via [facility]?   Call back    Best Call Back Number:  [phone]

## 2020-06-16 NOTE — TELEPHONE ENCOUNTER
Last Office Visit Info:   The patient's last visit with Negro Bell MD was on 9/18/2018.    The patient's last visit in current department was on 5/14/2020.        Last CBC Results:   Lab Results   Component Value Date    WBC 4.80 10/21/2019    HGB 14.5 10/21/2019    HCT 43.5 10/21/2019     10/21/2019       Last CMP Results  Lab Results   Component Value Date     10/21/2019    K 4.5 10/21/2019     10/21/2019    CO2 28 10/21/2019    BUN 10 10/21/2019    CREATININE 1.2 10/21/2019    CALCIUM 9.5 10/21/2019    ALBUMIN 4.0 10/21/2019    AST 21 10/21/2019    ALT 32 10/21/2019       Last Lipids  Lab Results   Component Value Date    CHOL 158 10/21/2019    TRIG 170 (H) 10/21/2019    HDL 40 10/21/2019    LDLCALC 84.0 10/21/2019       Last A1C  Lab Results   Component Value Date    HGBA1C 5.4 10/21/2019       Last TSH  Lab Results   Component Value Date    TSH 0.992 10/21/2019         Current Med Refills  Medication List with Changes/Refills   Current Medications    ALLOPURINOL (ZYLOPRIM) 50 MG TAB TABLET    Take 100 mg by mouth.       Start Date: --        End Date: --    ASPIRIN (ECOTRIN) 81 MG EC TABLET    Take 1 tablet (81 mg total) by mouth once daily.       Start Date: 4/24/2019 End Date: --    COLCHICINE (COLCRYS) 0.6 MG TABLET    TAKE 2 TABLETS BY MOUTH AT GOUT FLARE UP & ONE TABLET ONE HOUR LATER REPEAT IN 24 HOURS IF NEEDED       Start Date: 5/26/2020 End Date: --    FLUTICASONE (FLONASE) 50 MCG/ACTUATION NASAL SPRAY           Start Date: 3/26/2018 End Date: --    METOPROLOL TARTRATE (LOPRESSOR) 25 MG TABLET    TAKE ONE-HALF TABLET BY MOUTH EVERY EVENING       Start Date: 10/2/2019 End Date: --    MOMETASONE (NASONEX) 50 MCG/ACTUATION NASAL SPRAY    2 sprays by Nasal route once daily.       Start Date: 5/14/2020 End Date: --    OMEPRAZOLE (PRILOSEC) 40 MG CAPSULE    TAKE ONE CAPSULE BY MOUTH DAILY *DO NOT CHEW/CRUSH/GRIND*       Start Date: 5/18/2020 End Date: --    PREDNISONE (DELTASONE) 50 MG  TAB    TAKE ONE TABLET BY MOUTH EVERY DAY       Start Date: 2/24/2020 End Date: --

## 2020-06-23 ENCOUNTER — PATIENT OUTREACH (OUTPATIENT)
Dept: ADMINISTRATIVE | Facility: HOSPITAL | Age: 50
End: 2020-06-23

## 2020-06-30 ENCOUNTER — HOSPITAL ENCOUNTER (EMERGENCY)
Facility: HOSPITAL | Age: 50
Discharge: HOME | End: 2020-07-01
Attending: EMERGENCY MEDICINE
Payer: COMMERCIAL

## 2020-06-30 DIAGNOSIS — R42 DIZZINESS: ICD-10-CM

## 2020-06-30 LAB
BASOPHILS # BLD AUTO: 0.05 K/UL (ref 0–0.2)
BASOPHILS NFR BLD: 0.6 % (ref 0–1.9)
BILIRUB UR QL STRIP: NEGATIVE
CLARITY UR: CLEAR
COLOR UR: YELLOW
DIFFERENTIAL METHOD: ABNORMAL
EOSINOPHIL # BLD AUTO: 0.1 K/UL (ref 0–0.5)
EOSINOPHIL NFR BLD: 1.2 % (ref 0–8)
ERYTHROCYTE [DISTWIDTH] IN BLOOD BY AUTOMATED COUNT: 12.2 % (ref 11.5–14.5)
GLUCOSE UR QL STRIP: NEGATIVE
HCT VFR BLD AUTO: 40.9 % (ref 40–54)
HGB BLD-MCNC: 13.8 G/DL (ref 14–18)
HGB UR QL STRIP: NEGATIVE
IMM GRANULOCYTES # BLD AUTO: 0.04 K/UL (ref 0–0.04)
IMM GRANULOCYTES NFR BLD AUTO: 0.5 % (ref 0–0.5)
KETONES UR QL STRIP: NEGATIVE
LEUKOCYTE ESTERASE UR QL STRIP: NEGATIVE
LYMPHOCYTES # BLD AUTO: 1.7 K/UL (ref 1–4.8)
LYMPHOCYTES NFR BLD: 21.2 % (ref 18–48)
MCH RBC QN AUTO: 30.1 PG (ref 27–31)
MCHC RBC AUTO-ENTMCNC: 33.7 G/DL (ref 32–36)
MCV RBC AUTO: 89 FL (ref 82–98)
MONOCYTES # BLD AUTO: 0.7 K/UL (ref 0.3–1)
MONOCYTES NFR BLD: 8.3 % (ref 4–15)
NEUTROPHILS # BLD AUTO: 5.6 K/UL (ref 1.8–7.7)
NEUTROPHILS NFR BLD: 68.2 % (ref 38–73)
NITRITE UR QL STRIP: NEGATIVE
NRBC BLD-RTO: 0 /100 WBC
PH UR STRIP: 6 [PH] (ref 5–8)
PLATELET # BLD AUTO: 203 K/UL (ref 150–350)
PMV BLD AUTO: 8.7 FL (ref 9.2–12.9)
PROT UR QL STRIP: NEGATIVE
RBC # BLD AUTO: 4.58 M/UL (ref 4.6–6.2)
SP GR UR STRIP: 1.02 (ref 1–1.03)
URN SPEC COLLECT METH UR: NORMAL
UROBILINOGEN UR STRIP-ACNC: NEGATIVE EU/DL
WBC # BLD AUTO: 8.17 K/UL (ref 3.9–12.7)

## 2020-06-30 PROCEDURE — 93010 EKG 12-LEAD: ICD-10-PCS | Mod: ,,, | Performed by: INTERNAL MEDICINE

## 2020-06-30 PROCEDURE — 96361 HYDRATE IV INFUSION ADD-ON: CPT

## 2020-06-30 PROCEDURE — 99285 EMERGENCY DEPT VISIT HI MDM: CPT | Mod: 25

## 2020-06-30 PROCEDURE — 84484 ASSAY OF TROPONIN QUANT: CPT

## 2020-06-30 PROCEDURE — 80053 COMPREHEN METABOLIC PANEL: CPT

## 2020-06-30 PROCEDURE — 85025 COMPLETE CBC W/AUTO DIFF WBC: CPT

## 2020-06-30 PROCEDURE — 96360 HYDRATION IV INFUSION INIT: CPT

## 2020-06-30 PROCEDURE — 93010 ELECTROCARDIOGRAM REPORT: CPT | Mod: ,,, | Performed by: INTERNAL MEDICINE

## 2020-06-30 PROCEDURE — 81003 URINALYSIS AUTO W/O SCOPE: CPT

## 2020-06-30 PROCEDURE — 93005 ELECTROCARDIOGRAM TRACING: CPT

## 2020-06-30 RX ORDER — SODIUM CHLORIDE 9 MG/ML
1000 INJECTION, SOLUTION INTRAVENOUS
Status: COMPLETED | OUTPATIENT
Start: 2020-07-01 | End: 2020-07-01

## 2020-06-30 RX ADMIN — SODIUM CHLORIDE 1000 ML: 0.9 INJECTION, SOLUTION INTRAVENOUS at 11:06

## 2020-07-01 VITALS
RESPIRATION RATE: 16 BRPM | HEART RATE: 73 BPM | OXYGEN SATURATION: 99 % | SYSTOLIC BLOOD PRESSURE: 155 MMHG | HEIGHT: 68 IN | WEIGHT: 212 LBS | TEMPERATURE: 99 F | BODY MASS INDEX: 32.13 KG/M2 | DIASTOLIC BLOOD PRESSURE: 85 MMHG

## 2020-07-01 LAB
ALBUMIN SERPL BCP-MCNC: 4.3 G/DL (ref 3.5–5.2)
ALP SERPL-CCNC: 71 U/L (ref 55–135)
ALT SERPL W/O P-5'-P-CCNC: 21 U/L (ref 10–44)
ANION GAP SERPL CALC-SCNC: 10 MMOL/L (ref 8–16)
AST SERPL-CCNC: 16 U/L (ref 10–40)
BILIRUB SERPL-MCNC: 0.4 MG/DL (ref 0.1–1)
BUN SERPL-MCNC: 20 MG/DL (ref 6–20)
CALCIUM SERPL-MCNC: 9.4 MG/DL (ref 8.7–10.5)
CHLORIDE SERPL-SCNC: 104 MMOL/L (ref 95–110)
CO2 SERPL-SCNC: 27 MMOL/L (ref 23–29)
CREAT SERPL-MCNC: 1.4 MG/DL (ref 0.5–1.4)
EST. GFR  (AFRICAN AMERICAN): >60 ML/MIN/1.73 M^2
EST. GFR  (NON AFRICAN AMERICAN): 57 ML/MIN/1.73 M^2
GLUCOSE SERPL-MCNC: 116 MG/DL (ref 70–110)
POTASSIUM SERPL-SCNC: 3.8 MMOL/L (ref 3.5–5.1)
PROT SERPL-MCNC: 7.8 G/DL (ref 6–8.4)
SODIUM SERPL-SCNC: 141 MMOL/L (ref 136–145)
TROPONIN I SERPL DL<=0.01 NG/ML-MCNC: <0.006 NG/ML (ref 0–0.03)
TROPONIN I SERPL DL<=0.01 NG/ML-MCNC: <0.006 NG/ML (ref 0–0.03)

## 2020-07-01 PROCEDURE — 25000003 PHARM REV CODE 250: Performed by: EMERGENCY MEDICINE

## 2020-07-01 PROCEDURE — 84484 ASSAY OF TROPONIN QUANT: CPT

## 2020-07-01 RX ORDER — MECLIZINE HYDROCHLORIDE 25 MG/1
25 TABLET ORAL
Status: COMPLETED | OUTPATIENT
Start: 2020-07-01 | End: 2020-07-01

## 2020-07-01 RX ORDER — MECLIZINE HCL 12.5 MG 12.5 MG/1
12.5 TABLET ORAL 3 TIMES DAILY PRN
Qty: 20 TABLET | Refills: 0 | Status: SHIPPED | OUTPATIENT
Start: 2020-07-01 | End: 2020-07-17 | Stop reason: SDUPTHER

## 2020-07-01 RX ADMIN — MECLIZINE HYDROCHLORIDE 25 MG: 25 TABLET ORAL at 02:07

## 2020-07-01 NOTE — ED PROVIDER NOTES
Encounter Date: 6/30/2020    SCRIBE #1 NOTE: I, Rj Guo, am scribing for, and in the presence of,  Nimisha Miller MD. I have scribed the following portions of the note - Other sections scribed: HPI, ROS, PE, MDM.       History     Chief Complaint   Patient presents with    Dizziness     Pt c/o dizziness and diaphoresis started about 1 hour ago he was laying down when the episode started he took zofran at home and is not working anymore. denies this event ever happening. denies syncope.      This is a 50 y.o. male with a PMHx of nephrolithiasis who presents to the Emergency Department complaining of dizziness with associated diaphoresis and nausea that started today. Patient reports complaint presented after getting up from laying down. Patient reports symptoms of intermittent flank pain on Saturday, headache, and unsteady gait. Patient denies chest pain, shortness of breath, abdominal pain, back pain, ear discomfort, diarrhea, dysuria, hematuria, numbness, or visual disturance. Patient denies onset of complaint when moving eyes or head from side to side. Patient reports prior history of similar symptoms secondary to nephrolithiasis. Patient reports prior attempt at treatment with Zofran with mild relief of nausea.      The history is provided by the patient.     Review of patient's allergies indicates:   Allergen Reactions    Pcn [penicillins] Rash     Past Medical History:   Diagnosis Date    Gout     Nephrolithiasis      Past Surgical History:   Procedure Laterality Date    HERNIA REPAIR      KIDNEY SURGERY      TONSILLECTOMY       Family History   Family history unknown: Yes     Social History     Tobacco Use    Smoking status: Never Smoker    Smokeless tobacco: Never Used   Substance Use Topics    Alcohol use: No    Drug use: No     Review of Systems   Constitutional: Positive for diaphoresis. Negative for chills, fatigue and fever.   HENT: Negative for congestion, ear pain, sinus pain and sore  throat.    Eyes: Negative for visual disturbance.   Respiratory: Negative for cough, shortness of breath, wheezing and stridor.    Cardiovascular: Negative for chest pain, palpitations and leg swelling.   Gastrointestinal: Positive for nausea. Negative for abdominal pain, diarrhea and vomiting.   Genitourinary: Positive for flank pain. Negative for dysuria, frequency and hematuria.   Musculoskeletal: Negative for back pain and joint swelling.   Neurological: Positive for dizziness and headaches. Negative for syncope, facial asymmetry, speech difficulty, weakness and numbness.        (+) Unsteady Gait   Psychiatric/Behavioral: Negative for confusion.       Physical Exam     Initial Vitals [06/30/20 2216]   BP Pulse Resp Temp SpO2   (!) 177/97 75 17 98.1 °F (36.7 °C) 97 %      MAP       --         Physical Exam    Nursing note and vitals reviewed.  Constitutional: He is not diaphoretic. No distress.   HENT:   Head: Normocephalic and atraumatic.   Mouth/Throat: Oropharynx is clear and moist.   Bilateral cerumen impaction left greater than right  Visualized areas of tympanic membrane without erythema   Eyes: Conjunctivae and EOM are normal. Pupils are equal, round, and reactive to light. No scleral icterus. Right eye exhibits no nystagmus. Left eye exhibits no nystagmus.   No nystagmus.   Neck: Normal range of motion. Neck supple. No JVD present.   Cardiovascular: Normal rate, regular rhythm and intact distal pulses.   Pulmonary/Chest: Breath sounds normal. No stridor. No respiratory distress.   Abdominal: Soft. Bowel sounds are normal. He exhibits no distension. There is no abdominal tenderness. There is no CVA tenderness.   No CVA tenderness.   Musculoskeletal: Normal range of motion. No tenderness or edema.   Neurological: He is alert. He has normal strength. No cranial nerve deficit or sensory deficit. GCS score is 15. GCS eye subscore is 4. GCS verbal subscore is 5. GCS motor subscore is 6.   Normal finger to nose    Skin: Skin is warm and dry. No rash noted.   Psychiatric: He has a normal mood and affect.         ED Course   Procedures  Labs Reviewed   CBC W/ AUTO DIFFERENTIAL - Abnormal; Notable for the following components:       Result Value    RBC 4.58 (*)     Hemoglobin 13.8 (*)     MPV 8.7 (*)     All other components within normal limits   COMPREHENSIVE METABOLIC PANEL - Abnormal; Notable for the following components:    Glucose 116 (*)     eGFR if non  57 (*)     All other components within normal limits   TROPONIN I   URINALYSIS, REFLEX TO URINE CULTURE    Narrative:     Preferred Collection Type->Urine, Clean Catch  Specimen Source->Urine   TROPONIN I     EKG Readings: (Independently Interpreted)   Initial Reading: No STEMI. Rhythm: Normal Sinus Rhythm. Heart Rate: 67. Ectopy: No Ectopy. Conduction: Normal. ST Segments: Normal ST Segments. T Waves: Normal.       Imaging Results          CT Head Without Contrast (Final result)  Result time 07/01/20 00:59:10    Final result by Natalio Caal MD (07/01/20 00:59:10)                 Impression:      No acute intracranial abnormalities identified.      Electronically signed by: Natalio Caal MD  Date:    07/01/2020  Time:    00:59             Narrative:    EXAMINATION:  CT HEAD WITHOUT CONTRAST    CLINICAL HISTORY:  Ataxia, stroke suspected;    TECHNIQUE:  Low dose axial images were obtained through the head.  Coronal and sagittal reformations were also performed. Contrast was not administered.    COMPARISON:  None.    FINDINGS:  No evidence of acute/recent major vascular distribution cerebral infarction, intraparenchymal hemorrhage, or intra-axial space occupying lesion. The ventricular system is normal in size and configuration with no evidence of hydrocephalus. No effacement of the skull-base cisterns. No abnormal extra-axial fluid collections or blood products. Visualized paranasal sinuses and mastoid air cells are clear. The calvarium shows no  significant abnormality.                               CT Renal Stone Study ABD Pelvis WO (Final result)  Result time 07/01/20 01:04:50    Final result by Natalio Caal MD (07/01/20 01:04:50)                 Impression:      1. Bilateral nonobstructing renal stones.  No evidence of ureteral stones or obstructive uropathy.  2. Otherwise no acute intra-abdominal abnormalities identified.  3. Additional findings as detailed above.      Electronically signed by: Natalio Caal MD  Date:    07/01/2020  Time:    01:04             Narrative:    EXAMINATION:  CT RENAL STONE STUDY ABD PELVIS WO    CLINICAL HISTORY:  Flank pain, kidney stone suspected;    TECHNIQUE:  Low dose axial images, sagittal and coronal reformations were obtained from the lung bases to the pubic symphysis.  Contrast was not administered.    COMPARISON:  CT abdomen and pelvis from January 2017.    FINDINGS:  The visualized portion of the heart is unremarkable.  The lung bases are clear.    No significant hepatic abnormality seen on this noncontrast exam.  There is no intra-or extrahepatic biliary ductal dilatation.  The gallbladder is unremarkable.  The stomach, pancreas, spleen, and adrenal glands are unremarkable.    Small bilateral nonobstructing renal stones are seen.  Few small renal hypodensities are seen which are incompletely characterized but may represent small cysts.  Ureters are unremarkable along their courses.  Urinary bladder is nondistended.  Prostate is unremarkable.    Appendix is visualized and is unremarkable.  The visualized loops of small and large bowel show no evidence of obstruction or inflammation.  Few small scattered colonic diverticula are seen.  No free air or free fluid.    Aorta tapers normally.    No acute osseous abnormality identified.  Fusion is seen of the T11, T12, and L1 vertebral bodies resulting in mild focal kyphosis at this level.  Subcutaneous soft tissue structures are unremarkable.                                  Medical Decision Making:   History:   Old Medical Records: I decided to obtain old medical records.  Differential Diagnosis:   Includes but not limited to: Orthostasis; Dehydration; Peripheral Vertigo; Central Vertigo; Medicine Side Effect; Renal Stone; ACS  Clinical Tests:   Lab Tests: Reviewed and Ordered  Radiological Study: Reviewed and Ordered  Medical Tests: Reviewed and Ordered  ED Management:  Patient is afebrile and in no acute distress time history and physical.  He has no obvious focal neurological deficits.  His cerebellar function appears intact.  He has a benign abdominal exam without CVA tenderness.  Labs within acceptable ranges without leukocytosis or significant electrolyte abnormality.  BNP and troponin are negative.  CT of the head is without acute intracranial abnormality.  CT of the abdomen pelvis notes bilateral intrarenal stones without evidence of obstructing stones or hydronephrosis.  There are renal cysts.  Patient's renal function is within acceptable ranges.  Patient given Zofran with some improvement in symptoms.  Patient given meclizine and on reassessment reports significant improvement in symptoms.  He is able to ambulate in the emergency department without ataxia or difficulty.  I have low clinical suspicion of ACS in this patient.  Symptoms not appear consistent with CVA.  Unclear etiology of patient's symptoms.  He on reassessment reports feeling well.  He is clinically stable and fit for discharge to follow up with his primary physician, ENT and Cardiology to further evaluate. counseled on supportive care, appropriate medication usage, concerning symptoms for which to return to ER and the importance of follow up. Understanding and agreement with treatment plan was expressed.   This chart was completed using dictation software, as a result there may be some transcription errors.             Scribe Attestation:   Scribe #1: I performed the above scribed service and the  documentation accurately describes the services I performed. I attest to the accuracy of the note.                          Clinical Impression:     1. Dizziness            Disposition:   Disposition: Discharged  Condition: Stable     ED Disposition Condition    Discharge Stable        ED Prescriptions     Medication Sig Dispense Start Date End Date Auth. Provider    meclizine (ANTIVERT) 12.5 mg tablet Take 1 tablet (12.5 mg total) by mouth 3 (three) times daily as needed for Dizziness. 20 tablet 7/1/2020  Nimisha Miller MD    carbamide peroxide (DEBROX) 6.5 % otic solution Place 5 drops into both ears as needed (Ear wax removal). 15 mL 7/1/2020  Nimisha Miller MD        Follow-up Information     Follow up With Specialties Details Why Contact Info    Negro Bell MD Family Medicine Schedule an appointment as soon as possible for a visit   [address] [facility] [address]  [phone]      Cash Guzman MD Otolaryngology Schedule an appointment as soon as possible for a visit   [address]  [address]  [place] [id number] [phone]      Himanshu Araya MD Cardiology, INTERVENTIONAL CARDIOLOGY Schedule an appointment as soon as possible for a visit   [address]  [address]  [place] [id number] [phone]                            I, Nimisha Miller , personally performed the services described in this documentation. All medical record entries made by the scribe were at my direction and in my presence. I have reviewed the chart and agree that the record reflects my personal performance and is accurate and complete.             Nimisha Miller MD  07/01/20 9639

## 2020-07-01 NOTE — PROVIDER PROGRESS NOTES - EMERGENCY DEPT.
Emergency Department TeleTRIAGE Encounter Note      CHIEF COMPLAINT    Chief Complaint   Patient presents with    Dizziness     Pt c/o dizziness and diaphoresis started about 1 hour ago he was laying down when the episode started he took zofran at home and is not working anymore. denies this event ever happening. denies syncope.        VITAL SIGNS   Initial Vitals [06/30/20 2216]   BP Pulse Resp Temp SpO2   (!) 177/97 75 17 98.1 °F (36.7 °C) 97 %      MAP       --            ALLERGIES    Review of patient's allergies indicates:   Allergen Reactions    Pcn [penicillins] Rash       PROVIDER TRIAGE NOTE  Pt is a 49 yo male presenting for nausea, dizziness and diaphoresis approximately 30 min prior to coming to the ED. Pt states his wife gave him a Zofran which resolved his symptoms.  Pt states last time he had similar symptoms he had a kidney stone.        ORDERS  Labs Reviewed - No data to display    ED Orders (720h ago, onward)    Start Ordered     Status Ordering Provider    07/01/20 0000 06/30/20 2222  Vital Signs  Every 2 hours      Ordered FELICITA POLLACK    06/30/20 2222 06/30/20 2222  CBC auto differential  STAT      Ordered FELICITA POLLACK    06/30/20 2222 06/30/20 2222  Comprehensive metabolic panel  STAT      Ordered FELICITA POLLACK    06/30/20 2222 06/30/20 2222  Troponin I  STAT      Ordered FELICITA POLLACK.    06/30/20 2222 06/30/20 2222  Insert Saline lock IV  Once      Ordered FELICITA POLLACK    06/30/20 2222 06/30/20 2222  EKG 12-lead  Once      Ordered FELICITA POLLACK    06/30/20 2222 06/30/20 2222  Cardiac Monitoring - Adult  Continuous     Comments: Notify Physician If:    Ordered FELICITA POLLACK    06/30/20 2222 06/30/20 2222  Urinalysis, Reflex to Urine Culture Urine, Clean Catch  STAT      Ordered FELICITA POLLACK            Virtual Visit Note: The provider triage portion of this emergency department evaluation and documentation was performed via [employer], a HIPAA-compliant  telemedicine application, in concert with a tele-presenter in the room. A face to face patient evaluation with one of my colleagues will occur once the patient is placed in an emergency department room.      DISCLAIMER: This note was prepared with [employer] voice recognition transcription software. Garbled syntax, mangled pronouns, and other bizarre constructions may be attributed to that software system.

## 2020-07-01 NOTE — DISCHARGE INSTRUCTIONS
Exact cause of your episode of dizziness is unclear at this time.  Emergency department testing does not suggest an acute stroke, acute heart attack, kidney failure or significant electrolyte abnormality.  Your symptoms may be related to vertigo.  Use the prescribed meclizine as needed for dizziness.  Do not drive or operate heavy machinery while taking meclizine as it can cause drowsiness and decrease coordination.  Use the prescribed Debrox drops to clear ear wax from your ears.  Schedule follow-up with your primary physician, cardiologist and ENT to monitor and further evaluate your symptoms.  Return to the emergency department for fever, chest pain, breathing difficulty, new numbness, new weakness, difficulty speaking, difficulty walking or any new, worsening or significantly concerning symptoms.

## 2020-07-02 ENCOUNTER — PES CALL (OUTPATIENT)
Dept: ADMINISTRATIVE | Facility: CLINIC | Age: 50
End: 2020-07-02

## 2020-07-16 ENCOUNTER — OFFICE VISIT (OUTPATIENT)
Dept: FAMILY MEDICINE | Facility: CLINIC | Age: 50
End: 2020-07-16
Payer: COMMERCIAL

## 2020-07-16 ENCOUNTER — TELEPHONE (OUTPATIENT)
Dept: FAMILY MEDICINE | Facility: CLINIC | Age: 50
End: 2020-07-16

## 2020-07-16 VITALS
TEMPERATURE: 99 F | DIASTOLIC BLOOD PRESSURE: 80 MMHG | HEART RATE: 76 BPM | OXYGEN SATURATION: 99 % | BODY MASS INDEX: 32.05 KG/M2 | WEIGHT: 211.44 LBS | HEIGHT: 68 IN | SYSTOLIC BLOOD PRESSURE: 134 MMHG

## 2020-07-16 DIAGNOSIS — J01.90 ACUTE SINUSITIS, RECURRENCE NOT SPECIFIED, UNSPECIFIED LOCATION: Primary | ICD-10-CM

## 2020-07-16 DIAGNOSIS — R51.9 SINUS HEADACHE: ICD-10-CM

## 2020-07-16 PROCEDURE — 3075F SYST BP GE 130 - 139MM HG: CPT | Mod: CPTII,S$GLB,, | Performed by: PHYSICIAN ASSISTANT

## 2020-07-16 PROCEDURE — 99213 OFFICE O/P EST LOW 20 MIN: CPT | Mod: S$GLB,,, | Performed by: PHYSICIAN ASSISTANT

## 2020-07-16 PROCEDURE — 99213 PR OFFICE/OUTPT VISIT, EST, LEVL III, 20-29 MIN: ICD-10-PCS | Mod: S$GLB,,, | Performed by: PHYSICIAN ASSISTANT

## 2020-07-16 PROCEDURE — 3008F BODY MASS INDEX DOCD: CPT | Mod: CPTII,S$GLB,, | Performed by: PHYSICIAN ASSISTANT

## 2020-07-16 PROCEDURE — 99999 PR PBB SHADOW E&M-EST. PATIENT-LVL IV: CPT | Mod: PBBFAC,,, | Performed by: PHYSICIAN ASSISTANT

## 2020-07-16 PROCEDURE — 3079F DIAST BP 80-89 MM HG: CPT | Mod: CPTII,S$GLB,, | Performed by: PHYSICIAN ASSISTANT

## 2020-07-16 PROCEDURE — 3008F PR BODY MASS INDEX (BMI) DOCUMENTED: ICD-10-PCS | Mod: CPTII,S$GLB,, | Performed by: PHYSICIAN ASSISTANT

## 2020-07-16 PROCEDURE — 3075F PR MOST RECENT SYSTOLIC BLOOD PRESS GE 130-139MM HG: ICD-10-PCS | Mod: CPTII,S$GLB,, | Performed by: PHYSICIAN ASSISTANT

## 2020-07-16 PROCEDURE — 3079F PR MOST RECENT DIASTOLIC BLOOD PRESSURE 80-89 MM HG: ICD-10-PCS | Mod: CPTII,S$GLB,, | Performed by: PHYSICIAN ASSISTANT

## 2020-07-16 PROCEDURE — 99999 PR PBB SHADOW E&M-EST. PATIENT-LVL IV: ICD-10-PCS | Mod: PBBFAC,,, | Performed by: PHYSICIAN ASSISTANT

## 2020-07-16 RX ORDER — LEVOCETIRIZINE DIHYDROCHLORIDE 5 MG/1
5 TABLET, FILM COATED ORAL NIGHTLY
Qty: 30 TABLET | Refills: 11 | Status: SHIPPED | OUTPATIENT
Start: 2020-07-16 | End: 2021-02-01

## 2020-07-16 RX ORDER — MOMETASONE FUROATE 50 UG/1
2 SPRAY, METERED NASAL DAILY
Qty: 17 G | Refills: 11 | Status: SHIPPED | OUTPATIENT
Start: 2020-07-16 | End: 2021-02-01

## 2020-07-16 NOTE — TELEPHONE ENCOUNTER
----- Message from Kat Maher sent at 7/16/2020  4:28 PM CDT -----  Contact: Karuna Lamar (Spouse)  Type: Patient Call Back    Who called: Karuna Lamar (Spouse)    What is the request in detail: Karuna Lamar (Spouse) is requesting a call back. He states that she would like a medication to be sent to the pharmacy for nausea.     [facility] - [place] - [address]  [address]  [place] [address]  Phone: [phone] Fax: [phone]      Please advise.    Can the clinic reply by [facility]? No    Best call back number: [phone]    Additional Information: N/A

## 2020-07-16 NOTE — PROGRESS NOTES
Subjective:       Patient ID: Tommy Lamar Sr. is a 50 y.o. male with multiple medical diagnoses as listed in the medical history and problem list that presents for Dizziness  .    Chief Complaint: Dizziness    Following up from ER.     Dizziness:   Chronicity:  New  Onset:  1 to 4 weeks ago (6/30)  Progression since onset:  Resolved  Frequency - weeks/days included: happened twice in a row   Duration: brief,rest resolved then happened again   Dizziness characteristics:  Off-balance and spinning inside head only   Associated symptoms: ear congestion and diaphoresis.no ear pain, no fever, no headaches, no tinnitus, no nausea, no vomiting, no weakness, no visual disturbances, no light-headedness, no syncope, no palpitations, no panic and no numbness in extremities.  Aggravated by:  Bending and position changes  Treatments tried:  Rest  Improvements on treatment:  Resolved     Pt states he was also seen at ENT prior to this. Stated he was very congested so given medrol dose pack but did not take it because he got a shot and had completed dose pack recently due to gout. He was taking allegra D but stopped. He works outside daily.     Since taking the steroid pack from ENT after the ER no issues      Review of Systems   Constitutional: Positive for diaphoresis. Negative for fever.   HENT: Negative for ear pain and tinnitus.    Cardiovascular: Negative for palpitations and syncope.   Gastrointestinal: Negative for nausea and vomiting.   Neurological: Positive for dizziness. Negative for weakness, light-headedness and headaches.         PAST MEDICAL HISTORY:  Past Medical History:   Diagnosis Date    Gout     Nephrolithiasis        SOCIAL HISTORY:  Social History     Socioeconomic History    Marital status:      Spouse name: Not on file    Number of children: Not on file    Years of education: Not on file    Highest education level: Not on file   Occupational History    Not on file   Social Needs     Financial resource strain: Not on file    Food insecurity     Worry: Not on file     Inability: Not on file    Transportation needs     Medical: Not on file     Non-medical: Not on file   Tobacco Use    Smoking status: Never Smoker    Smokeless tobacco: Never Used   Substance and Sexual Activity    Alcohol use: No    Drug use: No    Sexual activity: Yes     Partners: Female     Birth control/protection: None   Lifestyle    Physical activity     Days per week: Not on file     Minutes per session: Not on file    Stress: Not at all   Relationships    Social connections     Talks on phone: Not on file     Gets together: Not on file     Attends Scientologist service: Not on file     Active member of club or organization: Not on file     Attends meetings of clubs or organizations: Not on file     Relationship status: Not on file   Other Topics Concern    Not on file   Social History Narrative    Not on file       ALLERGIES AND MEDICATIONS: updated and reviewed.  Review of patient's allergies indicates:   Allergen Reactions    Pcn [penicillins] Rash     Current Outpatient Medications   Medication Sig Dispense Refill    allopurinol (ZYLOPRIM) 50 MG Tab tablet Take 100 mg by mouth.      aspirin (ECOTRIN) 81 MG EC tablet Take 1 tablet (81 mg total) by mouth once daily. 90 tablet 3    carbamide peroxide (DEBROX) 6.5 % otic solution Place 5 drops into both ears as needed (Ear wax removal). 15 mL 0    colchicine (COLCRYS) 0.6 mg tablet TAKE 2 TABLETS BY MOUTH AT GOUT FLARE UP & ONE TABLET ONE HOUR LATER REPEAT IN 24 HOURS IF NEEDED 30 tablet 0    indomethacin (INDOCIN) 50 MG capsule Take 1 capsule (50 mg total) by mouth 2 (two) times daily with meals. 60 capsule 1    meclizine (ANTIVERT) 12.5 mg tablet Take 1 tablet (12.5 mg total) by mouth 3 (three) times daily as needed for Dizziness. 20 tablet 0    metoprolol tartrate (LOPRESSOR) 25 MG tablet TAKE 1/2 TABLET BY MOUTH EVERY EVENING 45 tablet 0    mometasone  "(NASONEX) 50 mcg/actuation nasal spray 2 sprays by Nasal route once daily. 17 g 11    omeprazole (PRILOSEC) 40 MG capsule TAKE ONE CAPSULE BY MOUTH DAILY *DO NOT CHEW/CRUSH/GRIND* 90 capsule 3    levocetirizine (XYZAL) 5 MG tablet Take 1 tablet (5 mg total) by mouth every evening. 30 tablet 11     No current facility-administered medications for this visit.          Objective:   /80   Pulse 76   Temp 99.4 °F (37.4 °C) (Oral)   Ht 5' 8" (1.727 m)   Wt 95.9 kg (211 lb 6.7 oz)   SpO2 99%   BMI 32.15 kg/m²      Physical Exam  Constitutional:       Appearance: He is obese.   HENT:      Head: Normocephalic and atraumatic.      Right Ear: Tympanic membrane and ear canal normal. Tympanic membrane is not injected.      Left Ear: Tympanic membrane and ear canal normal. Tympanic membrane is not injected.      Ears:      Comments: Very few air fluid levels      Nose: Mucosal edema and congestion (right) present. No rhinorrhea.      Mouth/Throat:      Mouth: Mucous membranes are moist.      Pharynx: Oropharynx is clear.   Cardiovascular:      Rate and Rhythm: Normal rate.   Pulmonary:      Effort: Pulmonary effort is normal.   Neurological:      Mental Status: He is alert.             Assessment:       1. Acute sinusitis, recurrence not specified, unspecified location    2. Sinus headache        Plan:       Acute sinusitis, recurrence not specified, unspecified location  -     levocetirizine (XYZAL) 5 MG tablet; Take 1 tablet (5 mg total) by mouth every evening.  Dispense: 30 tablet; Refill: 11  -     mometasone (NASONEX) 50 mcg/actuation nasal spray; 2 sprays by Nasal route once daily.  Dispense: 17 g; Refill: 11    Sinus headache  -     mometasone (NASONEX) 50 mcg/actuation nasal spray; 2 sprays by Nasal route once daily.  Dispense: 17 g; Refill: 11    aware to use and take daily   Stop if too dry or nose bleed          No follow-ups on file.    "

## 2020-07-17 RX ORDER — MECLIZINE HCL 12.5 MG 12.5 MG/1
12.5 TABLET ORAL 3 TIMES DAILY PRN
Qty: 20 TABLET | Refills: 0 | Status: SHIPPED | OUTPATIENT
Start: 2020-07-17 | End: 2021-02-01

## 2020-07-17 RX ORDER — MECLIZINE HCL 12.5 MG 12.5 MG/1
12.5 TABLET ORAL 3 TIMES DAILY PRN
Qty: 20 TABLET | Refills: 0 | Status: SHIPPED | OUTPATIENT
Start: 2020-07-17 | End: 2020-07-17 | Stop reason: SDUPTHER

## 2020-07-22 ENCOUNTER — HOSPITAL ENCOUNTER (EMERGENCY)
Facility: HOSPITAL | Age: 50
Discharge: HOME | End: 2020-07-22
Attending: EMERGENCY MEDICINE
Payer: COMMERCIAL

## 2020-07-22 VITALS
HEIGHT: 68 IN | BODY MASS INDEX: 31.83 KG/M2 | HEART RATE: 90 BPM | OXYGEN SATURATION: 98 % | RESPIRATION RATE: 16 BRPM | DIASTOLIC BLOOD PRESSURE: 82 MMHG | WEIGHT: 210 LBS | SYSTOLIC BLOOD PRESSURE: 142 MMHG | TEMPERATURE: 98 F

## 2020-07-22 DIAGNOSIS — S82.841A CLOSED BIMALLEOLAR FRACTURE OF RIGHT ANKLE, INITIAL ENCOUNTER: Primary | ICD-10-CM

## 2020-07-22 DIAGNOSIS — R52 PAIN: ICD-10-CM

## 2020-07-22 PROCEDURE — 99283 EMERGENCY DEPT VISIT LOW MDM: CPT | Mod: 25

## 2020-07-22 PROCEDURE — 29515 APPLICATION SHORT LEG SPLINT: CPT | Mod: RT

## 2020-07-22 PROCEDURE — 25000003 PHARM REV CODE 250: Performed by: EMERGENCY MEDICINE

## 2020-07-22 RX ORDER — OXYCODONE AND ACETAMINOPHEN 5; 325 MG/1; MG/1
1 TABLET ORAL EVERY 4 HOURS PRN
Qty: 18 TABLET | Refills: 0 | Status: SHIPPED | OUTPATIENT
Start: 2020-07-22 | End: 2021-02-01

## 2020-07-22 RX ORDER — MUPIROCIN 20 MG/G
1 OINTMENT TOPICAL
Status: COMPLETED | OUTPATIENT
Start: 2020-07-22 | End: 2020-07-22

## 2020-07-22 RX ORDER — OXYCODONE AND ACETAMINOPHEN 5; 325 MG/1; MG/1
2 TABLET ORAL
Status: COMPLETED | OUTPATIENT
Start: 2020-07-22 | End: 2020-07-22

## 2020-07-22 RX ADMIN — MUPIROCIN 22 G: 20 OINTMENT TOPICAL at 08:07

## 2020-07-22 RX ADMIN — OXYCODONE AND ACETAMINOPHEN 2 TABLET: 5; 325 TABLET ORAL at 08:07

## 2020-07-22 NOTE — ED NOTES
Discharge instructions, diagnosis, medications, and follow up discussed with patient. Patient verbalized understanding. All questions and concerns answered. No needs expressed at the time. Pt is awake, alert and oriented with no acute distress noted. Respirations even and unlabored. Wheelchair out of ED.

## 2020-07-22 NOTE — ED PROVIDER NOTES
Encounter Date: 7/22/2020       History     Chief Complaint   Patient presents with    Ankle Pain     right     50-year-old male fell from the ladder and foot got caught in the ladder as he was falling.  Patient has abrasions on the legs.  Patient complains of right ankle pain.  Patient has tenderness and swelling of the ankle diffusely.  Patient denies fever or chills or nausea vomiting.  Denies chest pain or shortness of breath.  Denies any other injuries.        Review of patient's allergies indicates:   Allergen Reactions    Pcn [penicillins] Rash     Past Medical History:   Diagnosis Date    Gout     Nephrolithiasis      Past Surgical History:   Procedure Laterality Date    HERNIA REPAIR      KIDNEY SURGERY      TONSILLECTOMY       Family History   Family history unknown: Yes     Social History     Tobacco Use    Smoking status: Never Smoker    Smokeless tobacco: Never Used   Substance Use Topics    Alcohol use: No    Drug use: No     Review of Systems   Constitutional: Negative.    HENT: Negative.    Eyes: Negative.    Respiratory: Negative.    Cardiovascular: Negative.    Gastrointestinal: Negative.    Endocrine: Negative.    Genitourinary: Negative.    Musculoskeletal: Positive for joint swelling.   Skin: Positive for wound.        Abrasions on the legs   Allergic/Immunologic: Negative.    Neurological: Negative.    Hematological: Negative.    Psychiatric/Behavioral: Negative.    All other systems reviewed and are negative.      Physical Exam     Initial Vitals [07/22/20 0800]   BP Pulse Resp Temp SpO2   (!) 150/85 91 20 98.4 °F (36.9 °C) 99 %      MAP       --         Physical Exam    Nursing note and vitals reviewed.  Constitutional: He appears well-developed and well-nourished.   HENT:   Head: Normocephalic and atraumatic.   Nose: Nose normal.   Eyes: Conjunctivae and EOM are normal.   Neck: Normal range of motion. No tracheal deviation present.   Cardiovascular: Normal rate, regular rhythm,  normal heart sounds and intact distal pulses. Exam reveals no friction rub.    No murmur heard.  Pulmonary/Chest: Breath sounds normal. No respiratory distress. He has no wheezes. He has no rales.   Abdominal: Soft. He exhibits no distension. There is no abdominal tenderness. There is no rebound.   Musculoskeletal: Normal range of motion. Tenderness present.      Comments: Tenderness and swelling of the right ankle.  By malleolar tenderness and swelling.  Intact distal pulses and cap refill less than 2 sec. Abrasion proximal to the ankle joint.   Neurological: He is alert and oriented to person, place, and time. He has normal strength. No sensory deficit. GCS score is 15. GCS eye subscore is 4. GCS verbal subscore is 5. GCS motor subscore is 6.   Skin: Skin is warm and dry. Capillary refill takes less than 2 seconds.   Abrasions on both legs.  Abrasion on the left thigh and right leg   Psychiatric: He has a normal mood and affect. Thought content normal.         ED Course   Splint Application    Date/Time: 7/22/2020 8:48 AM  Performed by: Yoni Jade MD  Authorized by: Yoni Jade MD   Consent Done: Not Needed  Location details: right leg  Splint type: ankle stirrup  Post-procedure: The splinted body part was neurovascularly unchanged following the procedure.  Comments: Right lower extremity posterior splint with ankle stirrup placed by technician and I examined after splint placement and extremities neurovascularly intact        Labs Reviewed - No data to display       Imaging Results          X-Ray Ankle Complete Right (Final result)  Result time 07/22/20 08:26:12    Final result by Mt Gupta MD (07/22/20 08:26:12)                 Narrative:    XR ANKLE COMPLETE 3 VIEW RIGHT    CLINICAL HISTORY:  50 years Male pain after fall    COMPARISON: None    FINDINGS: Acute oblique fracture of distal fibula at the level of the  lateral malleolus, essentially nondisplaced. Acute longitudinal  fracture of medial  malleolus, also essentially nondisplaced. Posterior  malleolus appears intact on the lateral view. Ankle joint effusion.  Soft tissue swelling about the ankle. No soft tissue gas or radiopaque  foreign body.    IMPRESSION:    Acute nondisplaced fractures of distal fibula and medial malleolus.    Electronically Signed by Mt CASTILLO on 7/22/2020 8:30 AM                               Medical Decision Making:   Differential Diagnosis:   50-year-old male with right ankle injury with by malleolar fracture noted on x-ray.  X-rays sent to Dr. Giron who agreed to see the patient in clinic and further manage.  Splint placed and ankle immobilized.  Crutches given.  Pain control.  Discharged with follow-up with Orthopedics  Clinical Tests:   Radiological Study: Reviewed         ankle x-ray shows evidence of by malleolar fracture.  Extremities neurovascularly intact.  Splint placed.  Pain managed.  Blood with instructions and follow-up with Orthopedics.  Case discussed with Dr. Giron who agreed to see the patient and Clinic                         Clinical Impression:       ICD-10-CM ICD-9-CM   1. Closed bimalleolar fracture of right ankle, initial encounter  S82.841A 824.4   2. Pain  R52 780.96                                Yoni Jade MD  07/22/20 0852

## 2020-07-22 NOTE — ED NOTES
LOC: The patient is awake, alert and aware of environment with an appropriate affect, the patient is oriented x 3 and speaking appropriately.  APPEARANCE: Patient resting comfortably and in no acute distress, patient is clean and well groomed, patient's clothing properly fastened.  SKIN: The skin is warm and dry, color consistent with ethnicity, patient has normal skin turgor and moist mucus membranes, skin intact, no breakdown or brusing noted.  MUSKULOSKELETAL: Patient moving all extremities well with exception to R ankle, swelling noted to R ankle. Patient reports someone put R ankle back in place after fall.   RESPIRATORY: Airway is open and patent, respirations are spontaneous, patient has a normal effort and rate, no accessory muscle use noted.  CARDIAC: Patient has a normal rate and rhythm, no peripheral edema noted, capillary refill < 3 seconds.  ABDOMEN: Soft and non tender to palpation, no distention noted.  NEUROLOGIC: PERRL, 3mm bilaterally, eyes open spontaneously, behavior appropriate to situation, follows commands, facial expression symmetrical, bilateral hand grasp equal and even, purposeful motor response noted, normal sensation in all extremities when touched with a finger.

## 2020-07-23 ENCOUNTER — OFFICE VISIT (OUTPATIENT)
Dept: ORTHOPEDICS | Facility: CLINIC | Age: 50
End: 2020-07-23
Payer: COMMERCIAL

## 2020-07-23 VITALS
OXYGEN SATURATION: 99 % | SYSTOLIC BLOOD PRESSURE: 160 MMHG | WEIGHT: 210 LBS | HEART RATE: 79 BPM | RESPIRATION RATE: 18 BRPM | HEIGHT: 68 IN | DIASTOLIC BLOOD PRESSURE: 82 MMHG | BODY MASS INDEX: 31.83 KG/M2

## 2020-07-23 DIAGNOSIS — S82.841A BIMALLEOLAR ANKLE FRACTURE, RIGHT, CLOSED, INITIAL ENCOUNTER: Primary | ICD-10-CM

## 2020-07-23 DIAGNOSIS — S82.891A ANKLE FRACTURE, RIGHT: ICD-10-CM

## 2020-07-23 DIAGNOSIS — S82.841A CLOSED BIMALLEOLAR FRACTURE OF RIGHT ANKLE, INITIAL ENCOUNTER: Primary | ICD-10-CM

## 2020-07-23 DIAGNOSIS — S82.891B TYPE I OR II OPEN FRACTURE OF RIGHT ANKLE, INITIAL ENCOUNTER: ICD-10-CM

## 2020-07-23 PROCEDURE — 99204 PR OFFICE/OUTPT VISIT, NEW, LEVL IV, 45-59 MIN: ICD-10-PCS | Mod: S$GLB,,, | Performed by: PHYSICIAN ASSISTANT

## 2020-07-23 PROCEDURE — 99999 PR PBB SHADOW E&M-EST. PATIENT-LVL IV: CPT | Mod: PBBFAC,,, | Performed by: PHYSICIAN ASSISTANT

## 2020-07-23 PROCEDURE — 3008F BODY MASS INDEX DOCD: CPT | Mod: CPTII,S$GLB,, | Performed by: PHYSICIAN ASSISTANT

## 2020-07-23 PROCEDURE — 3008F PR BODY MASS INDEX (BMI) DOCUMENTED: ICD-10-PCS | Mod: CPTII,S$GLB,, | Performed by: PHYSICIAN ASSISTANT

## 2020-07-23 PROCEDURE — 3077F PR MOST RECENT SYSTOLIC BLOOD PRESSURE >= 140 MM HG: ICD-10-PCS | Mod: CPTII,S$GLB,, | Performed by: PHYSICIAN ASSISTANT

## 2020-07-23 PROCEDURE — 99999 PR PBB SHADOW E&M-EST. PATIENT-LVL IV: ICD-10-PCS | Mod: PBBFAC,,, | Performed by: PHYSICIAN ASSISTANT

## 2020-07-23 PROCEDURE — 3077F SYST BP >= 140 MM HG: CPT | Mod: CPTII,S$GLB,, | Performed by: PHYSICIAN ASSISTANT

## 2020-07-23 PROCEDURE — 3079F DIAST BP 80-89 MM HG: CPT | Mod: CPTII,S$GLB,, | Performed by: PHYSICIAN ASSISTANT

## 2020-07-23 PROCEDURE — 3079F PR MOST RECENT DIASTOLIC BLOOD PRESSURE 80-89 MM HG: ICD-10-PCS | Mod: CPTII,S$GLB,, | Performed by: PHYSICIAN ASSISTANT

## 2020-07-23 PROCEDURE — 99204 OFFICE O/P NEW MOD 45 MIN: CPT | Mod: S$GLB,,, | Performed by: PHYSICIAN ASSISTANT

## 2020-07-23 RX ORDER — ONDANSETRON 8 MG/1
8 TABLET, ORALLY DISINTEGRATING ORAL EVERY 6 HOURS PRN
Qty: 20 TABLET | Refills: 0 | Status: ON HOLD | OUTPATIENT
Start: 2020-07-23 | End: 2020-07-29 | Stop reason: SDUPTHER

## 2020-07-23 NOTE — PROGRESS NOTES
SUBJECTIVE:     Chief Complaint & History of Present Illness:    Tommy Lamar Sr. is a 50 y.o. year old male, who works in [employer], here today with complaints of right ankle injury.  He states that his injury occurred when he was climbing a ladder and his foot was caught as he fell.  He was seen in the ED yesterday and diagnosed with bimalleolar ankle fracture.  He was splinted and referred for further evaluation.  His current pain is 8/10.  He complains of diffuse ankle pain and swelling.  He is not able to bear weight.  He states that he has been elevating his ankle.  There is no history of prior surgery to the ankle. He is taking percocet as prescribed by the ED for pain.  He does complain of some nausea. He is here in a wheelchair today.      Review of patient's allergies indicates:   Allergen Reactions    Pcn [penicillins] Rash         Current Outpatient Medications   Medication Sig Dispense Refill    aspirin (ECOTRIN) 81 MG EC tablet Take 1 tablet (81 mg total) by mouth once daily. 90 tablet 3    colchicine (COLCRYS) 0.6 mg tablet TAKE 2 TABLETS BY MOUTH AT GOUT FLARE UP & ONE TABLET ONE HOUR LATER REPEAT IN 24 HOURS IF NEEDED 30 tablet 0    levocetirizine (XYZAL) 5 MG tablet Take 1 tablet (5 mg total) by mouth every evening. 30 tablet 11    metoprolol tartrate (LOPRESSOR) 25 MG tablet TAKE 1/2 TABLET BY MOUTH EVERY EVENING 45 tablet 0    omeprazole (PRILOSEC) 40 MG capsule TAKE ONE CAPSULE BY MOUTH DAILY *DO NOT CHEW/CRUSH/GRIND* 90 capsule 3    oxyCODONE-acetaminophen (PERCOCET) 5-325 mg per tablet Take 1 tablet by mouth every 4 (four) hours as needed for Pain. 18 tablet 0    allopurinol (ZYLOPRIM) 50 MG Tab tablet Take 100 mg by mouth.      carbamide peroxide (DEBROX) 6.5 % otic solution Place 5 drops into both ears as needed (Ear wax removal). 15 mL 0    indomethacin (INDOCIN) 50 MG capsule Take 1 capsule (50 mg total) by mouth 2 (two) times daily with meals. (Patient not taking: Reported  "on 7/23/2020) 60 capsule 1    meclizine (ANTIVERT) 12.5 mg tablet Take 1 tablet (12.5 mg total) by mouth 3 (three) times daily as needed for Dizziness. (Patient not taking: Reported on 7/23/2020) 20 tablet 0    mometasone (NASONEX) 50 mcg/actuation nasal spray 2 sprays by Nasal route once daily. (Patient not taking: Reported on 7/23/2020) 17 g 11    mupirocin calcium 2% nasl oint (BACTROBAN) 2 % Oint by Nasal route 2 (two) times daily. for 5 days (Patient not taking: Reported on 7/23/2020) 10 g 0    ondansetron (ZOFRAN-ODT) 8 MG TbDL Take 1 tablet (8 mg total) by mouth every 6 (six) hours as needed. 20 tablet 0     No current facility-administered medications for this visit.        Past Medical History:   Diagnosis Date    Gout     Nephrolithiasis        Past Surgical History:   Procedure Laterality Date    HERNIA REPAIR      KIDNEY SURGERY      TONSILLECTOMY         Vital Signs (Most Recent)  Vitals:    07/23/20 0910   BP: (!) 160/82   Pulse: 79   Resp: 18       Review of Systems:  ROS:  Constitutional: no fever or chills  Eyes: no visual changes  ENT: no nasal congestion or sore throat  Respiratory: no cough or shortness of breath  Cardiovascular: no chest pain or palpitations  Gastrointestinal: no nausea or vomiting, tolerating diet  Genitourinary: no hematuria or dysuria  Integument/Breast: no rash or pruritis  Hematologic/Lymphatic: no easy bruising or lymphadenopathy  Musculoskeletal: no arthralgias or myalgias  Neurological: no seizures or tremors  Behavioral/Psych: no auditory or visual hallucinations  Endocrine: no heat or cold intolerance      OBJECTIVE:     PHYSICAL EXAM:  Height: 5' 8" (172.7 cm) Weight: 95.3 kg (210 lb), General Appearance: Well nourished, well developed, in no acute distress.  CV: 2+ UE and LE distal pulses bilaterally  RESP: Respirations equal and unlabored  GI: Abdomen soft and non-tender  Neurological: Mood & affect are normal.    Right ankle  There is a superficial " abrasion along the anterior distal tibia. No erythema or evidence of infection. No abrasion or open wound medially or laterally  Moderate swelling  No deformity  TTP medial and lateral malleolus  Sensation to light touch intact  2+ DP    IMAGING:  X-rays of the right ankle, personally reviewed by me, demonstrate minimally displaced bimalleolar ankle fracture.     ASSESSMENT/PLAN:     50 year old male with closed bimalleolar fracture, right ankle    Plan:  -  Patient was seen and evaluated with Dr. Workman. The recommendation is ORIF right bimalleolar ankle fracture.  Options and plan was discussed with the patient.  Will plan for surgery next week.  - Surgical consents were signed and preoperative instructions were given.  - No need for pain medication prescription today.  He was given prescription zofran for nausea.   - New splint was applied today in clinic  - He should remain NWB.  He was instructed on strict elevation of the ankle  - Follow up post-operatively

## 2020-07-24 ENCOUNTER — PATIENT OUTREACH (OUTPATIENT)
Dept: ADMINISTRATIVE | Facility: OTHER | Age: 50
End: 2020-07-24

## 2020-07-24 ENCOUNTER — TELEPHONE (OUTPATIENT)
Dept: ORTHOPEDICS | Facility: CLINIC | Age: 50
End: 2020-07-24

## 2020-07-24 ENCOUNTER — OFFICE VISIT (OUTPATIENT)
Dept: ORTHOPEDICS | Facility: CLINIC | Age: 50
End: 2020-07-24
Payer: COMMERCIAL

## 2020-07-24 ENCOUNTER — TELEPHONE (OUTPATIENT)
Dept: FAMILY MEDICINE | Facility: CLINIC | Age: 50
End: 2020-07-24

## 2020-07-24 VITALS — HEIGHT: 68 IN | WEIGHT: 210 LBS | BODY MASS INDEX: 31.83 KG/M2

## 2020-07-24 DIAGNOSIS — S82.841A CLOSED BIMALLEOLAR FRACTURE OF RIGHT ANKLE, INITIAL ENCOUNTER: Primary | ICD-10-CM

## 2020-07-24 PROCEDURE — 99499 UNLISTED E&M SERVICE: CPT | Mod: S$GLB,,, | Performed by: PHYSICIAN ASSISTANT

## 2020-07-24 PROCEDURE — 99499 NO LOS: ICD-10-PCS | Mod: S$GLB,,, | Performed by: PHYSICIAN ASSISTANT

## 2020-07-24 PROCEDURE — 99999 PR PBB SHADOW E&M-EST. PATIENT-LVL III: CPT | Mod: PBBFAC,,, | Performed by: PHYSICIAN ASSISTANT

## 2020-07-24 PROCEDURE — 99999 PR PBB SHADOW E&M-EST. PATIENT-LVL III: ICD-10-PCS | Mod: PBBFAC,,, | Performed by: PHYSICIAN ASSISTANT

## 2020-07-24 RX ORDER — OXYCODONE AND ACETAMINOPHEN 10; 325 MG/1; MG/1
1 TABLET ORAL EVERY 6 HOURS PRN
Qty: 20 TABLET | Refills: 0 | Status: ON HOLD | OUTPATIENT
Start: 2020-07-24 | End: 2020-07-29 | Stop reason: SDUPTHER

## 2020-07-24 NOTE — PROGRESS NOTES
Requested updates within Care Everywhere.  Patient's chart was reviewed for overdue TESSY topics.  Immunizations reconciled.    Orders placed:n/a  Labs Linked:n/a

## 2020-07-24 NOTE — H&P (VIEW-ONLY)
Patient is here today for splint change.  He reports that the new splint applied yesterday feels too tight and his pain is worse.    Splint removed, skin intact, no breakdown.  Moderate ankle swelling and ecchymosis noted.  Follow up postoperatively

## 2020-07-24 NOTE — TELEPHONE ENCOUNTER
Attempt to contact patient in regards to message. Left message for patient to return call back to [phone]. Thanks.

## 2020-07-24 NOTE — TELEPHONE ENCOUNTER
----- Message from Melinda Silverio MA sent at 7/24/2020 12:57 PM CDT -----  Regarding: clearance for surgery  Patient is schedule for surgery 7/29/20 w/ Dr. Workman for right ankle fracture he was recently seen on 7/1/20 by dr. Bell  can this patient be ok to cleared for surgery since patient was recently seen per . 2nd request

## 2020-07-24 NOTE — TELEPHONE ENCOUNTER
Spoke to patient in regards to message. Stated to  Patient that he can come in early to the 5th floor. Patient stated that he was on his way. Then stated thank you. Thanks.

## 2020-07-25 ENCOUNTER — NURSE TRIAGE (OUTPATIENT)
Dept: ADMINISTRATIVE | Facility: CLINIC | Age: 50
End: 2020-07-25

## 2020-07-25 NOTE — TELEPHONE ENCOUNTER
Pt c/o of temp 101.4 approx 2hrs ago. No associated s/s.    Reason for Disposition   [1] Fever AND [2] no signs of serious infection or localizing symptoms (all other triage questions negative)    Additional Information   Negative: Shock suspected (e.g., cold/pale/clammy skin, too weak to stand, low BP, rapid pulse)   Negative: Difficult to awaken or acting confused (e.g., disoriented, slurred speech)   Negative: [1] Difficulty breathing AND [2] bluish lips, tongue or face   Negative: New onset rash with multiple purple (or blood-colored) spots or dots   Negative: Sounds like a life-threatening emergency to the triager   Negative: Fever in a cancer patient who is currently (or recently) receiving chemotherapy or radiation therapy, or cancer patient who has metastatic or end-stage cancer and is receiving palliative care   Negative: Pregnant   Negative: Postpartum (from 0 to 6 weeks after delivery)   Negative: Fever onset within 24 hours of receiving vaccine   Negative: [1] Fever AND [2] within 14 days of COVID-19 Exposure   Negative: Other symptom is present, see that guideline  (e.g., symptoms of cough, runny nose, sore throat, earache, abdominal pain, diarrhea, vomiting)   Negative: [1] Headache AND [2] stiff neck (can't touch chin to chest)   Negative: Difficulty breathing   Negative: IV drug abuse   Negative: [1] Drinking very little AND [2] dehydration suspected (e.g., no urine > 12 hours, very dry mouth, very lightheaded)   Negative: Patient sounds very sick or weak to the triager  (Exception: mild weakness and hasn't taken fever medicine)   Negative: Fever > 104 F (40 C)   Negative: [1] Fever > 101 F (38.3 C) AND [2] age > 60   Negative: [1] Fever > 100.0 F (37.8 C) AND [2] bedridden (e.g., nursing home patient, CVA, chronic illness, recovering from surgery)   Negative: [1] Fever > 100.0 F (37.8 C) AND [2] indwelling urinary catheter (e.g., James, Coude)   Negative: [1] Fever > 100.0 F  (37.8 C) AND [2] has port (portacath), central line, or PICC line   Negative: [1] Fever > 100.0 F (37.8 C) AND [2] diabetes mellitus or weak immune system (e.g., HIV positive, cancer chemo, splenectomy, organ transplant, chronic steroids)   Negative: [1] Fever > 100.0 F (37.8 C) AND [2] surgery in the last month   Negative: Transplant patient (e.g., kidney, liver, lung, heart)   Negative: Fever present > 3 days (72 hours)   Negative: [1] Fever > 100.0 F (37.8 C) AND [2] foreign travel to a developing country in the last month   Negative: [1] Intermittent fever > 100.0 F (37.8 C) AND [2] lasts > 3 weeks    Protocols used: FEVER-A-AH

## 2020-07-27 ENCOUNTER — HOSPITAL ENCOUNTER (OUTPATIENT)
Dept: PREADMISSION TESTING | Facility: HOSPITAL | Age: 50
Discharge: HOME | End: 2020-07-27
Attending: ORTHOPAEDIC SURGERY
Payer: COMMERCIAL

## 2020-07-27 VITALS
TEMPERATURE: 99 F | OXYGEN SATURATION: 99 % | WEIGHT: 211.63 LBS | DIASTOLIC BLOOD PRESSURE: 56 MMHG | RESPIRATION RATE: 18 BRPM | HEIGHT: 68 IN | SYSTOLIC BLOOD PRESSURE: 140 MMHG | HEART RATE: 92 BPM | BODY MASS INDEX: 32.07 KG/M2

## 2020-07-27 DIAGNOSIS — Z01.818 PREOP TESTING: Primary | ICD-10-CM

## 2020-07-27 LAB
ANION GAP SERPL CALC-SCNC: 8 MMOL/L (ref 8–16)
BASOPHILS # BLD AUTO: 0.03 K/UL (ref 0–0.2)
BASOPHILS NFR BLD: 0.5 % (ref 0–1.9)
BUN SERPL-MCNC: 12 MG/DL (ref 6–20)
CALCIUM SERPL-MCNC: 9.6 MG/DL (ref 8.7–10.5)
CHLORIDE SERPL-SCNC: 99 MMOL/L (ref 95–110)
CO2 SERPL-SCNC: 30 MMOL/L (ref 23–29)
CREAT SERPL-MCNC: 1.3 MG/DL (ref 0.5–1.4)
DIFFERENTIAL METHOD: ABNORMAL
EOSINOPHIL # BLD AUTO: 0.2 K/UL (ref 0–0.5)
EOSINOPHIL NFR BLD: 3.2 % (ref 0–8)
ERYTHROCYTE [DISTWIDTH] IN BLOOD BY AUTOMATED COUNT: 11.7 % (ref 11.5–14.5)
EST. GFR  (AFRICAN AMERICAN): >60 ML/MIN/1.73 M^2
EST. GFR  (NON AFRICAN AMERICAN): >60 ML/MIN/1.73 M^2
GLUCOSE SERPL-MCNC: 96 MG/DL (ref 70–110)
HCT VFR BLD AUTO: 37.4 % (ref 40–54)
HGB BLD-MCNC: 13 G/DL (ref 14–18)
IMM GRANULOCYTES # BLD AUTO: 0.01 K/UL (ref 0–0.04)
IMM GRANULOCYTES NFR BLD AUTO: 0.2 % (ref 0–0.5)
LYMPHOCYTES # BLD AUTO: 1.7 K/UL (ref 1–4.8)
LYMPHOCYTES NFR BLD: 26.7 % (ref 18–48)
MCH RBC QN AUTO: 30.4 PG (ref 27–31)
MCHC RBC AUTO-ENTMCNC: 34.8 G/DL (ref 32–36)
MCV RBC AUTO: 87 FL (ref 82–98)
MONOCYTES # BLD AUTO: 0.8 K/UL (ref 0.3–1)
MONOCYTES NFR BLD: 12.9 % (ref 4–15)
NEUTROPHILS # BLD AUTO: 3.5 K/UL (ref 1.8–7.7)
NEUTROPHILS NFR BLD: 56.5 % (ref 38–73)
NRBC BLD-RTO: 0 /100 WBC
PLATELET # BLD AUTO: 233 K/UL (ref 150–350)
PMV BLD AUTO: 8.5 FL (ref 9.2–12.9)
POTASSIUM SERPL-SCNC: 3.9 MMOL/L (ref 3.5–5.1)
RBC # BLD AUTO: 4.28 M/UL (ref 4.6–6.2)
SARS-COV-2 RDRP RESP QL NAA+PROBE: NEGATIVE
SODIUM SERPL-SCNC: 137 MMOL/L (ref 136–145)
WBC # BLD AUTO: 6.21 K/UL (ref 3.9–12.7)

## 2020-07-27 PROCEDURE — 85025 COMPLETE CBC W/AUTO DIFF WBC: CPT

## 2020-07-27 PROCEDURE — U0002 COVID-19 LAB TEST NON-CDC: HCPCS

## 2020-07-27 PROCEDURE — 80048 BASIC METABOLIC PNL TOTAL CA: CPT

## 2020-07-27 NOTE — PRE ADMISSION SCREENING
Dr. Salmeron rev pt pmh and recent ER visits x 2--aware PCP clearance in [facility]--states no cardiac clearance needed.  Before this fx pt reports daily strenuous activity at work--and denies sob/kohler/cp episodes during the activity.

## 2020-07-27 NOTE — DISCHARGE INSTRUCTIONS
Your procedure  is scheduled for __Wednesday, 7/29/2020________.    Call [phone] between 2pm and 5pm on __Tuesday, 7/28/2020__________to find out your arrival time for the day of surgery. Time:__________.    Report to the Emergency Department on the day of your surgery.  You will be escorted to the admitting unit.        Important instructions:   Do not eat or drink after 12 midnight, including water.  It is okay to brush your teeth.  Do not have gum, candy or mints.     Take only these medications with a small sip of water on the morning of your surgery: _Percocet and Zofran if you need it. Take your other meds as usual at night.______.    DO NOT take any diabetic medication on the morning of surgery unless instructed to do so by your doctor or pre op nurse.    STOP taking Aspirin, Ibuprofen, Motrin, Mobic, Advil, Aleve, Fish oil, and Vitamin E for at least 7 days before your surgery. You may use Tylenol unless otherwise instructed by your doctor.  Follow your doctor and surgeon's instructions concerning prescribed blood thinners such as: Plavix, Eliquis, Xarelto, Coumadin, Warfarin, etc.       Prep instructions:  SHOWER   OTHER ____________________     Please shower the night before and the morning of your surgery.      Use Hibiclens soap as instructed by your pre op nurse (do not use on face or genital area).   Please place clean linens on your bed the night before surgery and wear fresh clean clothing after each shower.     DO NOT shave procedural area at least 5 days before surgery due to increased risk of skin irritation and/or possible infection.     You may wear deodorant only. Do not wear make- up, mascara, powder, lotion, perfume, or cologne.     Do not wear any jewelry or have anything metal on your body (hairpins, clips, etc.).     You will be asked to remove any dentures or partials for the procedure.     Please bring any documents given to you by your doctor.     If you are going home on the  same day of surgery, you must arrange for a family member or a friend to drive you home.  Public transportation is prohibited.  You will not be able to drive home if you were given anesthesia or sedation.     Children under 18 years of age require a parent/guardian present the entire time they are here.     Wear loose fitting clothes allowing for bandages.     You may leave money and valuables home.  You may bring a cell phone.     Important: Call your surgeon/doctor if fever, chills, or illness should occur before your surgery.    Call us at the Pre-op Center at [phone]  if needed.

## 2020-07-29 ENCOUNTER — ANESTHESIA (OUTPATIENT)
Dept: SURGERY | Facility: HOSPITAL | Age: 50
End: 2020-07-29
Payer: COMMERCIAL

## 2020-07-29 ENCOUNTER — ANESTHESIA EVENT (OUTPATIENT)
Dept: SURGERY | Facility: HOSPITAL | Age: 50
End: 2020-07-29
Payer: COMMERCIAL

## 2020-07-29 ENCOUNTER — HOSPITAL ENCOUNTER (OUTPATIENT)
Facility: HOSPITAL | Age: 50
Discharge: HOME | End: 2020-07-29
Attending: ORTHOPAEDIC SURGERY | Admitting: ORTHOPAEDIC SURGERY
Payer: COMMERCIAL

## 2020-07-29 VITALS
OXYGEN SATURATION: 97 % | WEIGHT: 211.63 LBS | HEART RATE: 88 BPM | BODY MASS INDEX: 32.18 KG/M2 | RESPIRATION RATE: 17 BRPM | SYSTOLIC BLOOD PRESSURE: 141 MMHG | DIASTOLIC BLOOD PRESSURE: 75 MMHG | TEMPERATURE: 98 F

## 2020-07-29 DIAGNOSIS — S82.841A CLOSED BIMALLEOLAR FRACTURE OF RIGHT ANKLE, INITIAL ENCOUNTER: Primary | ICD-10-CM

## 2020-07-29 DIAGNOSIS — S82.891B TYPE I OR II OPEN FRACTURE OF RIGHT ANKLE, INITIAL ENCOUNTER: ICD-10-CM

## 2020-07-29 DIAGNOSIS — Z01.818 PREOP TESTING: ICD-10-CM

## 2020-07-29 DIAGNOSIS — S82.891A ANKLE FRACTURE, RIGHT: ICD-10-CM

## 2020-07-29 PROCEDURE — 63600175 PHARM REV CODE 636 W HCPCS: Performed by: ORTHOPAEDIC SURGERY

## 2020-07-29 PROCEDURE — D9220A PRA ANESTHESIA: Mod: CRNA,,, | Performed by: NURSE ANESTHETIST, CERTIFIED REGISTERED

## 2020-07-29 PROCEDURE — 64445 NJX AA&/STRD SCIATIC NRV IMG: CPT | Performed by: ANESTHESIOLOGY

## 2020-07-29 PROCEDURE — 27201423 OPTIME MED/SURG SUP & DEVICES STERILE SUPPLY: Performed by: ORTHOPAEDIC SURGERY

## 2020-07-29 PROCEDURE — C1776 JOINT DEVICE (IMPLANTABLE): HCPCS | Performed by: ORTHOPAEDIC SURGERY

## 2020-07-29 PROCEDURE — 76942 PR U/S GUIDANCE FOR NEEDLE GUIDANCE: ICD-10-PCS | Mod: 26,,, | Performed by: ANESTHESIOLOGY

## 2020-07-29 PROCEDURE — 25000003 PHARM REV CODE 250: Performed by: ORTHOPAEDIC SURGERY

## 2020-07-29 PROCEDURE — D9220A PRA ANESTHESIA: ICD-10-PCS | Mod: CRNA,,, | Performed by: NURSE ANESTHETIST, CERTIFIED REGISTERED

## 2020-07-29 PROCEDURE — 63600175 PHARM REV CODE 636 W HCPCS: Performed by: NURSE ANESTHETIST, CERTIFIED REGISTERED

## 2020-07-29 PROCEDURE — 71000039 HC RECOVERY, EACH ADD'L HOUR: Performed by: ORTHOPAEDIC SURGERY

## 2020-07-29 PROCEDURE — 64450 NJX AA&/STRD OTHER PN/BRANCH: CPT | Mod: 59,RT,, | Performed by: ANESTHESIOLOGY

## 2020-07-29 PROCEDURE — 63600175 PHARM REV CODE 636 W HCPCS: Performed by: ANESTHESIOLOGY

## 2020-07-29 PROCEDURE — D9220A PRA ANESTHESIA: Mod: ANES,,, | Performed by: ANESTHESIOLOGY

## 2020-07-29 PROCEDURE — 36000709 HC OR TIME LEV III EA ADD 15 MIN: Performed by: ORTHOPAEDIC SURGERY

## 2020-07-29 PROCEDURE — 25000003 PHARM REV CODE 250: Performed by: NURSE ANESTHETIST, CERTIFIED REGISTERED

## 2020-07-29 PROCEDURE — 27814 PR OPEN TREATMENT BIMALLEOLAR ANKLE FRACTURE: ICD-10-PCS | Mod: RT,,, | Performed by: ORTHOPAEDIC SURGERY

## 2020-07-29 PROCEDURE — 37000009 HC ANESTHESIA EA ADD 15 MINS: Performed by: ORTHOPAEDIC SURGERY

## 2020-07-29 PROCEDURE — 76942 ECHO GUIDE FOR BIOPSY: CPT | Mod: 26,,, | Performed by: ANESTHESIOLOGY

## 2020-07-29 PROCEDURE — 36000708 HC OR TIME LEV III 1ST 15 MIN: Performed by: ORTHOPAEDIC SURGERY

## 2020-07-29 PROCEDURE — 25000242 PHARM REV CODE 250 ALT 637 W/ HCPCS: Performed by: NURSE ANESTHETIST, CERTIFIED REGISTERED

## 2020-07-29 PROCEDURE — 71000016 HC POSTOP RECOV ADDL HR: Performed by: ORTHOPAEDIC SURGERY

## 2020-07-29 PROCEDURE — 27814 TREATMENT OF ANKLE FRACTURE: CPT | Mod: RT,,, | Performed by: ORTHOPAEDIC SURGERY

## 2020-07-29 PROCEDURE — D9220A PRA ANESTHESIA: ICD-10-PCS | Mod: ANES,,, | Performed by: ANESTHESIOLOGY

## 2020-07-29 PROCEDURE — 76942 ECHO GUIDE FOR BIOPSY: CPT | Performed by: ANESTHESIOLOGY

## 2020-07-29 PROCEDURE — 37000008 HC ANESTHESIA 1ST 15 MINUTES: Performed by: ORTHOPAEDIC SURGERY

## 2020-07-29 PROCEDURE — 71000015 HC POSTOP RECOV 1ST HR: Performed by: ORTHOPAEDIC SURGERY

## 2020-07-29 PROCEDURE — 71000033 HC RECOVERY, INTIAL HOUR: Performed by: ORTHOPAEDIC SURGERY

## 2020-07-29 PROCEDURE — C1713 ANCHOR/SCREW BN/BN,TIS/BN: HCPCS | Performed by: ORTHOPAEDIC SURGERY

## 2020-07-29 PROCEDURE — 64450 PR NERVE BLOCK INJ, ANES/STEROID, OTHER PERIPHERAL: ICD-10-PCS | Mod: 59,RT,, | Performed by: ANESTHESIOLOGY

## 2020-07-29 PROCEDURE — 27200651 HC AIRWAY, LMA: Performed by: ANESTHESIOLOGY

## 2020-07-29 DEVICE — SCREW CORTEX 3.5 X 40: Type: IMPLANTABLE DEVICE | Site: ANKLE | Status: FUNCTIONAL

## 2020-07-29 DEVICE — SCREW LOCKING 3.5MM X 14MM.: Type: IMPLANTABLE DEVICE | Site: ANKLE | Status: FUNCTIONAL

## 2020-07-29 DEVICE — SCREW STRDRV REC T8 2.7X12 SS: Type: IMPLANTABLE DEVICE | Site: ANKLE | Status: FUNCTIONAL

## 2020-07-29 DEVICE — SCREW CORTEX 3.5MM X 16MM: Type: IMPLANTABLE DEVICE | Site: ANKLE | Status: FUNCTIONAL

## 2020-07-29 DEVICE — IMPLANTABLE DEVICE: Type: IMPLANTABLE DEVICE | Site: ANKLE | Status: FUNCTIONAL

## 2020-07-29 DEVICE — SCREW CORTEX 3.5 X 30: Type: IMPLANTABLE DEVICE | Site: ANKLE | Status: FUNCTIONAL

## 2020-07-29 DEVICE — SCREW CORTEX 2.7 X 20.: Type: IMPLANTABLE DEVICE | Site: ANKLE | Status: FUNCTIONAL

## 2020-07-29 DEVICE — SCREW 2.7X16MM: Type: IMPLANTABLE DEVICE | Site: ANKLE | Status: FUNCTIONAL

## 2020-07-29 DEVICE — SCREW 2.7X14MM: Type: IMPLANTABLE DEVICE | Site: ANKLE | Status: FUNCTIONAL

## 2020-07-29 DEVICE — SCREW CORTEX 3.5MM X 14MM.: Type: IMPLANTABLE DEVICE | Site: ANKLE | Status: FUNCTIONAL

## 2020-07-29 DEVICE — PLATE 6 HOLE LOCKING 1/3 TUBUL: Type: IMPLANTABLE DEVICE | Site: ANKLE | Status: FUNCTIONAL

## 2020-07-29 DEVICE — SCREW CORTEX 3.5MM 36MM: Type: IMPLANTABLE DEVICE | Site: ANKLE | Status: FUNCTIONAL

## 2020-07-29 RX ORDER — ONDANSETRON 8 MG/1
8 TABLET, ORALLY DISINTEGRATING ORAL EVERY 8 HOURS PRN
Qty: 20 TABLET | Refills: 0 | Status: SHIPPED | OUTPATIENT
Start: 2020-07-29 | End: 2020-08-14 | Stop reason: SDUPTHER

## 2020-07-29 RX ORDER — SODIUM CHLORIDE, SODIUM LACTATE, POTASSIUM CHLORIDE, CALCIUM CHLORIDE 600; 310; 30; 20 MG/100ML; MG/100ML; MG/100ML; MG/100ML
INJECTION, SOLUTION INTRAVENOUS CONTINUOUS
Status: ACTIVE | OUTPATIENT
Start: 2020-07-29

## 2020-07-29 RX ORDER — OXYCODONE AND ACETAMINOPHEN 10; 325 MG/1; MG/1
1 TABLET ORAL ONCE
Status: COMPLETED | OUTPATIENT
Start: 2020-07-29 | End: 2020-07-29

## 2020-07-29 RX ORDER — GLYCOPYRROLATE 0.2 MG/ML
INJECTION INTRAMUSCULAR; INTRAVENOUS
Status: DISCONTINUED | OUTPATIENT
Start: 2020-07-29 | End: 2020-07-29

## 2020-07-29 RX ORDER — PROPOFOL 10 MG/ML
VIAL (ML) INTRAVENOUS
Status: DISCONTINUED | OUTPATIENT
Start: 2020-07-29 | End: 2020-07-29

## 2020-07-29 RX ORDER — LIDOCAINE HYDROCHLORIDE 10 MG/ML
1 INJECTION, SOLUTION EPIDURAL; INFILTRATION; INTRACAUDAL; PERINEURAL ONCE
Status: ACTIVE | OUTPATIENT
Start: 2020-07-29

## 2020-07-29 RX ORDER — ALBUTEROL SULFATE 90 UG/1
AEROSOL, METERED RESPIRATORY (INHALATION)
Status: DISCONTINUED | OUTPATIENT
Start: 2020-07-29 | End: 2020-07-29

## 2020-07-29 RX ORDER — FENTANYL CITRATE 50 UG/ML
25 INJECTION, SOLUTION INTRAMUSCULAR; INTRAVENOUS EVERY 5 MIN PRN
Status: DISCONTINUED | OUTPATIENT
Start: 2020-07-29 | End: 2020-07-29 | Stop reason: HOSPADM

## 2020-07-29 RX ORDER — ONDANSETRON 2 MG/ML
INJECTION INTRAMUSCULAR; INTRAVENOUS
Status: DISCONTINUED | OUTPATIENT
Start: 2020-07-29 | End: 2020-07-29

## 2020-07-29 RX ORDER — LIDOCAINE HCL/PF 100 MG/5ML
SYRINGE (ML) INTRAVENOUS
Status: DISCONTINUED | OUTPATIENT
Start: 2020-07-29 | End: 2020-07-29

## 2020-07-29 RX ORDER — ACETAMINOPHEN 10 MG/ML
1000 INJECTION, SOLUTION INTRAVENOUS ONCE
Status: COMPLETED | OUTPATIENT
Start: 2020-07-29 | End: 2020-07-29

## 2020-07-29 RX ORDER — FENTANYL CITRATE 50 UG/ML
INJECTION, SOLUTION INTRAMUSCULAR; INTRAVENOUS
Status: DISCONTINUED | OUTPATIENT
Start: 2020-07-29 | End: 2020-07-29

## 2020-07-29 RX ORDER — SODIUM CHLORIDE 0.9 % (FLUSH) 0.9 %
3 SYRINGE (ML) INJECTION
Status: DISCONTINUED | OUTPATIENT
Start: 2020-07-29 | End: 2020-07-29 | Stop reason: HOSPADM

## 2020-07-29 RX ORDER — HYDROMORPHONE HYDROCHLORIDE 2 MG/ML
0.2 INJECTION, SOLUTION INTRAMUSCULAR; INTRAVENOUS; SUBCUTANEOUS EVERY 5 MIN PRN
Status: DISCONTINUED | OUTPATIENT
Start: 2020-07-29 | End: 2020-07-29 | Stop reason: HOSPADM

## 2020-07-29 RX ORDER — ONDANSETRON 4 MG/1
4 TABLET, ORALLY DISINTEGRATING ORAL EVERY 8 HOURS PRN
Qty: 15 TABLET | Refills: 0 | Status: SHIPPED | OUTPATIENT
Start: 2020-07-29 | End: 2021-02-01

## 2020-07-29 RX ORDER — MIDAZOLAM HYDROCHLORIDE 1 MG/ML
INJECTION, SOLUTION INTRAMUSCULAR; INTRAVENOUS
Status: DISCONTINUED | OUTPATIENT
Start: 2020-07-29 | End: 2020-07-29

## 2020-07-29 RX ORDER — OXYCODONE AND ACETAMINOPHEN 10; 325 MG/1; MG/1
1 TABLET ORAL EVERY 6 HOURS PRN
Qty: 25 TABLET | Refills: 0 | Status: SHIPPED | OUTPATIENT
Start: 2020-07-29 | End: 2021-02-01

## 2020-07-29 RX ADMIN — PROPOFOL 200 MG: 10 INJECTION, EMULSION INTRAVENOUS at 01:07

## 2020-07-29 RX ADMIN — ROPIVACAINE HYDROCHLORIDE 10 ML: 10 INJECTION, SOLUTION EPIDURAL at 03:07

## 2020-07-29 RX ADMIN — OXYCODONE HYDROCHLORIDE AND ACETAMINOPHEN 1 TABLET: 10; 325 TABLET ORAL at 05:07

## 2020-07-29 RX ADMIN — FENTANYL CITRATE 100 MCG: 50 INJECTION INTRAMUSCULAR; INTRAVENOUS at 01:07

## 2020-07-29 RX ADMIN — HYDROMORPHONE HYDROCHLORIDE 0.2 MG: 2 INJECTION, SOLUTION INTRAMUSCULAR; INTRAVENOUS; SUBCUTANEOUS at 04:07

## 2020-07-29 RX ADMIN — FENTANYL CITRATE 50 MCG: 50 INJECTION INTRAMUSCULAR; INTRAVENOUS at 03:07

## 2020-07-29 RX ADMIN — FENTANYL CITRATE 50 MCG: 50 INJECTION INTRAMUSCULAR; INTRAVENOUS at 02:07

## 2020-07-29 RX ADMIN — GLYCOPYRROLATE 0.2 MG: 0.2 INJECTION, SOLUTION INTRAMUSCULAR; INTRAVENOUS at 12:07

## 2020-07-29 RX ADMIN — HYDROMORPHONE HYDROCHLORIDE 0.2 MG: 2 INJECTION, SOLUTION INTRAMUSCULAR; INTRAVENOUS; SUBCUTANEOUS at 03:07

## 2020-07-29 RX ADMIN — ACETAMINOPHEN 1000 MG: 10 INJECTION, SOLUTION INTRAVENOUS at 03:07

## 2020-07-29 RX ADMIN — LIDOCAINE HYDROCHLORIDE 100 MG: 20 INJECTION, SOLUTION INTRAVENOUS at 01:07

## 2020-07-29 RX ADMIN — ALBUTEROL SULFATE 2 PUFF: 90 AEROSOL, METERED RESPIRATORY (INHALATION) at 01:07

## 2020-07-29 RX ADMIN — MIDAZOLAM HYDROCHLORIDE 2 MG: 1 INJECTION, SOLUTION INTRAMUSCULAR; INTRAVENOUS at 12:07

## 2020-07-29 RX ADMIN — FENTANYL CITRATE 75 MCG: 50 INJECTION INTRAMUSCULAR; INTRAVENOUS at 01:07

## 2020-07-29 RX ADMIN — ONDANSETRON 4 MG: 2 INJECTION, SOLUTION INTRAMUSCULAR; INTRAVENOUS at 12:07

## 2020-07-29 RX ADMIN — FENTANYL CITRATE 25 MCG: 50 INJECTION INTRAMUSCULAR; INTRAVENOUS at 12:07

## 2020-07-29 RX ADMIN — VANCOMYCIN HYDROCHLORIDE 1500 MG: 1.5 INJECTION, POWDER, LYOPHILIZED, FOR SOLUTION INTRAVENOUS at 12:07

## 2020-07-29 RX ADMIN — SODIUM CHLORIDE, SODIUM LACTATE, POTASSIUM CHLORIDE, AND CALCIUM CHLORIDE: .6; .31; .03; .02 INJECTION, SOLUTION INTRAVENOUS at 11:07

## 2020-07-29 RX ADMIN — ONDANSETRON 4 MG: 2 INJECTION, SOLUTION INTRAMUSCULAR; INTRAVENOUS at 02:07

## 2020-07-29 NOTE — INTERVAL H&P NOTE
The patient has been examined and the H&P has been reviewed:    I concur with the findings and changes have been noted since the H&P was written: complaining of radial wrist pain with crutch use - will order filsm prior to procedure    Ankle still swollen but skin wrinkling present, will proceed with ORIF    Anesthesia/Surgery risks, benefits and alternative options discussed and understood by patient/family.          Active Hospital Problems    Diagnosis  POA    Ankle fracture, right [S86.228G]  Yes      Resolved Hospital Problems   No resolved problems to display.

## 2020-07-29 NOTE — BRIEF OP NOTE
[facility]  Brief Operative Note    Surgery Date: 7/29/2020     Surgeon(s) and Role:     * Christy Workman MD - Primary    Assisting Surgeon: None    Pre-op Diagnosis:  Type I or II open fracture of right ankle, initial encounter [S82.891B]    Post-op Diagnosis:  Post-Op Diagnosis Codes:     * Type I or II open fracture of right ankle, initial encounter [S82.891B]    Procedure(s) (LRB):  ORIF, ANKLE  (BIMALLEOLAR ANKLE FRACTURE) (Right)    Anesthesia: General    Description of the findings of the procedure(s): fracture     Estimated Blood Loss: * No values recorded between 7/29/2020  1:23 PM and 7/29/2020  3:27 PM *         Specimens:   Specimen (12h ago, onward)    None            Discharge Note    OUTCOME: Patient tolerated treatment/procedure well without complication and is now ready for discharge.    DISPOSITION: Home or Self Care    FINAL DIAGNOSIS:  Ankle fracture, right    FOLLOWUP: In clinic    DISCHARGE INSTRUCTIONS:    Discharge Procedure Orders   Diet Adult Regular     Ice to affected area     Keep surgical extremity elevated     Leave dressing on - Keep it clean, dry, and intact until clinic visit     Nursing communication   Order Comments: Please place wrist brace to right wrist     Weight bearing restrictions (specify):   Order Comments: NWB RLE        Clinical Reference Documents Added to Patient Instructions       Document    ANKLE FRACTURE OPEN REDUCTION AND INTERNAL FIXATION, HAVING (ENGLISH)    CRUTCHES (NON-WEIGHT-BEARING), DISCHARGE INSTRUCTIONS (ENGLISH)

## 2020-07-29 NOTE — DISCHARGE INSTRUCTIONS
ACTIVITY LEVEL: If you have received sedation or an anesthetic, you may feel sleepy for several hours. Rest until you are more awake. Gradually resume your normal activities.  Follow all instructions given by Dr. Workman             DIET: You may resume your home diet. If nausea is present, increase your diet gradually with fluids and bland foods.    Medications: Pain medication should be taken only if needed and as directed. If antibiotics are prescribed, the medication should be taken until completed. You will be given an updated list of you medications.    Last dose of Oxycodone 5:59 pm.       No driving, alcoholic beverages or signing legal documents for next 24 hours or while taking pain medication.  Elevate the affected extremity to a level above your heart and ice packs may be applied in intervals of 15-20 minutes on 15-20 minutes off while awake    CALL THE DOCTOR:    For any obvious bleeding (some dried blood over the incision is normal).      Redness, swelling, foul smell around incision or fever over 101.   Shortness of breath, Coughing up Bloody Sputum or Pains or Swelling in your Calves.   Persistent pain or nausea not relieved by medication.   Impaired circulation such as tingling, change in color, numbness or tingling, coldness.    If any unusual problems or difficulties occur contact your doctor. If you cannot contact your doctor but feel your signs and symptoms warrant a physicians attention return to the emergency room.        Fall Prevention  Millions of people fall every year and injure themselves. You may have had anesthesia or sedation which may increase your risk of falling. You may have health issues that put you at an increased risk of falling.     Here are ways to reduce your risk of falling.  ·   · Make your home safe by keeping walkways clear of objects you may trip over.  · Use non-slip pads under rugs. Do not use area rugs or small throw rugs.  · Use non-slip mats in bathtubs and  showers.  · Install handrails and lights on staircases.  · Do not walk in poorly lit areas.  · Do not stand on chairs or wobbly ladders.  · Use caution when reaching overhead or looking upward. This position can cause a loss of balance.  · Be sure your shoes fit properly, have non-slip bottoms and are in good condition.   · Wear shoes both inside and out. Avoid going barefoot or wearing slippers.  · Be cautious when going up and down stairs, curbs, and when walking on uneven sidewalks.  · If your balance is poor, consider using a cane or walker.  · If your fall was related to alcohol use, stop or limit alcohol intake.   · If your fall was related to use of sleeping medicines, talk to your doctor about this. You may need to reduce your dosage at bedtime if you awaken during the night to go to the bathroom.    · To reduce the need for nighttime bathroom trips:  ¨ Avoid drinking fluids for several hours before going to bed  ¨ Empty your bladder before going to bed  ¨ Men can keep a urinal at the bedside  · Stay as active as you can. Balance, flexibility, strength, and endurance all come from exercise. They all play a role in preventing falls. Ask your healthcare provider which types of activity are right for you.  · Get your vision checked on a regular basis.  · If you have pets, know where they are before you stand up or walk so you don't trip over them.  Use night lights.

## 2020-07-29 NOTE — ANESTHESIA PREPROCEDURE EVALUATION
07/29/2020  Tommy Lamar Sr. is a 50 y.o., male.    Anesthesia Evaluation     I have reviewed the Nursing Notes.       Review of Systems  Anesthesia Hx:  No problems with previous Anesthesia   Social:  Non-Smoker    Cardiovascular:   Exercise tolerance: good Denies Pacemaker. Hypertension  Denies Valvular problems/Murmurs.  Denies MI.   Denies CABG/stent. Dysrhythmias (paroxysmal SVT)   Denies Angina.             denies PVD no hyperlipidemia     Echo 1/2019:  · Normal left ventricular systolic function. The estimated ejection fraction is 60%  · Normal LV diastolic function.  · Normal right ventricular systolic function.  · The estimated PA systolic pressure is 38 mm Hg  · Atrial septal aneurysm   Pulmonary:  Pulmonary Normal    Renal/:   renal calculi    Hepatic/GI:   No Bowel Prep. Denies PUD. GERD Denies Liver Disease. Denies Hepatitis.    Neurological:  Neurology Normal    Endocrine:  Endocrine Normal        Physical Exam  General:  Obesity    Airway/Jaw/Neck:  AIRWAY FINDINGS: Normal      Chest/Lungs:  Chest/Lungs Clear    Heart/Vascular:  Heart Findings: Normal       Mental Status:  Mental Status Findings: Normal        Anesthesia Plan  Type of Anesthesia, risks & benefits discussed:  Anesthesia Type:  general  Patient's Preference:   Intra-op Monitoring Plan: standard ASA monitors  Intra-op Monitoring Plan Comments:   Post Op Pain Control Plan:   Post Op Pain Control Plan Comments:   Induction:   IV  Beta Blocker:  Patient is not currently on a Beta-Blocker (No further documentation required).       Informed Consent: Patient understands risks and agrees with Anesthesia plan.  Questions answered. Anesthesia consent signed with patient.  ASA Score: 2     Day of Surgery Review of History & Physical:  There are no significant changes.  H&P update referred to the provider.         Ready For Surgery  From Anesthesia Perspective.

## 2020-07-29 NOTE — OP NOTE
Operative Note     Date of Procedure:   7/29/2020     Attending Physician:   Christy Workamn MD    Pre-Op Diagnosis:   Right bimalleolar ankle fracture     Post-Op Diagnosis:   Right bimalleolar ankle fracture     Procedure:   1.  Open Reduction and Internal Fixation of Right lateral malleolus  2.  Open reduction internal fixation of Right medial malleolus    Implants:  Implant Name Type Inv. Item Serial No.  Lot No. LRB No. Used Action   4 Hole Distal Fibula Plate    SYNTHES  Right 1 Implanted   SCREW CORTEX 3.5MM X 14MM. - [id number]  SCREW CORTEX 3.5MM X 14MM.  SYNTHES  Right 2 Implanted   SCREW CORTEX 2.7 X 20. - [id number]  SCREW CORTEX 2.7 X 20.  SYNTHES  Right 1 Implanted   SCREW L-S-D S/T 5.0X 48 - [id number]  SCREW L-S-D S/T 5.0X 48  SYNTHES  Right 1 Implanted   SCREW CORTEX 3.5MM X 16MM - [id number]  SCREW CORTEX 3.5MM X 16MM  SY[employer]THES  Right 1 Implanted   SCREW 2.7X16MM - [id number]  SCREW 2.7X16MM  SYNTHES  Right 2 Implanted   SCREW STRDRV REC T8 2.7X12 SS - [id number]  SCREW STRDRV REC T8 2.7X12 SS  SYNTHES  Right 1 Implanted   SCREW 2.7X14MM - [id number]  SCREW 2.7X14MM  SYNTHES  Right 1 Implanted     Estimated Blood Loss:   Less than 20 ml    Complications:   None    Tourniquet Time:   101 minutes      Specimens:   None    Drains:   None    Tommy Lamar Sr. is a 50 y.o.  male with a right bimalleolar ankle fracture sustained when he fell off a ladder on 7/22/20.  He was seen in the ER, splinted and   He presented to my clinic for follow-up. Surgical treatment was recommended given the fracture pattern. He consented to the procedure in writing.         Procedure:  He was marked in the preoperative holding area. He reported wrist pain and an XR was done prior to the procedure.  The patient was brought to the operating room and underwent general anesthesia.  He was positioned supine on the operating table and all bony prominences were padded. A non sterile tourniquet was applied to the  operative leg and an SCD was placed to the contralateral lower extremity.  The  right lower extremity was prepped and draped in the usual sterile fashion. A time out was called confirming the correct patient, site, side procedure and that appropriate antibiotics had been administered within 30 minutes of incision.  The limb was exsanguinated using an Esmarch bandage and the tourniquet was inflated to 300 mmHg.   A lateral incision was made over the distal fibula centered over the fracture site. Sharp dissection was carried to bone over the distal fibula. More proximal dissection was performed with a combination of elevators and metzenbaun scissors. The superficial peroneal nerve was not encountered during the procedure. The fracture was identified and cleaned of all hematoma and debris. It was reduced with fracture reduction clamps. A lag screw could not be used because of comminution. A Synthes distal fibula locking plate with associated screws was applied.    Flouroscopy was used throughout this process to confirm that the plate was in proper position, reduction was maintained and that all screws were extra-articular and of proper length.  Following reduction plate application the syndesmosis was tested with a combination of external rotation stress and cotton test under fluoroscopic visualization.  It was noted to be stable.     Attention was then turned to the medial malleolus.    An incision was made over the medial malleolus and dissection was carried down to the level of the bone. The fracture site was identified and cleaned and reduced using a ball spike pusher and held in place with k wires.  Once reduction was confirmed with fluoroscopy, a 6 hols 1/3 semi tubular plate and associated screws was placed across the fracture under direct fluoroscopic guidance.  Flouroscopy was used to confirm that reduction was maintained and that all screws were extra-articular and of proper length.     At this time the  tourniquet was let down.  No significant bleeding was noted.  Both wounds were copiously irrigated with sterile saline and closed with 3-0 vicryl and nylon. Sterile dressings and a posterior slab splint with stirrups was applied.    Instrument, sponge, and needle counts were correct prior to wound closure and at the conclusion of the case.      The patient was awoken from anesthesia, tolerated the procedure well and taken to recovery in stable condition.     Post-operative Plan: He will keep the splint on and keep it clean and dry until  seen in clinic in 2 weeks. Sutures will be removed at that time if the wound is healing appropriately - He will be nonweightbearing for 2 weeks and then touchdown weight-bearing for the remainder of the restricted weight-bearing period - likely 6 weeks.

## 2020-07-30 ENCOUNTER — TELEPHONE (OUTPATIENT)
Dept: ORTHOPEDICS | Facility: CLINIC | Age: 50
End: 2020-07-30

## 2020-07-30 RX ORDER — ROPIVACAINE HYDROCHLORIDE 10 MG/ML
INJECTION EPIDURAL; INFILTRATION; PERINEURAL
Status: DISCONTINUED | OUTPATIENT
Start: 2020-07-29 | End: 2020-07-30

## 2020-07-30 NOTE — ANESTHESIA PROCEDURE NOTES
Peripheral Block right leg    Patient location during procedure: pre-op   Block not for primary anesthetic.  Reason for block: at surgeon's request and post-op pain management   Post-op Pain Location: right ankle  Start time: 7/29/2020 3:45 PM  Timeout: 7/29/2020 3:45 PM   End time: 7/29/2020 3:55 PM    Staffing  Authorizing Provider: Hola Salmeron MD  Performing Provider: Hola Salmeron MD    Preanesthetic Checklist  Completed: patient identified, site marked, surgical consent, pre-op evaluation, timeout performed, IV checked, risks and benefits discussed and monitors and equipment checked  Peripheral Block  Patient position: supine  Prep: ChloraPrep  Patient monitoring: heart rate, cardiac monitor, continuous pulse ox, continuous capnometry and frequent blood pressure checks  Block type: popliteal  Laterality: right  Injection technique: single shot  Needle  Needle type: Stimuplex   Needle gauge: 21 G  Needle length: 4 in  Needle localization: anatomical landmarks and ultrasound guidance   -ultrasound image captured on disc.  Assessment  Injection assessment: negative aspiration, negative parasthesia and local visualized surrounding nerve  Paresthesia pain: none  Heart rate change: no  Slow fractionated injection: yes  Additional Notes  VSS.  [facility] RN monitoring vitals throughout procedure.  Patient tolerated procedure well.

## 2020-07-30 NOTE — TELEPHONE ENCOUNTER
----- Message from Karuna Jay sent at 7/30/2020 11:05 AM CDT -----  Type: Patient Call Back    Who called: Self     What is the request in detail: calling in regards to questions for  About procedure that was done on 7/29/20     Can the clinic reply by [facility]? Call back     Would the patient rather a call back or a response via [facility]? Call back     Best call back number: [phone]

## 2020-07-30 NOTE — ANESTHESIA POSTPROCEDURE EVALUATION
Anesthesia Post Evaluation    Patient: Tommy Lamar     Procedure(s) Performed: Procedure(s) (LRB):  ORIF, ANKLE  (BIMALLEOLAR ANKLE FRACTURE) (Right)    Final Anesthesia Type: general    Patient location during evaluation: PACU  Patient participation: Yes- Able to Participate  Level of consciousness: awake and alert  Post-procedure vital signs: reviewed and stable  Pain management: adequate  Airway patency: patent    PONV status at discharge: No PONV  Anesthetic complications: no      Cardiovascular status: blood pressure returned to baseline and hemodynamically stable  Respiratory status: unassisted and spontaneous ventilation  Hydration status: euvolemic  Follow-up not needed.          Vitals Value Taken Time   /75 07/29/20 1915   Temp 36.4 °C (97.6 °F) 07/29/20 1915   Pulse 88 07/29/20 1915   Resp 17 07/29/20 1915   SpO2 97 % 07/29/20 1915         Event Time   Out of Recovery 16:29:00         Pain/Dimple Score: Pain Rating Prior to Med Admin: 7 (7/29/2020  5:59 PM)  Pain Rating Post Med Admin: 4 (7/29/2020  7:15 PM)  Dimple Score: 10 (7/29/2020  6:55 PM)

## 2020-07-31 ENCOUNTER — TELEPHONE (OUTPATIENT)
Dept: ORTHOPEDICS | Facility: CLINIC | Age: 50
End: 2020-07-31

## 2020-07-31 ENCOUNTER — HOSPITAL ENCOUNTER (EMERGENCY)
Facility: HOSPITAL | Age: 50
Discharge: HOME | End: 2020-07-31
Attending: EMERGENCY MEDICINE
Payer: COMMERCIAL

## 2020-07-31 VITALS
HEIGHT: 68 IN | SYSTOLIC BLOOD PRESSURE: 129 MMHG | DIASTOLIC BLOOD PRESSURE: 67 MMHG | TEMPERATURE: 98 F | HEART RATE: 79 BPM | OXYGEN SATURATION: 95 % | BODY MASS INDEX: 31.83 KG/M2 | WEIGHT: 210 LBS | RESPIRATION RATE: 16 BRPM

## 2020-07-31 DIAGNOSIS — M10.9 ACUTE GOUT OF RIGHT WRIST, UNSPECIFIED CAUSE: Primary | ICD-10-CM

## 2020-07-31 DIAGNOSIS — M10.072 ACUTE IDIOPATHIC GOUT INVOLVING TOE OF LEFT FOOT: ICD-10-CM

## 2020-07-31 PROCEDURE — 96372 THER/PROPH/DIAG INJ SC/IM: CPT

## 2020-07-31 PROCEDURE — 25000003 PHARM REV CODE 250: Performed by: EMERGENCY MEDICINE

## 2020-07-31 PROCEDURE — 99284 EMERGENCY DEPT VISIT MOD MDM: CPT | Mod: 25

## 2020-07-31 PROCEDURE — 63600175 PHARM REV CODE 636 W HCPCS: Performed by: EMERGENCY MEDICINE

## 2020-07-31 RX ORDER — KETOROLAC TROMETHAMINE 30 MG/ML
30 INJECTION, SOLUTION INTRAMUSCULAR; INTRAVENOUS
Status: COMPLETED | OUTPATIENT
Start: 2020-07-31 | End: 2020-07-31

## 2020-07-31 RX ORDER — IBUPROFEN 800 MG/1
800 TABLET ORAL 3 TIMES DAILY
Qty: 90 TABLET | Refills: 0 | Status: SHIPPED | OUTPATIENT
Start: 2020-07-31 | End: 2020-08-13 | Stop reason: SDUPTHER

## 2020-07-31 RX ORDER — COLCHICINE 0.6 MG/1
TABLET ORAL
Qty: 30 TABLET | Refills: 0 | Status: SHIPPED | OUTPATIENT
Start: 2020-07-31 | End: 2020-09-23 | Stop reason: SDUPTHER

## 2020-07-31 RX ORDER — INDOMETHACIN 50 MG/1
50 CAPSULE ORAL 2 TIMES DAILY WITH MEALS
Qty: 10 CAPSULE | Refills: 0 | Status: SHIPPED | OUTPATIENT
Start: 2020-07-31 | End: 2020-08-05

## 2020-07-31 RX ORDER — ACETAMINOPHEN 500 MG
1000 TABLET ORAL
Status: COMPLETED | OUTPATIENT
Start: 2020-07-31 | End: 2020-07-31

## 2020-07-31 RX ORDER — MORPHINE SULFATE 10 MG/ML
4 INJECTION INTRAMUSCULAR; INTRAVENOUS; SUBCUTANEOUS
Status: COMPLETED | OUTPATIENT
Start: 2020-07-31 | End: 2020-07-31

## 2020-07-31 RX ADMIN — ACETAMINOPHEN 1000 MG: 500 TABLET ORAL at 04:07

## 2020-07-31 RX ADMIN — MORPHINE SULFATE 4 MG: 10 INJECTION INTRAVENOUS at 04:07

## 2020-07-31 RX ADMIN — METHYLPREDNISOLONE SODIUM SUCCINATE 80 MG: 40 INJECTION, POWDER, FOR SOLUTION INTRAMUSCULAR; INTRAVENOUS at 04:07

## 2020-07-31 RX ADMIN — KETOROLAC TROMETHAMINE 30 MG: 30 INJECTION, SOLUTION INTRAMUSCULAR at 04:07

## 2020-07-31 NOTE — PROGRESS NOTES
Attempted to call patient to tell him not to take indomethacin and Ibuprofen together. No answer on his phone or wifes phone. Left message stating i had an urgent message for him and requested a call back. The Office will attempt to contact him later today

## 2020-07-31 NOTE — ED PROVIDER NOTES
Encounter Date: 7/31/2020    SCRIBE #1 NOTE: ISebastián, am scribing for, and in the presence of, Toney Law MD.       History     Chief Complaint   Patient presents with    Post-op Problem     Pt c/o pain, swelling and redness to the right wrist that began yesterday evening. Pt reports that he had ankle surgery yesterday and he mentioned to the surgeon that his right wrist was hurting, and pt noted a wrist splint on the extremity after surgery. Pt states that he wore the splint for about 2 hours and then the pain began. Pt has hx of gout.     Tommy Lamar is a 50 year old male who presents to the Emergency Department complaining of right wrist pain that worsened 1 hour ago. The patient reports having a history of gout.  Patient recently underwent surgery for ankle fracture.  Prior to surgery, orthopedic surgeon performed x-ray of the wrist and found no fractures.  Due to pain, the patient's wrist was placed in a splint.  Patient reports he took the splint off today due to severe redness, pain, swelling similar to prior gout presentations.  Denies fevers chills nausea vomiting.  The patient reports that he took Advil and used ice without relief of his pain. He denies any other symptoms at this time.    The history is provided by the patient. No  was used.     Review of patient's allergies indicates:   Allergen Reactions    Pcn [penicillins] Rash     Past Medical History:   Diagnosis Date    Gout     Hypertension     Kidney stones     Nephrolithiasis     PSVT (paroxysmal supraventricular tachycardia) 12/2019     Past Surgical History:   Procedure Laterality Date    HERNIA REPAIR      KIDNEY SURGERY      TONSILLECTOMY       Family History   Family history unknown: Yes     Social History     Tobacco Use    Smoking status: Never Smoker    Smokeless tobacco: Never Used   Substance Use Topics    Alcohol use: No    Drug use: No     Review of Systems   Constitutional:  Negative for fever.   HENT: Negative for congestion.    Eyes: Negative for visual disturbance.   Respiratory: Negative for cough.    Cardiovascular: Negative for leg swelling.   Gastrointestinal: Negative for vomiting.   Musculoskeletal: Positive for arthralgias.   Skin: Negative for rash.   Neurological: Negative for facial asymmetry.   Psychiatric/Behavioral: Negative for confusion.       Physical Exam     Initial Vitals [07/31/20 0404]   BP Pulse Resp Temp SpO2   125/87 95 20 100.3 °F (37.9 °C) 99 %      MAP       --         Physical Exam    Constitutional: He appears well-developed and well-nourished. He is not diaphoretic. No distress.   HENT:   Head: Normocephalic and atraumatic.   Right Ear: External ear normal.   Left Ear: External ear normal.   Eyes: EOM are normal. Pupils are equal, round, and reactive to light. Right eye exhibits no discharge. Left eye exhibits no discharge.   Neck: Normal range of motion.   Cardiovascular: Normal rate.   Pulmonary/Chest: No respiratory distress.   Abdominal: He exhibits no distension. There is no guarding.   Musculoskeletal: Normal range of motion. Tenderness present. No edema.      Comments: Redness, tenderness, and warmth to anterior right wrist. Normal sensation.    Neurological: He is alert and oriented to person, place, and time. He has normal strength. GCS score is 15. GCS eye subscore is 4. GCS verbal subscore is 5. GCS motor subscore is 6.   Skin: Skin is warm and dry.   Psychiatric: He has a normal mood and affect. His behavior is normal.         ED Course   Procedures  Labs Reviewed - No data to display       Imaging Results    None          Medical Decision Making:   Initial Assessment:   50-year-old male with history of gout presenting with right wrist pain similar to prior presentations of gout.  Patient reports pain is intense, worsened with movement.  Denies fevers chills.  Exam reveals a tender mildly swollen mildly erythematous wrist.  No red streaking,  significant edema, other evidence of infection.  Patient given Toradol, morphine, steroids with substantial improvement of symptoms.  Range of motion substantially and approved.  Will refill prescription for colchicine and indomethacin.  Believe he is safe for discharge home.  Discussed need to follow-up primary care and/or orthopedic.  Doubt septic joint, cellulitis, other fracture.  X-ray performed just prior to splint was negative for acute fracture.  No other evidence or history of trauma.  Hand well perfused, normal sensation, normal movement and strength.            Scribe Attestation:   Scribe #1: I performed the above scribed service and the documentation accurately describes the services I performed. I attest to the accuracy of the note.                          Clinical Impression:       ICD-10-CM ICD-9-CM   1. Acute gout of right wrist, unspecified cause  M10.9 274.01   2. Acute idiopathic gout involving toe of left foot  M10.072 274.01             ED Disposition Condition    Discharge Stable        ED Prescriptions     Medication Sig Dispense Start Date End Date Auth. Provider    colchicine (COLCRYS) 0.6 mg tablet TAKE 2 TABLETS BY MOUTH AT GOUT FLARE UP & ONE TABLET ONE HOUR LATER REPEAT IN 24 HOURS IF NEEDED 30 tablet 7/31/2020  Toney Law MD    indomethacin (INDOCIN) 50 MG capsule Take 1 capsule (50 mg total) by mouth 2 (two) times daily with meals. for 5 days 10 capsule 7/31/2020 8/5/2020 Toney Law MD        Follow-up Information     Follow up With Specialties Details Why Contact Info    Negro Bell MD Family Medicine Schedule an appointment as soon as possible for a visit   [address]  [place] [address]  [phone]      [facility] Emergency Medicine  As needed, If symptoms worsen [address] [place] [address] [phone]    Christy Workman MD Orthopedic Surgery Schedule an appointment as soon as possible for a visit   As needed [address] [facility] [place] [address]  [phone]                          I, Toney Law, personally performed the services described in this documentation. All medical record entries made by the scribe were at my direction and in my presence. I have reviewed the chart and agree that the record reflects my personal performance and is accurate and complete.       Toney Law MD  07/31/20 0556

## 2020-07-31 NOTE — TELEPHONE ENCOUNTER
Called patients wife today per message request. No answer. Left voice mail instructing that the ibuprofen can be obtained over the counter which Is why I did not send an rx but I can send one if they prefer. Walker prescription was written Wednesday - will check with my office about the status of delivery. I did discuss w patients wife post op that someone would call her to schedule delivery of DME.

## 2020-07-31 NOTE — ED TRIAGE NOTES
Pt presents to ED with c/o right hand pain. Pt states he had slight pain on yesterday and a brace was placed by his surgeon after his ankle surgery. Pt states hand started swelling and pain increased. Denies any recent injuries. Reports history of gout.

## 2020-07-31 NOTE — TELEPHONE ENCOUNTER
Called patient regarding concerns about pain and request for call back. Left voicemail with instructions and requested call back in clinic if he had further questions

## 2020-08-07 NOTE — ADDENDUM NOTE
Addendum  created 08/07/20 1700 by Hola Salmeron MD    Clinical Note Signed, Intraprocedure Blocks edited

## 2020-08-13 ENCOUNTER — OFFICE VISIT (OUTPATIENT)
Dept: ORTHOPEDICS | Facility: CLINIC | Age: 50
End: 2020-08-13
Payer: COMMERCIAL

## 2020-08-13 VITALS
BODY MASS INDEX: 31.83 KG/M2 | HEIGHT: 68 IN | DIASTOLIC BLOOD PRESSURE: 72 MMHG | RESPIRATION RATE: 18 BRPM | OXYGEN SATURATION: 98 % | WEIGHT: 210 LBS | HEART RATE: 86 BPM | SYSTOLIC BLOOD PRESSURE: 142 MMHG

## 2020-08-13 DIAGNOSIS — M25.531 WRIST PAIN, RIGHT: ICD-10-CM

## 2020-08-13 DIAGNOSIS — S82.841D CLOSED BIMALLEOLAR FRACTURE OF RIGHT ANKLE WITH ROUTINE HEALING, SUBSEQUENT ENCOUNTER: Primary | ICD-10-CM

## 2020-08-13 PROCEDURE — 99024 POSTOP FOLLOW-UP VISIT: CPT | Mod: S$GLB,,, | Performed by: PHYSICIAN ASSISTANT

## 2020-08-13 PROCEDURE — 99999 PR PBB SHADOW E&M-EST. PATIENT-LVL IV: ICD-10-PCS | Mod: PBBFAC,,, | Performed by: PHYSICIAN ASSISTANT

## 2020-08-13 PROCEDURE — 99999 PR PBB SHADOW E&M-EST. PATIENT-LVL IV: CPT | Mod: PBBFAC,,, | Performed by: PHYSICIAN ASSISTANT

## 2020-08-13 PROCEDURE — 99024 PR POST-OP FOLLOW-UP VISIT: ICD-10-PCS | Mod: S$GLB,,, | Performed by: PHYSICIAN ASSISTANT

## 2020-08-13 RX ORDER — IBUPROFEN 800 MG/1
800 TABLET ORAL 3 TIMES DAILY
Qty: 90 TABLET | Refills: 0 | Status: SHIPPED | OUTPATIENT
Start: 2020-08-13 | End: 2020-09-12

## 2020-08-13 RX ORDER — HYDROCODONE BITARTRATE AND ACETAMINOPHEN 5; 325 MG/1; MG/1
1 TABLET ORAL EVERY 6 HOURS PRN
Qty: 20 TABLET | Refills: 0 | Status: SHIPPED | OUTPATIENT
Start: 2020-08-13 | End: 2021-02-01

## 2020-08-13 NOTE — PROGRESS NOTES
First Postoperative Visit    History of Present Illness:   Tommy Lamar .   is s/p ORIF right bimalleolar ankle fracture  who comes to the office for 2 weeks post op evaluation (DOS 7/29/2020). His pain is well controlled and the patient is taking percocet occasionally at night.  He is taking ibuprofen during the day which controls his pain.  He is here today in his splint.  He has been compliant with weight bearing restrictions and elevation instructions.  He denies fevers or chills.   He had a fall today in the parking lot while he was coming in for his visit.  He is having some pain along the lateral aspect of his wrist which was pre-existing due to recent gout flare.  He reports a history of gout in his wrist in the past.  The wrist pain has improved and he is wearing a soft brace.    Physical Examination:  He is alert, awake and oriented to time, place and person. He does not appear to be in any distress, and denies any constitutional symptoms. Examination of the left ankle demonstrates healing incision with no erythema or drainage.  There is mild swelling of the right foot    Right wrist shows no swelling or effusion  FROM wrist and digits  TTP along ulnar styloid, TFCC  2+ RP    IMAGING:  X-rays of the right ankle, personally reviewed by me, demonstrate bimalleolar ankle fracture with satisfactory hardware position, mortise reduced.     X-rays of the right wrist show lucency over the ulnar styloid indicative of possible inflammatory arthropathy.  No fracture or dislocation.    Assessment/Plan:  1. 2 weeks s/p ORIF right bimalleolar ankle fracture  2. Sutures were removed today  3. He is to remain NWB.  He was placed in CAM boot today.  Called DME supply- walker is to be delivered tomorrow.  4. The patient was given refill for narcotic pain medications and was told that to wean off these medications.  Rx ibuprofen today also- he never picked up initial prescription  5. Ankle range of motion exercises as  shown in the office today  6. Wrist pain improving- likely gout flare.  If pain persists will obtain MRI for further evaluation.  7. Follow up in 4 weeks weeks with Dr. Workman.  He will have follow up x-rays done    All questions were answered in detail. The patient is in full agreement with the treatment plan and will proceed accordingly.

## 2020-08-14 RX ORDER — ONDANSETRON 8 MG/1
8 TABLET, ORALLY DISINTEGRATING ORAL EVERY 8 HOURS PRN
Qty: 20 TABLET | Refills: 0 | Status: SHIPPED | OUTPATIENT
Start: 2020-08-14 | End: 2020-09-13

## 2020-09-04 DIAGNOSIS — Z11.59 NEED FOR HEPATITIS C SCREENING TEST: ICD-10-CM

## 2020-09-05 ENCOUNTER — NURSE TRIAGE (OUTPATIENT)
Dept: ADMINISTRATIVE | Facility: CLINIC | Age: 50
End: 2020-09-05

## 2020-09-11 ENCOUNTER — PATIENT OUTREACH (OUTPATIENT)
Dept: ADMINISTRATIVE | Facility: OTHER | Age: 50
End: 2020-09-11

## 2020-09-11 DIAGNOSIS — M25.571 RIGHT ANKLE PAIN, UNSPECIFIED CHRONICITY: Primary | ICD-10-CM

## 2020-09-14 ENCOUNTER — APPOINTMENT (OUTPATIENT)
Dept: RADIOLOGY | Facility: HOSPITAL | Age: 50
End: 2020-09-14
Attending: ORTHOPAEDIC SURGERY
Payer: COMMERCIAL

## 2020-09-14 ENCOUNTER — OFFICE VISIT (OUTPATIENT)
Dept: ORTHOPEDICS | Facility: CLINIC | Age: 50
End: 2020-09-14
Payer: COMMERCIAL

## 2020-09-14 VITALS
DIASTOLIC BLOOD PRESSURE: 80 MMHG | HEART RATE: 94 BPM | RESPIRATION RATE: 18 BRPM | WEIGHT: 210 LBS | HEIGHT: 68 IN | OXYGEN SATURATION: 97 % | SYSTOLIC BLOOD PRESSURE: 132 MMHG | BODY MASS INDEX: 31.83 KG/M2

## 2020-09-14 DIAGNOSIS — S82.841D CLOSED BIMALLEOLAR FRACTURE OF RIGHT ANKLE WITH ROUTINE HEALING, SUBSEQUENT ENCOUNTER: ICD-10-CM

## 2020-09-14 DIAGNOSIS — M25.571 RIGHT ANKLE PAIN, UNSPECIFIED CHRONICITY: ICD-10-CM

## 2020-09-14 DIAGNOSIS — M25.571 RIGHT ANKLE PAIN, UNSPECIFIED CHRONICITY: Primary | ICD-10-CM

## 2020-09-14 DIAGNOSIS — M25.531 WRIST PAIN, RIGHT: ICD-10-CM

## 2020-09-14 PROCEDURE — 73610 X-RAY EXAM OF ANKLE: CPT | Mod: 26,RT,, | Performed by: RADIOLOGY

## 2020-09-14 PROCEDURE — 99024 POSTOP FOLLOW-UP VISIT: CPT | Mod: S$GLB,,, | Performed by: ORTHOPAEDIC SURGERY

## 2020-09-14 PROCEDURE — 99999 PR PBB SHADOW E&M-EST. PATIENT-LVL IV: CPT | Mod: PBBFAC,,, | Performed by: ORTHOPAEDIC SURGERY

## 2020-09-14 PROCEDURE — 99999 PR PBB SHADOW E&M-EST. PATIENT-LVL IV: ICD-10-PCS | Mod: PBBFAC,,, | Performed by: ORTHOPAEDIC SURGERY

## 2020-09-14 PROCEDURE — 73610 X-RAY EXAM OF ANKLE: CPT | Mod: TC,FY,PN,RT

## 2020-09-14 PROCEDURE — 73610 XR ANKLE COMPLETE 3 VIEW RIGHT: ICD-10-PCS | Mod: 26,RT,, | Performed by: RADIOLOGY

## 2020-09-14 PROCEDURE — 99024 PR POST-OP FOLLOW-UP VISIT: ICD-10-PCS | Mod: S$GLB,,, | Performed by: ORTHOPAEDIC SURGERY

## 2020-09-14 RX ORDER — DICLOFENAC SODIUM 50 MG/1
50 TABLET, DELAYED RELEASE ORAL 2 TIMES DAILY
Qty: 60 TABLET | Refills: 0 | Status: SHIPPED | OUTPATIENT
Start: 2020-09-14 | End: 2020-11-06 | Stop reason: SDUPTHER

## 2020-09-14 NOTE — PROGRESS NOTES
Postoperative Visit    History of Present Illness:   Tommy is 6 weeks s/p ORIF R medial and lateral mal (DOS-7/29/20)  He is doing well -- pain is well controlled and He is not continuing to take narcotic pain medications   He is compliant with activity restrictions   Has been doing ROM   Still having wrist pain which limits his ability to ambulate  Denies fevers, chills, constitutional symptoms     Physical Examination:    NAD  right lower extremity:  Incisions over the ankle  well healed   Non tender over medial and lateral mal, no pain w percussion   No erythema, drainageor other signs of infection. Appropriate post operative swelling is present  DF to neutral   Ltsi s/s/sp/dp/t  + ehl/fhl/ta/gs  2+ DP    Imaging: 3 views R ankle: no displacement of fracture. Minimal callus present at posterior medial mal fracture site       Assessment/Plan:  50 y.o. male  6 weeks s/p ORIF R medial and lateral mal (DOS-7/29/20)    Plan  1. Offered MRI wrist, he declined  2. PT for ankle and wrist  3. Advance WB in boot as tolerated   4. RTC 6 weeks    All questions were answered in detail. The patient is in full agreement with the treatment plan and will proceed accordingly.

## 2020-09-21 ENCOUNTER — CLINICAL SUPPORT (OUTPATIENT)
Dept: REHABILITATION | Facility: HOSPITAL | Age: 50
End: 2020-09-21
Attending: ORTHOPAEDIC SURGERY
Payer: COMMERCIAL

## 2020-09-21 DIAGNOSIS — M25.671 DECREASED RANGE OF MOTION OF RIGHT ANKLE: ICD-10-CM

## 2020-09-21 DIAGNOSIS — R26.2 DIFFICULTY WALKING: ICD-10-CM

## 2020-09-21 DIAGNOSIS — R68.89 DECREASED STRENGTH, ENDURANCE, AND MOBILITY: ICD-10-CM

## 2020-09-21 DIAGNOSIS — M25.571 ACUTE RIGHT ANKLE PAIN: ICD-10-CM

## 2020-09-21 DIAGNOSIS — R53.1 DECREASED STRENGTH, ENDURANCE, AND MOBILITY: ICD-10-CM

## 2020-09-21 DIAGNOSIS — Z74.09 DECREASED STRENGTH, ENDURANCE, AND MOBILITY: ICD-10-CM

## 2020-09-21 DIAGNOSIS — M25.531 WRIST PAIN, RIGHT: ICD-10-CM

## 2020-09-21 DIAGNOSIS — S82.841D CLOSED BIMALLEOLAR FRACTURE OF RIGHT ANKLE WITH ROUTINE HEALING, SUBSEQUENT ENCOUNTER: ICD-10-CM

## 2020-09-21 PROCEDURE — 97162 PT EVAL MOD COMPLEX 30 MIN: CPT | Mod: PN

## 2020-09-21 PROCEDURE — 97110 THERAPEUTIC EXERCISES: CPT | Mod: PN

## 2020-09-21 NOTE — PROGRESS NOTES
Physical Therapy Daily Treatment Note     Name: Tommy Lamar Sr.  Clinic Number: [id number]    Therapy Diagnosis:   Encounter Diagnoses   Name Primary?    Acute right ankle pain     Decreased range of motion of right ankle     Decreased strength, endurance, and mobility     Difficulty walking      Physician: Christy Workman MD    Visit Date: 2020    Physician Orders: PT Eval and Treat   Medical Diagnosis from Referral:   Closed bimalleolar fracture of right ankle with routine healing, subsequent encounter   M25.531 (ICD-10-CM) - Wrist pain, right   Evaluation Date: 2020  Authorization Period Expiration: 2020  Plan of Care Expiration: 2020  Visit # / Visits authorized:     Time In: 8:55a  Time Out: 9:44a  Total Billable Time: 49 minutes    Precautions: Standard    Subjective     Pt reports: he feels like the swelling went down since yesterday. He was doing some exercises for his wrist and it was pretty sore when he went to bed last night but it feels better today and still wants to just work on his ankle right now.  He was compliant with home exercise program.  Response to previous treatment: good  Functional change: none     Pain: 4/10 (right wrist) & 1/10 (right ankle    Objective     Tommy received therapeutic exercises to develop strength, endurance, ROM, flexibility, posture and core stabilization for 41 minutes includin way ankle AROM/PROM x30 ea   4 way ankle isometrics x20, 5 sec hold - manual assistance   Toe scrunches x30   Moody  x2 min  Sidelying eversion/inversion x30 ea   Towel windshield wipers x20   Ankle circles x20   Long sitting gastroc stretch [id number]   LAQ, 5# 3x10  Seated toe raises x10     Next session: seated rockerboard PF/DF and INV/EV, independent isometrics     Tommy received the following manual therapy techniques: Soft tissue Mobilization were applied to the: R ankle/foot for 00 minutes, including:    Tommy participated in neuromuscular  re-education activities to improve: Balance, Coordination, Sense, Proprioception and Posture for 8 minutes. The following activities were included:  Weight shifting as tolerated in // bars with boot - RLE anterior/posterior; side shifting    Tommy participated in dynamic functional therapeutic activities to improve functional performance for 00 minutes, including:    Tommy participated in gait training to improve functional mobility and safety for 00  minutes, including:    Home Exercises Provided and Patient Education Provided     Education provided:   - continue with exercises at home   - WBAT - instructed to put his foot down more frequently instead of holding it up when walking     Written Home Exercises Provided: Patient instructed to cont prior HEP.  Exercises were reviewed and Tommy was able to demonstrate them prior to the end of the session.  Tommy demonstrated good  understanding of the education provided.     See EMR under Patient Instructions for exercises provided 9/21/2020.    Assessment     Pt tolerated therapy session well. He presented today NWB. He was instructed on WBAT and trying to place his foot down as he is able to even without full weight through it. He had good understanding and will attempt to be more compliant with this. He demonstrates most difficulty and mild increase in pain when performing inversion in any position. He demonstrates tightness and decreased ROM in all directions but especially with plantarflexion. Pt had good tolerance to isometric exercises - plan to give this to him for HEP next session. He had improved toe flexion/extension today due to decreased swelling and improved motor control. Pt tolerated weight shifting in // bars, but had most difficulty when RLE was in posterior position. This had mild increase in pain to heel and posterolateral ankle. Pt is appropriate to continue with therapy at this time and progress towards established goals.   Tommy is progressing well  towards his goals.   Pt prognosis is Good.     Pt will continue to benefit from skilled outpatient physical therapy to address the deficits listed in the problem list box on initial evaluation, provide pt/family education and to maximize pt's level of independence in the home and community environment.   Pt's spiritual, cultural and educational needs considered and pt agreeable to plan of care and goals.     Anticipated barriers to physical therapy: none    Goals:   Short Term GOALS: 6 weeks. Pt agrees with goals set. Ongoing, in progress   1. Patient demonstrates independence with HEP.   2. Patient demonstrates independence with Postural Awareness.   3. Patient demonstrates improved R wrist motion by 10 degrees in all directions to improve gripping and manipulating objects.   4. Patient demonstrates increased L ankle ROM to be symmetrical with R ankle ROM in all directions to improve tolerance to functional activities pain free.    5. Patient demonstrates ability to walk 500 feet with no significant increase in pain to improve independence with household mobility.      Long Term GOALS: 12 weeks. Pt agrees with goals set. Ongoing, in progress   1. Patient demonstrates increased R wrist mobility to be symmetrical to L wrist mobility to improve tolerance to functional activities pain free.   2. Patient demonstrates increased strength BLE's to 4+/5 or greater to improve tolerance to functional activities pain free.   3. Patient demonstrates improved overall function per FOTO Ankle Survey to 35% Limitation or less.   4. Patient demonstrates ability to walk 1 mile with no discomfort or gait abnormalities to improve independence with community mobility.   5. Patient will return to work with minimal to no modifications required to perform job independently.     Plan     Plan of care Certification: 9/21/2020 to 12/21/2020.    Isometrics, WBAT standing exercises, improving ankle ROM     Kortney Gaines, PT   09/22/2020

## 2020-09-21 NOTE — PLAN OF CARE
Physical Therapy Initial Evaluation     Name: Tommy Lamar Sr.  Clinic Number: [id number]    Therapy Diagnosis:   Encounter Diagnoses   Name Primary?    Closed bimalleolar fracture of right ankle with routine healing, subsequent encounter     Wrist pain, right     Acute right ankle pain     Decreased range of motion of right ankle     Decreased strength, endurance, and mobility     Difficulty walking      Physician: Christy Workman MD    Physician Orders: PT Eval and Treat   Medical Diagnosis from Referral:   Closed bimalleolar fracture of right ankle with routine healing, subsequent encounter   M25.531 (ICD-10-CM) - Wrist pain, right   Evaluation Date: 9/21/2020  Authorization Period Expiration: 12/31/2020  Plan of Care Expiration: 12/31/2020  Visit # / Visits authorized: 1/ 1    Time In:10:33A  Time Out: 11:15a  Total Billable Time: 42 minutes    Precautions: Standard, Fall and Weightbearing    Subjective     Medical History:   Past Medical History:   Diagnosis Date    Gout     Hypertension     Kidney stones     Nephrolithiasis     PSVT (paroxysmal supraventricular tachycardia) 12/2019       Surgical History:   Tommy Lamar Sr.  has a past surgical history that includes Hernia repair; Kidney surgery; Tonsillectomy; and Open reduction and internal fixation (ORIF) of injury of ankle (Right, 7/29/2020).    Medications:   Tommy has a current medication list which includes the following prescription(s): allopurinol, aspirin, carbamide peroxide, colchicine, diclofenac, hydrocodone-acetaminophen, levocetirizine, meclizine, metoprolol tartrate, mometasone, omeprazole, ondansetron, oxycodone-acetaminophen, and oxycodone-acetaminophen, and the following Facility-Administered Medications: lactated ringers and lidocaine (pf) 10 mg/ml (1%).    Allergies:   Review of patient's allergies indicates:   Allergen Reactions    Pcn [penicillins] Rash        Date  of onset: 7/22/2020  History of current condition - Tommy reports: He fell of a ladder and hurt his ankle but went to the ER. He had surgery about a week afterwards. He doesn't have much pain. He has been doing a little bit of exercises at home. He doesn't have to use the wheelchair as much anymore. He uses crutches and he has been trying to put weight on it but he is nervous. He gets a shooting pain in posterolateral part of his foot- but this has decreased since the swelling subsided. He wears a walking boot but takes it off when doing exercises unless he is putting pressure on it. He has R wrist pain and it acts up when using crutches. He also has gout in his wrist so he can't wear brace as much as he wants to.     Imaging, x-ray: FINDINGS:  There are again postoperative changes from open reduction internal fixation for fractures of the medial and the lateral malleolus.  Fractures appear to be in near normal anatomic alignment.  Ankle mortise is within normal limits.  Periosteal reaction along the posterior malleolus, likely from a healing posterior malleolar fracture.  Slight increased lucency at the site of the fractures on the medial and the lateral malleolus likely from hyperemia or may be related to mild osteopenic changes.    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   Prior Level of Function: independent   DME owned/used: boot, crutches, wheelchair   Current Level of Function: unable to walk without crutches or boot; can't put full weight through RLE; can't drive; can't work     Pain:  Current 2/10, worst 8/10, best 0/10   Location: right ankles  Description: Aching, Dull, Throbbing and Sharp  Aggravating Factors: Standing and Walking  Easing Factors: rest and elevation    Pts goals: get back to 100% of function; walk and move around without pain; be able to climb ladders and do his  work    Objective     Observation: pt presents with axillary crutches and boot; minimal to no WB through RLE; crutches set at wrong level                R/L:   Wrist Ext: 85% lacking/ WFL    Wrist Flex: 50% lacking/WFL  Wrist RD: 50% lacking/ WFL   Wrist UD: 50% lackig//WFL  Supination/Pronation: WFL/WFL  Elbow extension/flexion: WFL/WFL    Range of Motion: Ankle    Left Right   Dorsiflexion: 10 0   Plantarflexion 40 10   Inversion 25 0   Eversion 15 2     Strength: Ankle    Left Right   Gastrocnemius 5/5 3-/5   Soleus 4+/5 3-/5   Dorsiflexion 5/5 3+/5   Inversion 5/5 3-/5   Eversion 5/5 3-/5       Strength: Knee   Left Right   Knee ext 5/5 4+/5   Knee flex 5/5 4+/5       Strength: Hip    Left Right   Hip Flexion 4+/5 4+/5   Hip Abduction 4/5 4-/5   IR 4+/5 4-/5   ER 4+/5 4-/5   Hip Extension 4+/5 4/5     Joint Mobility: NT on surgical LE   Palpation: mild ttp over malleoli, posterolateral ankle, ball of feet   Sensation: intact to light touch    Edema:  Right: 22 cm   Left: 21.25 cm     Gait: Willard ambulated 100 feet with auxillary crutches and and walking boot.  Level of Assistance: independent  Patient displays unsteady gait, decreased step length and antalgic gait.   Balance: Maintains L SLS 10 seconds with fair balance strategies; NT on R     Functional Limitations Reports - G Codes  Category: mobility  Tool: FOTO Ankle Survey  Score: 70% Limitation   Goal/ : 35% Limitation    TREATMENT     Treatment Time In: 10:50a  Treatment Time Out: 11:15a  Total Treatment time separate from Evaluation: 25 minutes    Tommy received therapeutic exercises to develop strength, endurance, ROM, flexibility, posture and core stabilization for 25 minutes including:    Hip 4 way on RLE x10 ea   Toe scrunches on towel x10  Windshield wipers on towel x10 ea  Wrist circles x10   Ankle circles x10    strength with green foam x10   Ankle pumps x10   gastroc stretch with towel - long sitting x30 sec       Home Exercises and  Patient Education Provided    Education provided:   - WBAT definition   - progression of rehab   - reason for swelling   - crutch height     Written Home Exercises Provided: yes.  Exercises were reviewed and Tommy was able to demonstrate them prior to the end of the session.  Tommy demonstrated good  understanding of the education provided.     See EMR under Patient Instructions for exercises provided 9/21/2020.    ASSESSMENT     Tommy is a 50 y.o. male referred to outpatient Physical Therapy with a medical diagnosis of closed bimalleolar fracture of right ankle with routine healing, subsequent encounter and wrist pain, right. Pt presents s/p ORIF to right ankle following a bimalleolar fracture. He is POW 6-7 and is WBAT, walking with a boot and axillary crutches as per doctor instructions. Upon examination, pt demonstrates R ankle pain, R ankle/foot swelling, decreased tolerance to weight bearing through RLE, decreased R ankle ROM, decreased LE strength, soft tissue dysfunction, motor control impairments, balance deficits, impaired joint mobility, and decreased tolerance to functional activities. Pt also has R wrist pain that he wants to focus on after he starts getting better with his ankle rehabilitation; therefore, a quick screen of his R wrist was performed today. He demonstrates decreased R wrist ROM and R wrist strength. When he wants, a more thorough assessment will be performed. Due to impairments noted, pt is unable to participate in independent ambulation, work duties, ADLs, and recreational activities. Pt was given an updated HEP to improve self-management and start progressing towards his goals. He is appropriate for physical therapy at this time and is expected to benefit from ankle/wrist mobility, LE/UE strengthening, and progressing to closed chain activities and gait training when deemed appropriate and safe.   Pt with good motivation to perform physical activity and responds well to cueing.       Pt  prognosis is Good.   Pt will benefit from skilled outpatient Physical Therapy to address the deficits stated above and in the chart below, provide pt/family education, and to maximize pt's level of independence.     Plan of care discussed with patient: Yes  Pt's spiritual, cultural and educational needs considered and patient is agreeable to the plan of care and goals as stated below:     Anticipated Barriers for therapy: none     Medical Necessity is demonstrated by the following  History  Co-morbidities and personal factors that may impact the plan of care Co-morbidities:   gout, HTN and PSVT, prior abdominal surgery    Personal Factors:   no deficits     moderate   Examination  Body Structures and Functions, activity limitations and participation restrictions that may impact the plan of care Body Regions:   lower extremities  upper extremities    Body Systems:    gross symmetry  ROM  strength  gross coordinated movement  balance  gait  transfers  transitions  motor control  motor learning  edema  scar formation    Participation Restrictions:   Maximal     Activity limitations:   Learning and applying knowledge  no deficits    General Tasks and Commands  no deficits    Communication  no deficits    Mobility  lifting and carrying objects  fine hand use (grasping/picking up)  walking  moving around using equipment (WC)  driving (bike, car, motorcycle)    Self care  washing oneself (bathing, drying, washing hands)  dressing    Domestic Life  shopping  cooking  doing house work (cleaning house, washing dishes, laundry)  assisting others    Interactions/Relationships  No deficits    Life Areas  No deficits    Community and Social Life  No deficits         moderate   Clinical Presentation evolving clinical presentation with changing clinical characteristics moderate   Decision Making/ Complexity Score: moderate     Pt's spiritual, cultural and educational needs considered and pt agreeable to plan of care and goals as  stated below:       Short Term GOALS: 6 weeks. Pt agrees with goals set.  1. Patient demonstrates independence with HEP.   2. Patient demonstrates independence with Postural Awareness.   3. Patient demonstrates improved R wrist motion by 10 degrees in all directions to improve gripping and manipulating objects.   4. Patient demonstrates increased L ankle ROM to be symmetrical with R ankle ROM in all directions to improve tolerance to functional activities pain free.    5. Patient demonstrates ability to walk 500 feet with no significant increase in pain to improve independence with household mobility.     Long Term GOALS: 12 weeks. Pt agrees with goals set.  1. Patient demonstrates increased R wrist mobility to be symmetrical to L wrist mobility to improve tolerance to functional activities pain free.   2. Patient demonstrates increased strength BLE's to 4+/5 or greater to improve tolerance to functional activities pain free.   3. Patient demonstrates improved overall function per FOTO Ankle Survey to 35% Limitation or less.   4. Patient demonstrates ability to walk 1 mile with no discomfort or gait abnormalities to improve independence with community mobility.   5. Patient will return to work with minimal to no modifications required to perform job independently.     PLAN     Plan of care Certification: 9/21/2020 to 12/21/2020.    Outpatient Physical Therapy 1 times weekly for 12 weeks to include the following interventions: Gait Training, Manual Therapy, Moist Heat/ Ice, Neuromuscular Re-ed, Patient Education, Self Care, Therapeutic Activites and Therapeutic Exercise.  Pt may be seen by PTA as part of the rehabilitation team.     Kortney Gaines, PT  9/21/2020    I have seen the patient, reviewed the therapist's plan of care, and I agree with the plan of care.      I certify the need for these services furnished under this plan of treatment and while under my care.     ___________________ ________  Physician/Referring Practitioner            ___________________________ Date of Signature

## 2020-09-22 ENCOUNTER — CLINICAL SUPPORT (OUTPATIENT)
Dept: REHABILITATION | Facility: HOSPITAL | Age: 50
End: 2020-09-22
Attending: ORTHOPAEDIC SURGERY
Payer: COMMERCIAL

## 2020-09-22 DIAGNOSIS — R68.89 DECREASED STRENGTH, ENDURANCE, AND MOBILITY: ICD-10-CM

## 2020-09-22 DIAGNOSIS — M25.571 ACUTE RIGHT ANKLE PAIN: ICD-10-CM

## 2020-09-22 DIAGNOSIS — R26.2 DIFFICULTY WALKING: ICD-10-CM

## 2020-09-22 DIAGNOSIS — M25.671 DECREASED RANGE OF MOTION OF RIGHT ANKLE: ICD-10-CM

## 2020-09-22 DIAGNOSIS — R53.1 DECREASED STRENGTH, ENDURANCE, AND MOBILITY: ICD-10-CM

## 2020-09-22 DIAGNOSIS — Z74.09 DECREASED STRENGTH, ENDURANCE, AND MOBILITY: ICD-10-CM

## 2020-09-22 PROCEDURE — 97110 THERAPEUTIC EXERCISES: CPT | Mod: PN

## 2020-09-22 PROCEDURE — 97112 NEUROMUSCULAR REEDUCATION: CPT | Mod: PN

## 2020-09-23 DIAGNOSIS — M10.072 ACUTE IDIOPATHIC GOUT INVOLVING TOE OF LEFT FOOT: ICD-10-CM

## 2020-09-23 RX ORDER — COLCHICINE 0.6 MG/1
TABLET ORAL
Qty: 30 TABLET | Refills: 0 | Status: SHIPPED | OUTPATIENT
Start: 2020-09-23 | End: 2020-09-23 | Stop reason: SDUPTHER

## 2020-09-23 RX ORDER — COLCHICINE 0.6 MG/1
TABLET ORAL
Qty: 30 TABLET | Refills: 0 | Status: SHIPPED | OUTPATIENT
Start: 2020-09-23 | End: 2020-12-28

## 2020-09-23 NOTE — TELEPHONE ENCOUNTER
Last Office Visit Info:   The patient's last visit with Negro Bell MD was on 9/18/2018.    The patient's last visit in current department was on 7/16/2020.        Last CBC Results:   Lab Results   Component Value Date    WBC 6.21 07/27/2020    HGB 13.0 (L) 07/27/2020    HCT 37.4 (L) 07/27/2020     07/27/2020       Last CMP Results  Lab Results   Component Value Date     07/27/2020    K 3.9 07/27/2020    CL 99 07/27/2020    CO2 30 (H) 07/27/2020    BUN 12 07/27/2020    CREATININE 1.3 07/27/2020    CALCIUM 9.6 07/27/2020    ALBUMIN 4.3 06/30/2020    AST 16 06/30/2020    ALT 21 06/30/2020       Last Lipids  Lab Results   Component Value Date    CHOL 158 10/21/2019    TRIG 170 (H) 10/21/2019    HDL 40 10/21/2019    LDLCALC 84.0 10/21/2019       Last A1C  Lab Results   Component Value Date    HGBA1C 5.4 10/21/2019       Last TSH  Lab Results   Component Value Date    TSH 0.992 10/21/2019         Current Med Refills  Medication List with Changes/Refills   Current Medications    ALLOPURINOL (ZYLOPRIM) 50 MG TAB TABLET    Take 100 mg by mouth.       Start Date: --        End Date: --    ASPIRIN (ECOTRIN) 81 MG EC TABLET    Take 1 tablet (81 mg total) by mouth once daily.       Start Date: 4/24/2019 End Date: --    CARBAMIDE PEROXIDE (DEBROX) 6.5 % OTIC SOLUTION    Place 5 drops into both ears as needed (Ear wax removal).       Start Date: 7/1/2020  End Date: --    COLCHICINE (COLCRYS) 0.6 MG TABLET    TAKE 2 TABLETS BY MOUTH AT GOUT FLARE UP & ONE TABLET ONE HOUR LATER REPEAT IN 24 HOURS IF NEEDED       Start Date: 7/31/2020 End Date: --    DICLOFENAC (VOLTAREN) 50 MG EC TABLET    Take 1 tablet (50 mg total) by mouth 2 (two) times daily.       Start Date: 9/14/2020 End Date: --    HYDROCODONE-ACETAMINOPHEN (NORCO) 5-325 MG PER TABLET    Take 1 tablet by mouth every 6 (six) hours as needed for Pain.       Start Date: 8/13/2020 End Date: --    LEVOCETIRIZINE (XYZAL) 5 MG TABLET    Take 1 tablet (5 mg total) by  mouth every evening.       Start Date: 7/16/2020 End Date: 7/16/2021    MECLIZINE (ANTIVERT) 12.5 MG TABLET    Take 1 tablet (12.5 mg total) by mouth 3 (three) times daily as needed for Dizziness.       Start Date: 7/17/2020 End Date: --    METOPROLOL TARTRATE (LOPRESSOR) 25 MG TABLET    TAKE 1/2 TABLET BY MOUTH EVERY EVENING       Start Date: 7/7/2020  End Date: --    MOMETASONE (NASONEX) 50 MCG/ACTUATION NASAL SPRAY    2 sprays by Nasal route once daily.       Start Date: 7/16/2020 End Date: --    OMEPRAZOLE (PRILOSEC) 40 MG CAPSULE    TAKE ONE CAPSULE BY MOUTH DAILY *DO NOT CHEW/CRUSH/GRIND*       Start Date: 5/18/2020 End Date: --    ONDANSETRON (ZOFRAN-ODT) 4 MG TBDL    Take 1 tablet (4 mg total) by mouth every 8 (eight) hours as needed (nausea).       Start Date: 7/29/2020 End Date: --    OXYCODONE-ACETAMINOPHEN (PERCOCET)  MG PER TABLET    Take 1 tablet by mouth every 6 (six) hours as needed for Pain.       Start Date: 7/29/2020 End Date: --    OXYCODONE-ACETAMINOPHEN (PERCOCET) 5-325 MG PER TABLET    Take 1 tablet by mouth every 4 (four) hours as needed for Pain.       Start Date: 7/22/2020 End Date: --

## 2020-09-23 NOTE — TELEPHONE ENCOUNTER
----- Message from Tracey Espinal sent at 9/23/2020 10:09 AM CDT -----  Regarding: pt  Type: RX Refill Request    Who Called: call pt in regards to prednisone.    Have you contacted your pharmacy:    Refill or New Rxcolchicine (COLCRYS) 0.6 mg tablet        RX Name and Strength:    How is the patient currently taking it? (ex. 1XDay):    Is this a 30 day or 90 day RX:    Preferred Pharmacy with phone number:  [facility] - [place] - [address]  [address]  [place] [address]  Phone: [phone] Fax: [phone]        Local or Mail Order:    Ordering Provider:    Would the patient rather a call back or a response via [facility]? call    Best Call Back Number:[phone] (home)       Additional Information:

## 2020-09-25 NOTE — PROGRESS NOTES
Physical Therapy Daily Treatment Note     Name: Tommy Lamar Sr.  Clinic Number: [id number]    Therapy Diagnosis:   Encounter Diagnoses   Name Primary?    Acute right ankle pain     Decreased range of motion of right ankle     Decreased strength, endurance, and mobility     Difficulty walking      Physician: Christy Workman MD    Visit Date: 9/28/2020    Physician Orders: PT Eval and Treat   Medical Diagnosis from Referral:   Closed bimalleolar fracture of right ankle with routine healing, subsequent encounter   M25.531 (ICD-10-CM) - Wrist pain, right   Evaluation Date: 9/21/2020  Authorization Period Expiration: 12/31/2020  Plan of Care Expiration: 12/31/2020  Visit # / Visits authorized: 1/30 (+2)     Time In: 9:42a  Time Out: 10:32a  Total Billable Time:  50 minutes    Precautions: Standard  POW 8-9  Subjective     Pt reports: he has really been working it out and the swelling went down a lot and he can move his foot around a lot more. He still has pain in his wrist but not concerned right now.   He was compliant with home exercise program.  Response to previous treatment: good  Functional change: none     Pain: 4/10 (right wrist) & 1/10 (right ankle    Objective     Tommy received therapeutic exercises to develop strength, endurance, ROM, flexibility, posture and core stabilization for 42 minutes including:    +Seated rockerboard PF/DF and INV/EV x2 min ea   4 way ankle AROM/PROM x30 ea   4 way ankle isometrics x20, 5 sec hold - independent   Toe scrunches x30   [place]  x2 min  Sidelying eversion/inversion x30 ea   Towel windshield wipers x20   Ankle circles x20   Long sitting gastroc stretch [id number]   LAQ, 5# 3x10  Seated toe raises x10      Tommy received the following manual therapy techniques: Soft tissue Mobilization were applied to the: R ankle/foot for 00 minutes, including:    Tommy participated in neuromuscular re-education activities to improve: Balance, Coordination, Sense, Proprioception  and Posture for 8 minutes. The following activities were included:  Weight shifting as tolerated in // bars with boot - RLE anterior/posterior; side shifting x 2 min ea     Tommy participated in dynamic functional therapeutic activities to improve functional performance for 00 minutes, including:    Tommy participated in gait training to improve functional mobility and safety for 00  minutes, including:    Home Exercises Provided and Patient Education Provided     Education provided:   - continue with exercises at home   - WBAT - instructed to put his foot down more frequently instead of holding it up when walking     Written Home Exercises Provided: Patient instructed to cont prior HEP.  Exercises were reviewed and Tommy was able to demonstrate them prior to the end of the session.  Tommy demonstrated good  understanding of the education provided.     See EMR under Patient Instructions for exercises provided 9/21/2020.    Assessment     Pt tolerated therapy session well. Pt is s/p 8 weeks. Pt presented today with improved confidence and WBAT walking with crutches. He continues with most difficulty and tingling sensation when weight shifting in parallel bars. Most of his pain is inferior to medial malleolus. Instructed to work to pain tolerance but not past it. His swelling has reduced substantially since last session. He required cueing for improved isometric technique to hold each position for a longer period of time as long as this is not increasing his pain. He tends to perform toe flexion with all movements. Pt is appropriate to continue with therapy at this time and continue working towards closed chain exercises and improved ankle mobility in all directions.   Tommy is progressing well towards his goals.   Pt prognosis is Good.     Pt will continue to benefit from skilled outpatient physical therapy to address the deficits listed in the problem list box on initial evaluation, provide pt/family education and to  maximize pt's level of independence in the home and community environment.   Pt's spiritual, cultural and educational needs considered and pt agreeable to plan of care and goals.     Anticipated barriers to physical therapy: none    Goals:   Short Term GOALS: 6 weeks. Pt agrees with goals set. Ongoing, in progress   1. Patient demonstrates independence with HEP.   2. Patient demonstrates independence with Postural Awareness.   3. Patient demonstrates improved R wrist motion by 10 degrees in all directions to improve gripping and manipulating objects.   4. Patient demonstrates increased L ankle ROM to be symmetrical with R ankle ROM in all directions to improve tolerance to functional activities pain free.    5. Patient demonstrates ability to walk 500 feet with no significant increase in pain to improve independence with household mobility.      Long Term GOALS: 12 weeks. Pt agrees with goals set. Ongoing, in progress   1. Patient demonstrates increased R wrist mobility to be symmetrical to L wrist mobility to improve tolerance to functional activities pain free.   2. Patient demonstrates increased strength BLE's to 4+/5 or greater to improve tolerance to functional activities pain free.   3. Patient demonstrates improved overall function per FOTO Ankle Survey to 35% Limitation or less.   4. Patient demonstrates ability to walk 1 mile with no discomfort or gait abnormalities to improve independence with community mobility.   5. Patient will return to work with minimal to no modifications required to perform job independently.     Plan     Plan of care Certification: 9/21/2020 to 12/21/2020.    Isometrics, WBAT standing exercises, improving ankle ROM     Kortney Gaines, PT   09/28/2020    Julia Cates, PTA  09/28/2020

## 2020-09-28 ENCOUNTER — CLINICAL SUPPORT (OUTPATIENT)
Dept: REHABILITATION | Facility: HOSPITAL | Age: 50
End: 2020-09-28
Attending: ORTHOPAEDIC SURGERY
Payer: COMMERCIAL

## 2020-09-28 DIAGNOSIS — M25.571 ACUTE RIGHT ANKLE PAIN: ICD-10-CM

## 2020-09-28 DIAGNOSIS — M25.671 DECREASED RANGE OF MOTION OF RIGHT ANKLE: ICD-10-CM

## 2020-09-28 DIAGNOSIS — Z74.09 DECREASED STRENGTH, ENDURANCE, AND MOBILITY: ICD-10-CM

## 2020-09-28 DIAGNOSIS — R68.89 DECREASED STRENGTH, ENDURANCE, AND MOBILITY: ICD-10-CM

## 2020-09-28 DIAGNOSIS — R53.1 DECREASED STRENGTH, ENDURANCE, AND MOBILITY: ICD-10-CM

## 2020-09-28 DIAGNOSIS — R26.2 DIFFICULTY WALKING: ICD-10-CM

## 2020-09-28 PROCEDURE — 97110 THERAPEUTIC EXERCISES: CPT | Mod: PN,CQ

## 2020-09-28 PROCEDURE — 97112 NEUROMUSCULAR REEDUCATION: CPT | Mod: PN

## 2020-09-30 NOTE — PROGRESS NOTES
Physical Therapy Daily Treatment Note     Name: Tommy Lamar Sr.  Clinic Number: [id number]    Therapy Diagnosis:   Encounter Diagnoses   Name Primary?    Acute right ankle pain     Decreased range of motion of right ankle     Decreased strength, endurance, and mobility     Difficulty walking      Physician: Christy Workman MD    Visit Date: 10/1/2020    Physician Orders: PT Eval and Treat   Medical Diagnosis from Referral:   Closed bimalleolar fracture of right ankle with routine healing, subsequent encounter   M25.531 (ICD-10-CM) - Wrist pain, right   Evaluation Date: 9/21/2020    PN Due: 10/21/2020  Authorization Period Expiration: 12/31/2020  Plan of Care Expiration: 12/31/2020  Visit # / Visits authorized: 2/30 (+2)     Time In: 9:12a  Time Out: 10:09a  Total Billable Time:  57 minutes    Precautions: Standard and Weightbearing as tolerated  S/P: closed bimalleolar fracture on 7/29/2020:   POW 9 by 9/30/2020  Subjective     Pt reports: his wrist is feeling better and he thinks it is just the gout so he doesn't care to work on it right now. He states that he went to his son's cross country meet the other day and it was swollen a lot afterwards but he did his exercises and it went down.  He was compliant with home exercise program.  Response to previous treatment: good  Functional change: none     Pain: 2/10 (right wrist) & 1/10 (right ankle    Objective     Tommy received therapeutic exercises to develop strength, endurance, ROM, flexibility, posture and core stabilization for 47 minutes including:    Seated rockerboard PF/DF and INV/EV x2 min ea   +Prostretch PF/DF 3x15 sec  4 way ankle AROM/PROM x20 ea   4 way ankle isometrics x15, 10 sec hold  [place] toe scrunches x30  +Mini bridges to tolerance x30  [place]  x2 min  Sidelying eversion/inversion x20 ea   Towel windshield wipers x20   Ankle circles x10   Long sitting gastroc stretch [id number]   LAQ, 5# 3x10  +Sidelying hip abduction 2x10   Seated  toe raises x15    Seated heel raises x15     Tommy received the following manual therapy techniques: Soft tissue Mobilization were applied to the: R ankle/foot for 00 minutes, including:    Tommy participated in neuromuscular re-education activities to improve: Balance, Coordination, Sense, Proprioception and Posture for 10 minutes. The following activities were included:  Weight shifting as tolerated in // bars with boot - RLE anterior/posterior; side shifting x 2 min ea   Static standing on ground in // bars with boot x1 min (NBOS)  Static standing on foam in // bars with boot x1 min (WBOS)     Tommy participated in dynamic functional therapeutic activities to improve functional performance for 00 minutes, including:    oTmmy participated in gait training to improve functional mobility and safety for 00  minutes, including:    Home Exercises Provided and Patient Education Provided     Education provided:   - continue with exercises at home   - gaining mobility - slow controlled motion     Written Home Exercises Provided: Patient instructed to cont prior HEP.  Exercises were reviewed and Tommy was able to demonstrate them prior to the end of the session.  Tommy demonstrated good  understanding of the education provided.     See EMR under Patient Instructions for exercises provided 9/21/2020.    Assessment     Pt tolerated therapy session well. Pt is s/p 9 weeks closed bimalleolar fracture with ORIF. Pt continues with improvements with weight bearing following WBAT restrictions. Progressed to static standing, no UE support on ground and foam. Pt continues with most limited movements in plantarflexion and eversion. Pt had most pain over medial posterior ankle with resisted isometric eversion. Progressed closed chain exercises with bridges. Pt is appropriate to continue with therapy at this time and progress towards established goals.   Tommy is progressing well towards his goals.   Pt prognosis is Good.     Pt will continue  to benefit from skilled outpatient physical therapy to address the deficits listed in the problem list box on initial evaluation, provide pt/family education and to maximize pt's level of independence in the home and community environment.   Pt's spiritual, cultural and educational needs considered and pt agreeable to plan of care and goals.     Anticipated barriers to physical therapy: none    Goals:   Short Term GOALS: 6 weeks. Pt agrees with goals set. Ongoing, in progress   1. Patient demonstrates independence with HEP.   2. Patient demonstrates independence with Postural Awareness.   3. Patient demonstrates improved R wrist motion by 10 degrees in all directions to improve gripping and manipulating objects.   4. Patient demonstrates increased L ankle ROM to be symmetrical with R ankle ROM in all directions to improve tolerance to functional activities pain free.    5. Patient demonstrates ability to walk 500 feet with no significant increase in pain to improve independence with household mobility.      Long Term GOALS: 12 weeks. Pt agrees with goals set. Ongoing, in progress   1. Patient demonstrates increased R wrist mobility to be symmetrical to L wrist mobility to improve tolerance to functional activities pain free.   2. Patient demonstrates increased strength BLE's to 4+/5 or greater to improve tolerance to functional activities pain free.   3. Patient demonstrates improved overall function per FOTO Ankle Survey to 35% Limitation or less.   4. Patient demonstrates ability to walk 1 mile with no discomfort or gait abnormalities to improve independence with community mobility.   5. Patient will return to work with minimal to no modifications required to perform job independently.     Plan     Plan of care Certification: 9/21/2020 to 12/21/2020.    Isometrics, WBAT standing exercises, improving ankle ROM     Kortney Gaines, PT   10/01/2020

## 2020-10-01 ENCOUNTER — CLINICAL SUPPORT (OUTPATIENT)
Dept: REHABILITATION | Facility: HOSPITAL | Age: 50
End: 2020-10-01
Attending: ORTHOPAEDIC SURGERY
Payer: COMMERCIAL

## 2020-10-01 DIAGNOSIS — M25.671 DECREASED RANGE OF MOTION OF RIGHT ANKLE: ICD-10-CM

## 2020-10-01 DIAGNOSIS — Z74.09 DECREASED STRENGTH, ENDURANCE, AND MOBILITY: ICD-10-CM

## 2020-10-01 DIAGNOSIS — M25.571 ACUTE RIGHT ANKLE PAIN: ICD-10-CM

## 2020-10-01 DIAGNOSIS — R53.1 DECREASED STRENGTH, ENDURANCE, AND MOBILITY: ICD-10-CM

## 2020-10-01 DIAGNOSIS — R26.2 DIFFICULTY WALKING: ICD-10-CM

## 2020-10-01 DIAGNOSIS — R68.89 DECREASED STRENGTH, ENDURANCE, AND MOBILITY: ICD-10-CM

## 2020-10-01 PROCEDURE — 97112 NEUROMUSCULAR REEDUCATION: CPT | Mod: PN

## 2020-10-01 PROCEDURE — 97110 THERAPEUTIC EXERCISES: CPT | Mod: PN

## 2020-10-05 ENCOUNTER — CLINICAL SUPPORT (OUTPATIENT)
Dept: REHABILITATION | Facility: HOSPITAL | Age: 50
End: 2020-10-05
Attending: ORTHOPAEDIC SURGERY
Payer: COMMERCIAL

## 2020-10-05 DIAGNOSIS — Z74.09 DECREASED STRENGTH, ENDURANCE, AND MOBILITY: ICD-10-CM

## 2020-10-05 DIAGNOSIS — R68.89 DECREASED STRENGTH, ENDURANCE, AND MOBILITY: ICD-10-CM

## 2020-10-05 DIAGNOSIS — M25.671 DECREASED RANGE OF MOTION OF RIGHT ANKLE: ICD-10-CM

## 2020-10-05 DIAGNOSIS — R26.2 DIFFICULTY WALKING: ICD-10-CM

## 2020-10-05 DIAGNOSIS — R53.1 DECREASED STRENGTH, ENDURANCE, AND MOBILITY: ICD-10-CM

## 2020-10-05 DIAGNOSIS — M25.571 ACUTE RIGHT ANKLE PAIN: ICD-10-CM

## 2020-10-05 PROCEDURE — 97110 THERAPEUTIC EXERCISES: CPT | Mod: PN,CQ

## 2020-10-05 PROCEDURE — 97112 NEUROMUSCULAR REEDUCATION: CPT | Mod: PN,CQ

## 2020-10-05 NOTE — PROGRESS NOTES
Physical Therapy Daily Treatment Note     Name: Tommy Lamar Sr.  Clinic Number: [id number]    Therapy Diagnosis:   Encounter Diagnoses   Name Primary?    Acute right ankle pain     Decreased range of motion of right ankle     Decreased strength, endurance, and mobility     Difficulty walking      Physician: Christy Workman MD    Visit Date: 10/5/2020    Physician Orders: PT Eval and Treat   Medical Diagnosis from Referral:   Closed bimalleolar fracture of right ankle with routine healing, subsequent encounter   M25.531 (ICD-10-CM) - Wrist pain, right   Evaluation Date: 9/21/2020    PN Due: 10/21/2020  Authorization Period Expiration: 12/31/2020  Plan of Care Expiration: 12/31/2020  Visit # / Visits authorized: 4/30 (+2)     Time In: 1130  Time Out: 1230  Total Time: 60 minutes  Total Billable Time:  60 minutes    Precautions: Standard and Weightbearing as tolerated  S/P: closed bimalleolar fracture on 7/29/2020:   POW 9 by 9/30/2020  Subjective     Pt reports: his wrist is feeling better and he has been trying to increase WB to RLE in the green with his crutches. Unable to put on it all the way.    He was compliant with home exercise program.  Response to previous treatment: good  Functional change: none     Pain: 0/10 (right wrist) & 0/10 (right ankle    Objective     Tommy received therapeutic exercises to develop strength, endurance, ROM, flexibility, posture and core stabilization for 50 minutes including:    Seated in Chair:  Seated toe raises x15    Seated heel raises x15   Seated rockerboard PF/DF and INV/EV x2 min ea   Prostretch PF/DF 3x15 sec  [place] toe scrunches x30  [place]  x2 min      Seated EOM:  LAQ, 5# 3x10   Ankle circles x10 each way (CW, CCW)    On Mat:  Sidelying eversion/inversion x20 ea   Sidelying hip abduction 2x10   Mini bridges to tolerance x30  4 way ankle AROM/PROM x20 ea   4 way ankle isometrics x15, 10 sec hold  Long sitting gastroc stretch [id number]     [facility]  wipers x20       Tommy participated in neuromuscular re-education activities to improve: Balance, Coordination, Sense, Proprioception and Posture for 10 minutes. The following activities were included:    2in step under LLE, camboot to RLE  Weight shifting as tolerated in // bars with boot - RLE anterior/posterior; side shifting x 2 min ea   Static standing on ground in // bars with boot x1 min (NBOS)  Static standing on foam in // bars with boot x1 min (WBOS)       Tommy received the following manual therapy techniques: Soft tissue Mobilization were applied to the: R ankle/foot for 00 minutes, including:  Tommy participated in dynamic functional therapeutic activities to improve functional performance for 00 minutes, including:    Tommy participated in gait training to improve functional mobility and safety for 00  minutes, including:    Home Exercises Provided and Patient Education Provided     Education provided:   - continue with exercises at home   - gaining mobility - slow controlled motion     Written Home Exercises Provided: Patient instructed to cont prior HEP.  Exercises were reviewed and Tommy was able to demonstrate them prior to the end of the session.  Tommy demonstrated good  understanding of the education provided.     See EMR under Patient Instructions for exercises provided 9/21/2020.    Assessment     Pt tolerated treatment good today. Entered clinic attempting to improve WB to RLE following WBAT restrictions. Mostly in w/c at home, encouraged ambulation with crutches to improve weight bearing as tolerated, pt verbalized understanding. Attempted ambulation with one crutch to LUE, however, unsuccessful due to pain/fear. Experiences some difficulty with bridges with decreased neuromuscular control noted. Increased hip height difference R side>L in // bars with WB.  Added 2in step to LLE for weigh bearing activities in // bars to increase WB to involved LE. Pt is responding positively to current treatment  plan. ROM has improved since initial evaluation and pt remains compliant with HEP.Pt is appropriate to continue with therapy at this time and progress towards established goals.   Tommy is progressing well towards his goals.   Pt prognosis is Good.     Pt will continue to benefit from skilled outpatient physical therapy to address the deficits listed in the problem list box on initial evaluation, provide pt/family education and to maximize pt's level of independence in the home and community environment.   Pt's spiritual, cultural and educational needs considered and pt agreeable to plan of care and goals.     Anticipated barriers to physical therapy: none    Goals:   Short Term GOALS: 6 weeks. Pt agrees with goals set. Ongoing, in progress   1. Patient demonstrates independence with HEP.   2. Patient demonstrates independence with Postural Awareness.   3. Patient demonstrates improved R wrist motion by 10 degrees in all directions to improve gripping and manipulating objects.   4. Patient demonstrates increased L ankle ROM to be symmetrical with R ankle ROM in all directions to improve tolerance to functional activities pain free.    5. Patient demonstrates ability to walk 500 feet with no significant increase in pain to improve independence with household mobility.      Long Term GOALS: 12 weeks. Pt agrees with goals set. Ongoing, in progress   1. Patient demonstrates increased R wrist mobility to be symmetrical to L wrist mobility to improve tolerance to functional activities pain free.   2. Patient demonstrates increased strength BLE's to 4+/5 or greater to improve tolerance to functional activities pain free.   3. Patient demonstrates improved overall function per FOTO Ankle Survey to 35% Limitation or less.   4. Patient demonstrates ability to walk 1 mile with no discomfort or gait abnormalities to improve independence with community mobility.   5. Patient will return to work with minimal to no modifications  required to perform job independently.     Plan     Plan of care Certification: 9/21/2020 to 12/21/2020.    Isometrics, WBAT standing exercises, improving ankle ROM     Danay Fuentes, PTA   10/05/2020

## 2020-10-07 NOTE — PROGRESS NOTES
Physical Therapy Daily Treatment Note     Name: Tommy Lamar .  Clinic Number: [id number]    Therapy Diagnosis:   No diagnosis found.  Physician: Christy Workman MD    Visit Date: 10/8/2020    Physician Orders: PT Eval and Treat   Medical Diagnosis from Referral:   Closed bimalleolar fracture of right ankle with routine healing, subsequent encounter   M25.531 (ICD-10-CM) - Wrist pain, right   Evaluation Date: 9/21/2020    PN Due: 10/21/2020  Authorization Period Expiration: 12/31/2020  Plan of Care Expiration: 12/31/2020  Visit # / Visits authorized: 5/30 (+2)     Time In: 10:45a  Time Out: 11:35a  Total Time: 50 minutes  Total Billable Time:  50 minutes    Precautions: Standard and Weightbearing as tolerated  S/P: closed bimalleolar fracture on 7/29/2020:   POW 10 by 10/7/2020  Subjective     Pt reports: he is doing good today. Really been working out his ankle. Trying to put more weight through it at home with and without the boot but being careful.     He was compliant with home exercise program.  Response to previous treatment: good  Functional change: none     Pain: 0/10 (right wrist) & 0/10 (right ankle    Objective     Tommy received therapeutic exercises to develop strength, endurance, ROM, flexibility, posture and core stabilization for 34 minutes including:    Seated in Chair:  Seated toe raises 2x10    Seated heel raises 2x10   Seated rockerboard PF/DF and INV/EV x2 min ea   Prostretch PF/DF 3x15 sec  [place] toe scrunches x30   Towel windshield wipers x20     Seated EOM:  LAQ, 5# 3x10   Ankle circles x10 each way (CW, CCW)    On Mat:  Sidelying eversion/inversion x20 ea   Sidelying hip abduction 2x10   Bridges to tolerance x30  4 way ankle PROM x20 ea   INV/EV isometrics x10, 10 sec hold  PF/DF with YTB 3x10   Long sitting gastroc stretch [id number]      Tommy participated in neuromuscular re-education activities to improve: Balance, Coordination, Sense, Proprioception and Posture for 8 minutes. The  following activities were included:  No boot:   Weight shifting as tolerated in // bars - RLE anterior/posterior; side shifting x 2 min ea   Static standing on ground in // bars x1 min (NBOS)  Static standing on foam in // bars x1 min (WBOS)     Tommy received the following manual therapy techniques: Soft tissue Mobilization were applied to the: R ankle/foot for 8 minutes, including:  TC joint mobs - gentle - AP grade 1-2 joint mobs   tibfib gentle grade 1-2 joint mobs     Tommy participated in dynamic functional therapeutic activities to improve functional performance for 00 minutes, including:    Tommy participated in gait training to improve functional mobility and safety for 00  minutes, including:    Home Exercises Provided and Patient Education Provided     Education provided:   - continue with exercises at home   - gaining mobility - slow controlled motion     Written Home Exercises Provided: Patient instructed to cont prior HEP.  Exercises were reviewed and Tommy was able to demonstrate them prior to the end of the session.  Tommy demonstrated good  understanding of the education provided.     See EMR under Patient Instructions for exercises provided 9/21/2020.    Assessment     Pt tolerated treatment good today. Pt instructed to put more weight through boot as he tends to just allow it to touch without accepting weight - cued for improving knee extension through loading and mid stance. Pt performed closed chain exercises without boot on today with fairly good response. Most difficulty is when shifting weight with RLE posterior to LLE. Pt with improved passive mobility today and good response to joint mobilizations. Challenged with resistance through full motion of PF/DF. Pt is appropriate to continue with therapy at this time and progress towards weight bearing fully and improved gait pattern.   Tommy is progressing well towards his goals.   Pt prognosis is Good.     Pt will continue to benefit from skilled  outpatient physical therapy to address the deficits listed in the problem list box on initial evaluation, provide pt/family education and to maximize pt's level of independence in the home and community environment.   Pt's spiritual, cultural and educational needs considered and pt agreeable to plan of care and goals.     Anticipated barriers to physical therapy: none    Goals:   Short Term GOALS: 6 weeks. Pt agrees with goals set. Ongoing, in progress   1. Patient demonstrates independence with HEP.   2. Patient demonstrates independence with Postural Awareness.   3. Patient demonstrates improved R wrist motion by 10 degrees in all directions to improve gripping and manipulating objects.   4. Patient demonstrates increased L ankle ROM to be symmetrical with R ankle ROM in all directions to improve tolerance to functional activities pain free.    5. Patient demonstrates ability to walk 500 feet with no significant increase in pain to improve independence with household mobility.      Long Term GOALS: 12 weeks. Pt agrees with goals set. Ongoing, in progress   1. Patient demonstrates increased R wrist mobility to be symmetrical to L wrist mobility to improve tolerance to functional activities pain free.   2. Patient demonstrates increased strength BLE's to 4+/5 or greater to improve tolerance to functional activities pain free.   3. Patient demonstrates improved overall function per FOTO Ankle Survey to 35% Limitation or less.   4. Patient demonstrates ability to walk 1 mile with no discomfort or gait abnormalities to improve independence with community mobility.   5. Patient will return to work with minimal to no modifications required to perform job independently.     Plan     Plan of care Certification: 9/21/2020 to 12/21/2020.    Isometrics, WBAT standing exercises, improving ankle ROM     Kortney Gaines, PT   10/07/2020

## 2020-10-08 ENCOUNTER — CLINICAL SUPPORT (OUTPATIENT)
Dept: REHABILITATION | Facility: HOSPITAL | Age: 50
End: 2020-10-08
Attending: ORTHOPAEDIC SURGERY
Payer: COMMERCIAL

## 2020-10-08 DIAGNOSIS — Z74.09 DECREASED STRENGTH, ENDURANCE, AND MOBILITY: ICD-10-CM

## 2020-10-08 DIAGNOSIS — M25.571 ACUTE RIGHT ANKLE PAIN: ICD-10-CM

## 2020-10-08 DIAGNOSIS — R68.89 DECREASED STRENGTH, ENDURANCE, AND MOBILITY: ICD-10-CM

## 2020-10-08 DIAGNOSIS — R53.1 DECREASED STRENGTH, ENDURANCE, AND MOBILITY: ICD-10-CM

## 2020-10-08 DIAGNOSIS — M25.671 DECREASED RANGE OF MOTION OF RIGHT ANKLE: ICD-10-CM

## 2020-10-08 DIAGNOSIS — R26.2 DIFFICULTY WALKING: ICD-10-CM

## 2020-10-08 PROCEDURE — 97140 MANUAL THERAPY 1/> REGIONS: CPT | Mod: PN

## 2020-10-08 PROCEDURE — 97112 NEUROMUSCULAR REEDUCATION: CPT | Mod: PN

## 2020-10-08 PROCEDURE — 97110 THERAPEUTIC EXERCISES: CPT | Mod: PN

## 2020-10-09 NOTE — PROGRESS NOTES
Physical Therapy Daily Treatment Note     Name: Tommy Lamar Sr.  Clinic Number: [id number]    Therapy Diagnosis:   Encounter Diagnoses   Name Primary?    Acute right ankle pain     Decreased range of motion of right ankle     Decreased strength, endurance, and mobility     Difficulty walking      Physician: Christy Workman MD    Visit Date: 10/12/2020    Physician Orders: PT Eval and Treat   Medical Diagnosis from Referral:   Closed bimalleolar fracture of right ankle with routine healing, subsequent encounter   M25.531 (ICD-10-CM) - Wrist pain, right   Evaluation Date: 9/21/2020    PN Due: 10/21/2020  Authorization Period Expiration: 12/31/2020  Plan of Care Expiration: 12/31/2020  Visit # / Visits authorized: 6/30 (+2)     Time In: 11:57a  Time Out: 12:50a  Total Time: 53 minutes  Total Billable Time:  53 minutes    Precautions: Standard and Weightbearing as tolerated; fall risk   S/P: closed bimalleolar fracture on 7/29/2020:   POW 10 by 10/7/2020  Subjective     Pt reports: he is doing good today. He fell when he was shaving and he cut his hand but he didn't hurt his wrist or his ankle.      He was compliant with home exercise program.  Response to previous treatment: good  Functional change: none     Pain: 0/10 (right wrist) & 0/10 (right ankle    Objective     Tommy received therapeutic exercises to develop strength, endurance, ROM, flexibility, posture and core stabilization for 33 minutes including:    Seated in Chair:  +Sit to stands with boot on 2x10  Seated toe raises 2x10    Seated heel raises 2x10   Seated rockerboard PF/DF and INV/EV x2 min ea   Prostretch PF/DF 3x15 sec  [place] toe scrunches x30   Towel windshield wipers x20     Seated EOM:  LAQ, 5# 3x10   Ankle circles x10 each way (CW, CCW)    On Mat:  Sidelying eversion/inversion x20 ea   Sidelying hip abduction 2x10   Bridges to tolerance x30  4 way ankle PROM x10 ea   4 way ankle with YTB 3x10   Long sitting gastroc stretch [id number]       Tommy participated in neuromuscular re-education activities to improve: Balance, Coordination, Sense, Proprioception and Posture for 12 minutes. The following activities were included:  No boot:   Weight shifting as tolerated in // bars - RLE anterior/posterior; side shifting x 2 min ea   Static standing on ground in // bars x1 min (NBOS)  Static standing on foam in // bars x1 min (WBOS)   Pre-gait stability and adding more weight to LLE while walking with crutches (with and without boot on) x5 min     Tommy received the following manual therapy techniques: Soft tissue Mobilization were applied to the: R ankle/foot for 8 minutes, including:  TC joint mobs - gentle - AP grade 1-2 joint mobs   tibfib gentle grade 1-2 joint mobs     Tommy participated in dynamic functional therapeutic activities to improve functional performance for 00 minutes, including:    Tommy participated in gait training to improve functional mobility and safety for 00  minutes, including:    Home Exercises Provided and Patient Education Provided     Education provided:   - continue with exercises at home   - gaining mobility - slow controlled motion     Written Home Exercises Provided: Patient instructed to cont prior HEP.  Exercises were reviewed and Tommy was able to demonstrate them prior to the end of the session.  Tommy demonstrated good  understanding of the education provided.     See EMR under Patient Instructions for exercises provided 9/21/2020.    Assessment     Pt tolerated treatment good today. Pt continues progressing well. He had more swelling in his ankle this afternoon. Added more closed chain exercises with instructions on WBAT. Instructed on improved heel contact and dorsiflexion during stance phase to swing phase. Pt with fair tolerance to grade 3 joint mobs to talocrural joint. Pt with improved PROM and AROM of eversion, inversion this session. Pt is appropriate to continue with therapy at this time and plan to do more standing  exercises next session as able.   Tommy is progressing well towards his goals.   Pt prognosis is Good.     Pt will continue to benefit from skilled outpatient physical therapy to address the deficits listed in the problem list box on initial evaluation, provide pt/family education and to maximize pt's level of independence in the home and community environment.   Pt's spiritual, cultural and educational needs considered and pt agreeable to plan of care and goals.     Anticipated barriers to physical therapy: none    Goals:   Short Term GOALS: 6 weeks. Pt agrees with goals set. Ongoing, in progress   1. Patient demonstrates independence with HEP.   2. Patient demonstrates independence with Postural Awareness.   3. Patient demonstrates improved R wrist motion by 10 degrees in all directions to improve gripping and manipulating objects.   4. Patient demonstrates increased L ankle ROM to be symmetrical with R ankle ROM in all directions to improve tolerance to functional activities pain free.    5. Patient demonstrates ability to walk 500 feet with no significant increase in pain to improve independence with household mobility.      Long Term GOALS: 12 weeks. Pt agrees with goals set. Ongoing, in progress   1. Patient demonstrates increased R wrist mobility to be symmetrical to L wrist mobility to improve tolerance to functional activities pain free.   2. Patient demonstrates increased strength BLE's to 4+/5 or greater to improve tolerance to functional activities pain free.   3. Patient demonstrates improved overall function per FOTO Ankle Survey to 35% Limitation or less.   4. Patient demonstrates ability to walk 1 mile with no discomfort or gait abnormalities to improve independence with community mobility.   5. Patient will return to work with minimal to no modifications required to perform job independently.     Plan     Plan of care Certification: 9/21/2020 to 12/21/2020.    Isometrics, WBAT standing exercises,  improving ankle ROM     Kortney Gaines, PT   10/12/2020

## 2020-10-12 ENCOUNTER — CLINICAL SUPPORT (OUTPATIENT)
Dept: REHABILITATION | Facility: HOSPITAL | Age: 50
End: 2020-10-12
Attending: ORTHOPAEDIC SURGERY
Payer: COMMERCIAL

## 2020-10-12 DIAGNOSIS — R53.1 DECREASED STRENGTH, ENDURANCE, AND MOBILITY: ICD-10-CM

## 2020-10-12 DIAGNOSIS — R68.89 DECREASED STRENGTH, ENDURANCE, AND MOBILITY: ICD-10-CM

## 2020-10-12 DIAGNOSIS — Z74.09 DECREASED STRENGTH, ENDURANCE, AND MOBILITY: ICD-10-CM

## 2020-10-12 DIAGNOSIS — M25.571 ACUTE RIGHT ANKLE PAIN: ICD-10-CM

## 2020-10-12 DIAGNOSIS — R26.2 DIFFICULTY WALKING: ICD-10-CM

## 2020-10-12 DIAGNOSIS — M25.671 DECREASED RANGE OF MOTION OF RIGHT ANKLE: ICD-10-CM

## 2020-10-12 PROCEDURE — 97112 NEUROMUSCULAR REEDUCATION: CPT | Mod: PN

## 2020-10-12 PROCEDURE — 97140 MANUAL THERAPY 1/> REGIONS: CPT | Mod: PN

## 2020-10-12 PROCEDURE — 97110 THERAPEUTIC EXERCISES: CPT | Mod: PN

## 2020-10-14 NOTE — PROGRESS NOTES
Physical Therapy Daily Treatment Note     Name: Tommy Lamar Sr.  Clinic Number: [id number]    Therapy Diagnosis:   Encounter Diagnoses   Name Primary?    Acute right ankle pain     Decreased range of motion of right ankle     Decreased strength, endurance, and mobility     Difficulty walking      Physician: Christy Workman MD    Visit Date: 10/15/2020    Physician Orders: PT Eval and Treat   Medical Diagnosis from Referral:   Closed bimalleolar fracture of right ankle with routine healing, subsequent encounter   M25.531 (ICD-10-CM) - Wrist pain, right   Evaluation Date: 9/21/2020    PN Due: 10/21/2020  Authorization Period Expiration: 12/31/2020  Plan of Care Expiration: 12/31/2020  Visit # / Visits authorized: 7/30 (+2)     Time In: 1:47p  Time Out: 2:25p  Total Time: 38 minutes  Total Billable Time:  38 minutes    Precautions: Standard and Weightbearing as tolerated; fall risk   S/P: closed bimalleolar fracture on 7/29/2020:   POW 11 by 10/14/2020  Subjective     Pt reports: he is doing good. He's been wheeling himself without his boot on to get some more mobility. Swelling isn't bad. Wrist feels basically 100% so he thinks it was his gout that was flaring up. He has to leave at 2:25 on the dot.    He was compliant with home exercise program.  Response to previous treatment: good  Functional change: none     Pain: 0/10 (right wrist) & 1/10 (right ankle    Objective     Tommy received therapeutic exercises to develop strength, endurance, ROM, flexibility, posture and core stabilization for 10 minutes including:    Seated in Chair:  Sit to stands with boot on 2x10  Seated toe raises 2x10    Seated heel raises 2x10   Seated rockerboard PF/DF and INV/EV x2 min ea   Prostretch PF/DF 3x15 sec  Towel Advaliant wipers x20   Ankle circles x10 each way (CW, CCW)    On Mat:  Sidelying eversion/inversion, YTB x20 ea   Bridges to tolerance x30  4 way ankle PROM x10 ea   4 way ankle with YTB 3x10   Long sitting  gastroc stretch [id number]      Tommy participated in neuromuscular re-education activities to improve: Balance, Coordination, Sense, Proprioception and Posture for 10 minutes. The following activities were included:  No boot:   Weight shifting as tolerated in // bars - RLE anterior/posterior; side shifting x 2 min ea   Static standing on ground in // bars x1 min (NBOS)  Static standing on foam in // bars 2x1 min (NBOS)   Weight shifting on foam in // bars (side to side) x1 min   Tandem stance x1 min ea     Tommy received the following manual therapy techniques: Soft tissue Mobilization were applied to the: R ankle/foot for 8 minutes, including:  TC joint mobs - gentle - AP grade 3-4 joint mobs   tibfib gentle grade 1-2 joint mobs     Tommy participated in gait training to improve functional mobility and safety for 10 minutes, including:  Walking in // bars with no boot on - progressing to crutch walking with no boot on     Home Exercises Provided and Patient Education Provided     Education provided:   - continue with exercises at home     Written Home Exercises Provided: Patient instructed to cont prior HEP.  Exercises were reviewed and Tommy was able to demonstrate them prior to the end of the session.  Tommy demonstrated good  understanding of the education provided.     See EMR under Patient Instructions for exercises provided 9/21/2020.    Assessment     Pt tolerated treatment good today. Pt arrived a little late and had to leave a little early this session. Pt challenged with gait training this session. He demonstrates decreased DF glide and posterior movement of talocrural joint during stance phase - requires moderate cueing for improving this. He continues with decreased ability to have anterior translation of tibia over ankle. Pt improved mildly and tolerated joint mobs well. He had 2-5 degrees of passive/active DF ROM this session. Pt is appropriate to continue working towards goals.   Tommy is progressing well  towards his goals.   Pt prognosis is Good.     Pt will continue to benefit from skilled outpatient physical therapy to address the deficits listed in the problem list box on initial evaluation, provide pt/family education and to maximize pt's level of independence in the home and community environment.   Pt's spiritual, cultural and educational needs considered and pt agreeable to plan of care and goals.     Anticipated barriers to physical therapy: none    Goals:   Short Term GOALS: 6 weeks. Pt agrees with goals set. Ongoing, in progress   1. Patient demonstrates independence with HEP.   2. Patient demonstrates independence with Postural Awareness.   3. Patient demonstrates improved R wrist motion by 10 degrees in all directions to improve gripping and manipulating objects.   4. Patient demonstrates increased L ankle ROM to be symmetrical with R ankle ROM in all directions to improve tolerance to functional activities pain free.    5. Patient demonstrates ability to walk 500 feet with no significant increase in pain to improve independence with household mobility.      Long Term GOALS: 12 weeks. Pt agrees with goals set. Ongoing, in progress   1. Patient demonstrates increased R wrist mobility to be symmetrical to L wrist mobility to improve tolerance to functional activities pain free.   2. Patient demonstrates increased strength BLE's to 4+/5 or greater to improve tolerance to functional activities pain free.   3. Patient demonstrates improved overall function per FOTO Ankle Survey to 35% Limitation or less.   4. Patient demonstrates ability to walk 1 mile with no discomfort or gait abnormalities to improve independence with community mobility.   5. Patient will return to work with minimal to no modifications required to perform job independently.     Plan     Plan of care Certification: 9/21/2020 to 12/21/2020.    Isometrics, WBAT standing exercises, improving ankle ROM     Kortney Gaines, PT   10/15/2020

## 2020-10-15 ENCOUNTER — CLINICAL SUPPORT (OUTPATIENT)
Dept: REHABILITATION | Facility: HOSPITAL | Age: 50
End: 2020-10-15
Attending: ORTHOPAEDIC SURGERY
Payer: COMMERCIAL

## 2020-10-15 DIAGNOSIS — M25.671 DECREASED RANGE OF MOTION OF RIGHT ANKLE: ICD-10-CM

## 2020-10-15 DIAGNOSIS — Z74.09 DECREASED STRENGTH, ENDURANCE, AND MOBILITY: ICD-10-CM

## 2020-10-15 DIAGNOSIS — R53.1 DECREASED STRENGTH, ENDURANCE, AND MOBILITY: ICD-10-CM

## 2020-10-15 DIAGNOSIS — M25.571 ACUTE RIGHT ANKLE PAIN: ICD-10-CM

## 2020-10-15 DIAGNOSIS — R68.89 DECREASED STRENGTH, ENDURANCE, AND MOBILITY: ICD-10-CM

## 2020-10-15 DIAGNOSIS — R26.2 DIFFICULTY WALKING: ICD-10-CM

## 2020-10-15 PROCEDURE — 97116 GAIT TRAINING THERAPY: CPT | Mod: PN

## 2020-10-15 PROCEDURE — 97110 THERAPEUTIC EXERCISES: CPT | Mod: PN

## 2020-10-15 PROCEDURE — 97112 NEUROMUSCULAR REEDUCATION: CPT | Mod: PN

## 2020-10-21 NOTE — PROGRESS NOTES
Physical Therapy Daily Treatment Note     Name: Tommy Lamar Sr.  Clinic Number: [id number]    Therapy Diagnosis:   Encounter Diagnoses   Name Primary?    Acute right ankle pain     Decreased range of motion of right ankle     Decreased strength, endurance, and mobility     Difficulty walking      Physician: Christy Workman MD    Visit Date: 10/22/2020    Physician Orders: PT Eval and Treat   Medical Diagnosis from Referral:   Closed bimalleolar fracture of right ankle with routine healing, subsequent encounter   M25.531 (ICD-10-CM) - Wrist pain, right   Evaluation Date: 9/21/2020    PN Due: 10/21/2020  Authorization Period Expiration: 12/31/2020  Plan of Care Expiration: 12/31/2020  Visit # / Visits authorized: 8/30 (+2)     Time In: 1:34p  Time Out: 2:28p  Total Time: 54 minutes  Total Billable Time:  54 minutes    Precautions: Standard and Weightbearing as tolerated; fall risk   S/P: closed bimalleolar fracture on 7/29/2020:   POW 12 by 10/21/2020  Subjective     Pt reports: he has been walking with his boot on and one crutch or no crutches.    He was compliant with home exercise program.  Response to previous treatment: good  Functional change: none     Pain: 0/10 (right wrist) & 1/10 (right ankle    Objective   FOTO next session                 R/L:   Wrist Ext: WFL/ WFL    Wrist Flex: WFL/WFL  Wrist RD: WFL/ WFL   Wrist UD: WFL//WFL  Supination/Pronation: WFL/WFL  Elbow extension/flexion: WFL/WFL    Range of Motion: Ankle     Left Right   Dorsiflexion: 10 5   Plantarflexion 40 25   Inversion 25 10   Eversion 15 5      Strength: Ankle     Left Right   Gastrocnemius 5/5 3/5   Soleus 4+/5 3/5   Dorsiflexion 5/5 4-/5   Inversion 5/5 4-/5   Eversion 5/5 4-/5         Strength: Knee    Left Right   Knee ext 5/5 4+/5   Knee flex 5/5 5/5         Strength: Hip     Left Right   Hip Flexion 4+/5 4+/5   Hip Abduction 4/5 4-/5   IR 4+/5 4+/5   ER 4+/5 4-/5   Hip Extension 4+/5 4/5      Joint Mobility: decreased  mobility of talocrural joint   Palpation: none to note   Sensation: intact to light touch    Tommy received therapeutic exercises to develop strength, endurance, ROM, flexibility, posture and core stabilization for 29 minutes including:    Seated in Chair:  Sit to stands with boot on (2 in block under foot) 2x10  Seated toe/heel raises, 8# 2x10    Seated rockerboard PF/DF and INV/EV x2 min ea   Ankle circles x10 each way (CW, CCW)    On Mat:  Sidelying eversion/inversion, YTB x20 ea   Bridges  x30  4 way ankle PROM x10 ea   4 way ankle with RTB 2x10   Long sitting gastroc stretch [id number]   +Shuttle squats with boot on, 2 cords 3x10     Tommy participated in neuromuscular re-education activities to improve: Balance, Coordination, Sense, Proprioception and Posture for 12 minutes. The following activities were included:  No boot:   Weight shifting as tolerated in // bars - RLE anterior/posterior; side shifting x 2 min ea   Static standing on ground in // bars x1 min (NBOS)  Static standing on ground in // bars, EC (NBOS) x1 min  Static standing on foam in // bars 2x1 min (NBOS)   Weight shifting on foam in // bars (side to side) x1 min   Tandem stance x1 min ea     Tommy received the following manual therapy techniques: Soft tissue Mobilization were applied to the: R ankle/foot for 10 minutes, including:  TC joint mobs - gentle - AP grade 3-4 joint mobs   tibfib gentle grade 1-2 joint mobs   TC mobilization with movement into DF     Tommy participated in gait training to improve functional mobility and safety for 3 minutes, including:  Walking in // bars with boot on -1 UE support     Home Exercises Provided and Patient Education Provided     Education provided:   - continue with exercises at home     Written Home Exercises Provided: Patient instructed to cont prior HEP.  Exercises were reviewed and Tommy was able to demonstrate them prior to the end of the session.  Tommy demonstrated good  understanding of the education  provided.     See EMR under Patient Instructions for exercises provided 9/21/2020.    Assessment     Pt tolerated treatment good today. A progress note was performed today. He has met 3 goals so far and is progressing well towards his other goals. He has been in PT for 4 weeks and he is 12 weeks post-op. He has improved his R ankle mobility, strength, and ability to bear weight through RLE. He continues lacking joint mobility and end range of motion in all directions. He has increased pain without any UE support even with boot on. With cueing and holding on to one // bar, pt had improved RLE weight acceptance, step length, and decreased pain. Pt has improved his wrist pain and attributes this to a gout flare up. Pt is very compliant with HEP; however, instructed to continue wearing boot until told otherwise by his doctor when he is walking around his house - he had good ability to walk with one crutch and boot this session. Pt is appropriate to continue with therapy and is expected to benefit from continued work on ankle mobility, weight acceptance, gait training, and NMR. Continue with POC and progress to his goals.   Tommy is progressing well towards his goals.   Pt prognosis is Good.     Pt will continue to benefit from skilled outpatient physical therapy to address the deficits listed in the problem list box on initial evaluation, provide pt/family education and to maximize pt's level of independence in the home and community environment.   Pt's spiritual, cultural and educational needs considered and pt agreeable to plan of care and goals.     Anticipated barriers to physical therapy: none    Goals:   Short Term GOALS: 6 weeks. Pt agrees with goals set.   1. Patient demonstrates independence with HEP. Goal met   2. Patient demonstrates independence with Postural Awareness. Goal met   3. Patient demonstrates improved R wrist motion by 10 degrees in all directions to improve gripping and manipulating objects. Goal  met   4. Patient demonstrates increased L ankle ROM to be symmetrical with R ankle ROM in all directions to improve tolerance to functional activities pain free.  ongoing, nearly met   5. Patient demonstrates ability to walk 500 feet with no significant increase in pain to improve independence with household mobility. Ongoing, patient walking with crutches (reassess in 2 weeks)      Long Term GOALS: 12 weeks. Pt agrees with goals set. Ongoing, in progress   1. Patient demonstrates increased R wrist mobility to be symmetrical to L wrist mobility to improve tolerance to functional activities pain free. Goal met   2. Patient demonstrates increased strength BLE's to 4+/5 or greater to improve tolerance to functional activities pain free.   3. Patient demonstrates improved overall function per FOTO Ankle Survey to 35% Limitation or less.   4. Patient demonstrates ability to walk 1 mile with no discomfort or gait abnormalities to improve independence with community mobility.   5. Patient will return to work with minimal to no modifications required to perform job independently.     Plan     Plan of care Certification: 9/21/2020 to 12/21/2020.    Closed chain exercises as able; ankle mobility and strength      Kortney Gaines, PT   10/22/2020

## 2020-10-22 ENCOUNTER — CLINICAL SUPPORT (OUTPATIENT)
Dept: REHABILITATION | Facility: HOSPITAL | Age: 50
End: 2020-10-22
Attending: ORTHOPAEDIC SURGERY
Payer: COMMERCIAL

## 2020-10-22 DIAGNOSIS — M25.671 DECREASED RANGE OF MOTION OF RIGHT ANKLE: ICD-10-CM

## 2020-10-22 DIAGNOSIS — Z74.09 DECREASED STRENGTH, ENDURANCE, AND MOBILITY: ICD-10-CM

## 2020-10-22 DIAGNOSIS — M25.571 ACUTE RIGHT ANKLE PAIN: ICD-10-CM

## 2020-10-22 DIAGNOSIS — R68.89 DECREASED STRENGTH, ENDURANCE, AND MOBILITY: ICD-10-CM

## 2020-10-22 DIAGNOSIS — R26.2 DIFFICULTY WALKING: ICD-10-CM

## 2020-10-22 DIAGNOSIS — R53.1 DECREASED STRENGTH, ENDURANCE, AND MOBILITY: ICD-10-CM

## 2020-10-22 PROCEDURE — 97110 THERAPEUTIC EXERCISES: CPT | Mod: PN

## 2020-10-22 PROCEDURE — 97140 MANUAL THERAPY 1/> REGIONS: CPT | Mod: PN

## 2020-10-22 PROCEDURE — 97112 NEUROMUSCULAR REEDUCATION: CPT | Mod: PN

## 2020-10-23 NOTE — PROGRESS NOTES
Physical Therapy Daily Treatment Note     Name: Tommy Lamar Sr.  Clinic Number: [id number]    Therapy Diagnosis:   Encounter Diagnoses   Name Primary?    Acute right ankle pain     Decreased range of motion of right ankle     Decreased strength, endurance, and mobility     Difficulty walking      Physician: Christy Workman MD    Visit Date: 10/26/2020    Physician Orders: PT Eval and Treat   Medical Diagnosis from Referral:   Closed bimalleolar fracture of right ankle with routine healing, subsequent encounter   M25.531 (ICD-10-CM) - Wrist pain, right   Evaluation Date: 9/21/2020    PN Due: 11/21/2020  Authorization Period Expiration: 12/31/2020  Plan of Care Expiration: 12/31/2020  Visit # / Visits authorized: 9/30 (+2)     Time In: 12:00p  Time Out: 12:42p  Total Billable Time:  42 minutes    Precautions: Standard and Weightbearing as tolerated; fall risk   S/P: closed bimalleolar fracture on 7/29/2020:   POW 12 by 10/21/2020  Subjective     Pt reports: he has been feeling really good with no pain. He has his doctor appointment after this appointment.    He was compliant with home exercise program.  Response to previous treatment: good  Functional change: none     Pain: 0/10 (right wrist) & 0/10 (right ankle)    Objective   FOTO next session     Tommy received therapeutic exercises to develop strength, endurance, ROM, flexibility, posture and core stabilization for 19 minutes including:  Nustep, no boot on x5 min     Seated in Chair:  Sit to stands with boot on (2 in block under foot) 2x10  Seated toe/heel raises, 8# 2x10    Seated rockerboard PF/DF and INV/EV x2 min ea   Ankle circles x5 each way (CW, CCW)    On Mat:  Sidelying eversion/inversion, RTB x20 ea   Bridges  x30  4 way ankle PROM x10 ea   PF/DF with GTB 3x10   Long sitting gastroc stretch [id number]   Shuttle squats with boot on, 2 cords 3x10     Tommy participated in neuromuscular re-education activities to improve: Balance, Coordination,  Sense, Proprioception and Posture for 12 minutes. The following activities were included:  No boot:   Weight shifting as tolerated in // bars - RLE anterior/posterior; side shifting x 2 min ea   Static standing on ground in // bars x1 min (NBOS)  Static standing on ground in // bars, EC (NBOS) x1 min  Static standing on foam in // bars 2x1 min (NBOS)   Weight shifting on foam in // bars (side to side) x1 min   Tandem stance x1 min ea     Tommy received the following manual therapy techniques: Soft tissue Mobilization were applied to the: R ankle/foot for 8 minutes, including:  TC joint mobs - gentle - AP grade 3-4 joint mobs   tibfib gentle grade 1-2 joint mobs   TC mobilization with movement into DF     Tommy participated in gait training to improve functional mobility and safety for 3 minutes, including:  Walking in // bars with boot on -0 to 1 UE support     Home Exercises Provided and Patient Education Provided     Education provided:   - continue with exercises at home     Written Home Exercises Provided: Patient instructed to cont prior HEP.  Exercises were reviewed and Tmomy was able to demonstrate them prior to the end of the session.  Tommy demonstrated good  understanding of the education provided.     See EMR under Patient Instructions for exercises provided 9/21/2020.    Assessment     Pt tolerated treatment good today. Pt continues progressing with therapy. He was able to walk in // bars without assistance or UE support, use of boot. He continues with decreased step length but no pain; working on this with use of UE in // bars. He is tolerating standing activities very well with no increase in pain or tingling sensation. Pt has improved joint mobility this session, but continues with decreased closed chain dorsiflexion/plantarflexion. Pt is appropriate to continue with therapy at this time progressing towards goals.   Tommy is progressing well towards his goals.   Pt prognosis is Good.     Pt will continue  to benefit from skilled outpatient physical therapy to address the deficits listed in the problem list box on initial evaluation, provide pt/family education and to maximize pt's level of independence in the home and community environment.   Pt's spiritual, cultural and educational needs considered and pt agreeable to plan of care and goals.     Anticipated barriers to physical therapy: none    Goals:   Short Term GOALS: 6 weeks. Pt agrees with goals set.   1. Patient demonstrates independence with HEP. Goal met   2. Patient demonstrates independence with Postural Awareness. Goal met   3. Patient demonstrates improved R wrist motion by 10 degrees in all directions to improve gripping and manipulating objects. Goal met   4. Patient demonstrates increased L ankle ROM to be symmetrical with R ankle ROM in all directions to improve tolerance to functional activities pain free.  ongoing, nearly met   5. Patient demonstrates ability to walk 500 feet with no significant increase in pain to improve independence with household mobility. Ongoing, patient walking with crutches (reassess in 2 weeks)      Long Term GOALS: 12 weeks. Pt agrees with goals set. Ongoing, in progress   1. Patient demonstrates increased R wrist mobility to be symmetrical to L wrist mobility to improve tolerance to functional activities pain free. Goal met   2. Patient demonstrates increased strength BLE's to 4+/5 or greater to improve tolerance to functional activities pain free.   3. Patient demonstrates improved overall function per FOTO Ankle Survey to 35% Limitation or less.   4. Patient demonstrates ability to walk 1 mile with no discomfort or gait abnormalities to improve independence with community mobility.   5. Patient will return to work with minimal to no modifications required to perform job independently.     Plan     Plan of care Certification: 9/21/2020 to 12/21/2020.    Closed chain exercises as able; ankle mobility and strength       Kortney Gaines, PT   10/26/2020

## 2020-10-25 DIAGNOSIS — M25.571 RIGHT ANKLE PAIN, UNSPECIFIED CHRONICITY: Primary | ICD-10-CM

## 2020-10-25 DIAGNOSIS — S82.841A CLOSED BIMALLEOLAR FRACTURE OF RIGHT ANKLE, INITIAL ENCOUNTER: ICD-10-CM
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
## 2020-10-26 ENCOUNTER — CLINICAL SUPPORT (OUTPATIENT)
Dept: REHABILITATION | Facility: HOSPITAL | Age: 50
End: 2020-10-26
Attending: ORTHOPAEDIC SURGERY
Payer: COMMERCIAL

## 2020-10-26 ENCOUNTER — LAB VISIT (OUTPATIENT)
Dept: LAB | Facility: HOSPITAL | Age: 50
End: 2020-10-26
Attending: ORTHOPAEDIC SURGERY
Payer: COMMERCIAL

## 2020-10-26 ENCOUNTER — OFFICE VISIT (OUTPATIENT)
Dept: ORTHOPEDICS | Facility: CLINIC | Age: 50
End: 2020-10-26
Payer: COMMERCIAL

## 2020-10-26 VITALS
HEART RATE: 60 BPM | SYSTOLIC BLOOD PRESSURE: 122 MMHG | BODY MASS INDEX: 31.83 KG/M2 | DIASTOLIC BLOOD PRESSURE: 80 MMHG | WEIGHT: 210 LBS | OXYGEN SATURATION: 96 % | RESPIRATION RATE: 18 BRPM | HEIGHT: 68 IN

## 2020-10-26 DIAGNOSIS — R53.1 DECREASED STRENGTH, ENDURANCE, AND MOBILITY: ICD-10-CM

## 2020-10-26 DIAGNOSIS — S82.841D CLOSED BIMALLEOLAR FRACTURE OF RIGHT ANKLE WITH ROUTINE HEALING, SUBSEQUENT ENCOUNTER: Primary | ICD-10-CM

## 2020-10-26 DIAGNOSIS — S82.841D CLOSED BIMALLEOLAR FRACTURE OF RIGHT ANKLE WITH ROUTINE HEALING, SUBSEQUENT ENCOUNTER: ICD-10-CM

## 2020-10-26 DIAGNOSIS — R68.89 DECREASED STRENGTH, ENDURANCE, AND MOBILITY: ICD-10-CM

## 2020-10-26 DIAGNOSIS — M25.571 ACUTE RIGHT ANKLE PAIN: ICD-10-CM

## 2020-10-26 DIAGNOSIS — R26.2 DIFFICULTY WALKING: ICD-10-CM

## 2020-10-26 DIAGNOSIS — Z74.09 DECREASED STRENGTH, ENDURANCE, AND MOBILITY: ICD-10-CM

## 2020-10-26 DIAGNOSIS — M25.671 DECREASED RANGE OF MOTION OF RIGHT ANKLE: ICD-10-CM

## 2020-10-26 LAB
ALBUMIN SERPL BCP-MCNC: 4.2 G/DL (ref 3.5–5.2)
ALP SERPL-CCNC: 95 U/L (ref 55–135)
ALT SERPL W/O P-5'-P-CCNC: 20 U/L (ref 10–44)
ANION GAP SERPL CALC-SCNC: 12 MMOL/L (ref 8–16)
AST SERPL-CCNC: 18 U/L (ref 10–40)
BILIRUB SERPL-MCNC: 0.3 MG/DL (ref 0.1–1)
BUN SERPL-MCNC: 17 MG/DL (ref 6–20)
CALCIUM SERPL-MCNC: 9.1 MG/DL (ref 8.7–10.5)
CHLORIDE SERPL-SCNC: 105 MMOL/L (ref 95–110)
CO2 SERPL-SCNC: 26 MMOL/L (ref 23–29)
CREAT SERPL-MCNC: 1.4 MG/DL (ref 0.5–1.4)
EST. GFR  (AFRICAN AMERICAN): >60 ML/MIN/1.73 M^2
EST. GFR  (NON AFRICAN AMERICAN): 57 ML/MIN/1.73 M^2
GLUCOSE SERPL-MCNC: 84 MG/DL (ref 70–110)
POTASSIUM SERPL-SCNC: 4.1 MMOL/L (ref 3.5–5.1)
PROT SERPL-MCNC: 8.3 G/DL (ref 6–8.4)
SODIUM SERPL-SCNC: 143 MMOL/L (ref 136–145)
T4 FREE SERPL-MCNC: 1.04 NG/DL (ref 0.71–1.51)
TSH SERPL DL<=0.005 MIU/L-ACNC: 2.13 UIU/ML (ref 0.4–4)

## 2020-10-26 PROCEDURE — 99024 POSTOP FOLLOW-UP VISIT: CPT | Mod: S$GLB,,, | Performed by: ORTHOPAEDIC SURGERY

## 2020-10-26 PROCEDURE — 99999 PR PBB SHADOW E&M-EST. PATIENT-LVL IV: ICD-10-PCS | Mod: PBBFAC,,, | Performed by: ORTHOPAEDIC SURGERY

## 2020-10-26 PROCEDURE — 82607 VITAMIN B-12: CPT

## 2020-10-26 PROCEDURE — 97110 THERAPEUTIC EXERCISES: CPT | Mod: PN

## 2020-10-26 PROCEDURE — 99024 PR POST-OP FOLLOW-UP VISIT: ICD-10-PCS | Mod: S$GLB,,, | Performed by: ORTHOPAEDIC SURGERY

## 2020-10-26 PROCEDURE — 36415 COLL VENOUS BLD VENIPUNCTURE: CPT | Mod: PN

## 2020-10-26 PROCEDURE — 99999 PR PBB SHADOW E&M-EST. PATIENT-LVL IV: CPT | Mod: PBBFAC,,, | Performed by: ORTHOPAEDIC SURGERY

## 2020-10-26 PROCEDURE — 84443 ASSAY THYROID STIM HORMONE: CPT

## 2020-10-26 PROCEDURE — 97140 MANUAL THERAPY 1/> REGIONS: CPT | Mod: PN

## 2020-10-26 PROCEDURE — 82306 VITAMIN D 25 HYDROXY: CPT

## 2020-10-26 PROCEDURE — 84439 ASSAY OF FREE THYROXINE: CPT

## 2020-10-26 PROCEDURE — 80053 COMPREHEN METABOLIC PANEL: CPT

## 2020-10-26 PROCEDURE — 82746 ASSAY OF FOLIC ACID SERUM: CPT

## 2020-10-26 PROCEDURE — 97112 NEUROMUSCULAR REEDUCATION: CPT | Mod: PN

## 2020-10-27 ENCOUNTER — TELEPHONE (OUTPATIENT)
Dept: ORTHOPEDICS | Facility: CLINIC | Age: 50
End: 2020-10-27

## 2020-10-27 LAB
25(OH)D3+25(OH)D2 SERPL-MCNC: 24 NG/ML (ref 30–96)
FOLATE SERPL-MCNC: 10.4 NG/ML (ref 4–24)
VIT B12 SERPL-MCNC: 443 PG/ML (ref 210–950)
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
## 2020-11-04 ENCOUNTER — PATIENT MESSAGE (OUTPATIENT)
Dept: ORTHOPEDICS | Facility: CLINIC | Age: 50
End: 2020-11-04
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
## 2020-11-05 ENCOUNTER — CLINICAL SUPPORT (OUTPATIENT)
Dept: REHABILITATION | Facility: HOSPITAL | Age: 50
End: 2020-11-05
Attending: ORTHOPAEDIC SURGERY
Payer: COMMERCIAL

## 2020-11-05 DIAGNOSIS — R53.1 DECREASED STRENGTH, ENDURANCE, AND MOBILITY: ICD-10-CM

## 2020-11-05 DIAGNOSIS — M25.671 DECREASED RANGE OF MOTION OF RIGHT ANKLE: ICD-10-CM

## 2020-11-05 DIAGNOSIS — R68.89 DECREASED STRENGTH, ENDURANCE, AND MOBILITY: ICD-10-CM

## 2020-11-05 DIAGNOSIS — M25.571 ACUTE RIGHT ANKLE PAIN: ICD-10-CM

## 2020-11-05 DIAGNOSIS — R26.2 DIFFICULTY WALKING: ICD-10-CM

## 2020-11-05 DIAGNOSIS — Z74.09 DECREASED STRENGTH, ENDURANCE, AND MOBILITY: ICD-10-CM

## 2020-11-05 PROCEDURE — 97112 NEUROMUSCULAR REEDUCATION: CPT | Mod: PN

## 2020-11-05 PROCEDURE — 97110 THERAPEUTIC EXERCISES: CPT | Mod: PN

## 2020-11-05 PROCEDURE — 97140 MANUAL THERAPY 1/> REGIONS: CPT | Mod: PN

## 2020-11-06 ENCOUNTER — TELEPHONE (OUTPATIENT)
Dept: ORTHOPEDICS | Facility: CLINIC | Age: 50
End: 2020-11-06

## 2020-11-06 RX ORDER — DICLOFENAC SODIUM 50 MG/1
50 TABLET, DELAYED RELEASE ORAL 2 TIMES DAILY
Qty: 60 TABLET | Refills: 0 | Status: SHIPPED | OUTPATIENT
Start: 2020-11-06 | End: 2021-02-01

## 2020-11-06 NOTE — TELEPHONE ENCOUNTER
----- Message from Bell Zarate sent at 11/6/2020 10:29 AM CST -----  Type: Patient Call Back    Who called: pt wife  Karuna      What is the request in detail: Pt wife states her 's foot is bothering  him and that he is in some pain. Pt wife would like the Dr to prescribe him something for the pain. Pt states her  is a  and he is still in pain.     Can the clinic reply by [facility]? No     Would the patient rather a call back or a response via [facility]?  Call back      Best call back number:  [phone] (wife)  [phone] (PT)    Additional Information: Pt wife says you can call her first, then you can try to call her .       Thank You

## 2020-11-09 ENCOUNTER — CLINICAL SUPPORT (OUTPATIENT)
Dept: REHABILITATION | Facility: HOSPITAL | Age: 50
End: 2020-11-09
Attending: ORTHOPAEDIC SURGERY
Payer: COMMERCIAL

## 2020-11-09 DIAGNOSIS — R26.2 DIFFICULTY WALKING: ICD-10-CM

## 2020-11-09 DIAGNOSIS — S82.841G: Primary | ICD-10-CM

## 2020-11-09 DIAGNOSIS — R68.89 DECREASED STRENGTH, ENDURANCE, AND MOBILITY: ICD-10-CM

## 2020-11-09 DIAGNOSIS — S82.841D CLOSED BIMALLEOLAR FRACTURE OF RIGHT ANKLE WITH ROUTINE HEALING, SUBSEQUENT ENCOUNTER: ICD-10-CM

## 2020-11-09 DIAGNOSIS — M25.571 ACUTE RIGHT ANKLE PAIN: ICD-10-CM

## 2020-11-09 DIAGNOSIS — M25.671 DECREASED RANGE OF MOTION OF RIGHT ANKLE: ICD-10-CM

## 2020-11-09 DIAGNOSIS — Z74.09 DECREASED STRENGTH, ENDURANCE, AND MOBILITY: ICD-10-CM

## 2020-11-09 DIAGNOSIS — R53.1 DECREASED STRENGTH, ENDURANCE, AND MOBILITY: ICD-10-CM

## 2020-11-09 PROCEDURE — 97140 MANUAL THERAPY 1/> REGIONS: CPT | Mod: PN

## 2020-11-09 PROCEDURE — 97112 NEUROMUSCULAR REEDUCATION: CPT | Mod: PN

## 2020-11-09 PROCEDURE — 97110 THERAPEUTIC EXERCISES: CPT | Mod: PN

## 2020-11-09 NOTE — PROGRESS NOTES
Physical Therapy Daily Treatment Note     Name: Tommy Lamar Sr.  Clinic Number: [id number]    Therapy Diagnosis:   Encounter Diagnoses   Name Primary?    Acute right ankle pain     Decreased range of motion of right ankle     Decreased strength, endurance, and mobility     Difficulty walking      Physician: Christy Workman MD    Visit Date: 11/9/2020    Physician Orders: PT Eval and Treat   Medical Diagnosis from Referral:   Closed bimalleolar fracture of right ankle with routine healing, subsequent encounter   M25.531 (ICD-10-CM) - Wrist pain, right   Evaluation Date: 9/21/2020    PN Due: 11/21/2020  Authorization Period Expiration: 12/31/2020  Plan of Care Expiration: 12/31/2020  Visit # / Visits authorized: 12/30 (+2)     Time In: 2:17p  Time Out: 3:02p  Total Billable Time: 45 minutes    Precautions: Standard and Weightbearing as tolerated; fall risk   S/P: closed bimalleolar fracture on 7/29/2020:   POW 14 by 11/4/2020  Subjective     Pt reports: he is hurting today. He is confused as to why it hurts if he walks and he doesn't know what to do about the pain. He states he has been walking a lot, maybe about a mile a day. He puts it in the Jacuzzi every night and then ices it afterwards. He trys to keep it moving.    He was compliant with home exercise program.  Response to previous treatment: good  Functional change: none     Pain: 0/10 (right wrist) & 4/10 (right ankle)    Objective      Tommy received therapeutic exercises to develop strength, endurance, ROM, flexibility, posture and core stabilization for 29 minutes including:    Upright bike, no boot on x5 min     Gastroc stretch on wedge [id number]   Soleus stretch on wedge [id number]   +Wall squats 2x10   Seated heel raises, 25# 3x10    DF self-mobilization x20, 2 sec hold   Step ups on 4 in step x10     Sidelying eversion/inversion, RTB x20 ea   Single leg bridges  x30  PF/DF with GTB 3x10   Long sitting gastroc stretch [id number]   Shuttle squats 1.5  cords 3x10  Shuttle heel raises red cord 3x10  +Shuttle soleus raises red cord 3x10     Tommy participated in neuromuscular re-education activities to improve: Balance, Coordination, Sense, Proprioception and Posture for 8 minutes. The following activities were included:  No boot:   Static standing on foam in // bars 2x1 min (NBOS)   Single leg stance with LLE on yoga block [id number]   Tandem stance on foam [id number] ea   Rockerboard EV/INV and PF/DF x1.5 min ea     Tommy received the following manual therapy techniques: Soft tissue Mobilization were applied to the: R ankle/foot for 8 minutes, including:  TC joint mobs - gentle - AP grade 3-4 joint mobs   tibfib gentle grade 1-2 joint mobs   TC mobilization with movement into DF     Tommy participated in gait training to improve functional mobility and safety for 00 minutes, including:  Walking in // bars with no boot on -0 to 1 UE support     Home Exercises Provided and Patient Education Provided     Education provided:   - continue with exercises at home     Written Home Exercises Provided: Patient instructed to cont prior HEP.  Exercises were reviewed and Tommy was able to demonstrate them prior to the end of the session.  Tommy demonstrated good  understanding of the education provided.     See EMR under Patient Instructions for exercises provided 9/21/2020.    Assessment     Pt tolerated treatment good today. Pt continues with no boot or crutches; he has new shoes that have more support. He had increased pain, swelling, and redness to ankle region. He demonstrates decreased ankle DF, decreased PF strength, and mild gait difficulties. He was educated on importance of listening to his pain still when walking for long periods of time and he may need to take more breaks in the beginning so he can work up to the load he used to place through his ankle. He was also educated on performing mobility exercises, strengthening exercises in addition to walking as walking is not  enough to get back to his PLOF. He has started taking vitamin D supplements as instructed by his doctor. He had difficulty performing shuttle exercises this session which lead to pain especially with attempts to perform squatting into heel raise. Pt has anterior impingement type symptoms that occur with increased dorsiflexion and tandem stance. He demonstrates significant calf weakness and continues with calf atrophy. Pt is appropriate to continue with therapy at this time and progress as able; promoting tissue healing.   Tommy is progressing well towards his goals.   Pt prognosis is Good.     Pt will continue to benefit from skilled outpatient physical therapy to address the deficits listed in the problem list box on initial evaluation, provide pt/family education and to maximize pt's level of independence in the home and community environment.   Pt's spiritual, cultural and educational needs considered and pt agreeable to plan of care and goals.     Anticipated barriers to physical therapy: none    Goals:   Short Term GOALS: 6 weeks. Pt agrees with goals set.   1. Patient demonstrates independence with HEP. Goal met   2. Patient demonstrates independence with Postural Awareness. Goal met   3. Patient demonstrates improved R wrist motion by 10 degrees in all directions to improve gripping and manipulating objects. Goal met   4. Patient demonstrates increased L ankle ROM to be symmetrical with R ankle ROM in all directions to improve tolerance to functional activities pain free.  ongoing, nearly met   5. Patient demonstrates ability to walk 500 feet with no significant increase in pain to improve independence with household mobility. Ongoing, patient walking with crutches (reassess in 2 weeks)      Long Term GOALS: 12 weeks. Pt agrees with goals set. Ongoing, in progress   1. Patient demonstrates increased R wrist mobility to be symmetrical to L wrist mobility to improve tolerance to functional activities pain free.  Goal met   2. Patient demonstrates increased strength BLE's to 4+/5 or greater to improve tolerance to functional activities pain free.   3. Patient demonstrates improved overall function per FOTO Ankle Survey to 35% Limitation or less.   4. Patient demonstrates ability to walk 1 mile with no discomfort or gait abnormalities to improve independence with community mobility.   5. Patient will return to work with minimal to no modifications required to perform job independently.     Plan     Plan of care Certification: 9/21/2020 to 12/21/2020.    Closed chain exercises as able; ankle mobility and strength      Kortney Gaines, PT   11/09/2020

## 2020-11-10 ENCOUNTER — TELEPHONE (OUTPATIENT)
Dept: ORTHOPEDICS | Facility: CLINIC | Age: 50
End: 2020-11-10

## 2020-11-10 NOTE — TELEPHONE ENCOUNTER
----- Message from Christy Workman MD sent at 11/9/2020  4:13 PM CST -----  Regarding: RE: Discuss Medication  Contact: Tommy  I sent an anti-inflammatory last week did he get it.   Also I am putting in orders for a bone stim for him   ----- Message -----  From: Emani Hui MA  Sent: 11/9/2020   3:59 PM CST  To: Christy Workman MD  Subject: FW: Discuss Medication                           He having a lot pain after therapy and for maybe some for pain. Thanks   ----- Message -----  From: Michaela Gonzalez  Sent: 11/9/2020   3:38 PM CST  To: Ji Ricardo Staff  Subject: Discuss Medication                               Type: Patient Call Back    Who called:Tommy    What is the request in detail:Patient called to discuss medication. Please call to advise    Can the clinic reply by [facility]?    Would the patient rather a call back or a response via [facility]? Call    Best call back number:[phone]

## 2020-11-12 ENCOUNTER — CLINICAL SUPPORT (OUTPATIENT)
Dept: REHABILITATION | Facility: HOSPITAL | Age: 50
End: 2020-11-12
Attending: ORTHOPAEDIC SURGERY
Payer: COMMERCIAL

## 2020-11-12 DIAGNOSIS — M25.571 ACUTE RIGHT ANKLE PAIN: ICD-10-CM

## 2020-11-12 DIAGNOSIS — M25.671 DECREASED RANGE OF MOTION OF RIGHT ANKLE: ICD-10-CM

## 2020-11-12 DIAGNOSIS — R26.2 DIFFICULTY WALKING: ICD-10-CM

## 2020-11-12 DIAGNOSIS — Z74.09 DECREASED STRENGTH, ENDURANCE, AND MOBILITY: ICD-10-CM

## 2020-11-12 DIAGNOSIS — R68.89 DECREASED STRENGTH, ENDURANCE, AND MOBILITY: ICD-10-CM

## 2020-11-12 DIAGNOSIS — R53.1 DECREASED STRENGTH, ENDURANCE, AND MOBILITY: ICD-10-CM

## 2020-11-12 PROCEDURE — 97110 THERAPEUTIC EXERCISES: CPT | Mod: PN

## 2020-11-12 PROCEDURE — 97140 MANUAL THERAPY 1/> REGIONS: CPT | Mod: PN

## 2020-11-16 ENCOUNTER — CLINICAL SUPPORT (OUTPATIENT)
Dept: REHABILITATION | Facility: HOSPITAL | Age: 50
End: 2020-11-16
Attending: ORTHOPAEDIC SURGERY
Payer: COMMERCIAL

## 2020-11-16 DIAGNOSIS — Z74.09 DECREASED STRENGTH, ENDURANCE, AND MOBILITY: ICD-10-CM

## 2020-11-16 DIAGNOSIS — R68.89 DECREASED STRENGTH, ENDURANCE, AND MOBILITY: ICD-10-CM

## 2020-11-16 DIAGNOSIS — R26.2 DIFFICULTY WALKING: ICD-10-CM

## 2020-11-16 DIAGNOSIS — M25.671 DECREASED RANGE OF MOTION OF RIGHT ANKLE: ICD-10-CM

## 2020-11-16 DIAGNOSIS — M25.571 ACUTE RIGHT ANKLE PAIN: ICD-10-CM

## 2020-11-16 DIAGNOSIS — R53.1 DECREASED STRENGTH, ENDURANCE, AND MOBILITY: ICD-10-CM

## 2020-11-16 PROCEDURE — 97140 MANUAL THERAPY 1/> REGIONS: CPT | Mod: PN

## 2020-11-16 PROCEDURE — 97110 THERAPEUTIC EXERCISES: CPT | Mod: PN

## 2020-11-16 NOTE — PROGRESS NOTES
Physical Therapy Daily Treatment Note     Name: Tommy Lamar Sr.  Clinic Number: [id number]    Therapy Diagnosis:   Encounter Diagnoses   Name Primary?    Acute right ankle pain     Decreased range of motion of right ankle     Decreased strength, endurance, and mobility     Difficulty walking      Physician: Christy Workman MD    Visit Date: 11/16/2020    Physician Orders: PT Eval and Treat   Medical Diagnosis from Referral:   Closed bimalleolar fracture of right ankle with routine healing, subsequent encounter   M25.531 (ICD-10-CM) - Wrist pain, right   Evaluation Date: 9/21/2020    PN Due: 11/21/2020  Authorization Period Expiration: 12/31/2020  Plan of Care Expiration: 12/31/2020  Visit # / Visits authorized: 14/30 (+2)     Time In: 2:10p  Time Out: 3:05p  Total Billable Time: 55 minutes    Precautions: Standard and Weightbearing as tolerated; fall risk   S/P: closed bimalleolar fracture on 7/29/2020:   POW 15 by 11/11/2020  Subjective     Pt reports: he reverted back to the boot. He feels basically no pain when wearing the boot.     He was compliant with home exercise program.  Response to previous treatment: good  Functional change: none     Pain: 0/10 (right wrist) & 4/10 (right ankle)    Objective      Tommy received therapeutic exercises to develop strength, endurance, ROM, flexibility, posture and core stabilization for 40 minutes including:    Upright bike, no boot on x5 min, L2  Treadmill x6 min, 1.4mph     Gastroc stretch on wedge [id number]   Soleus stretch on wedge [id number]     DF self-mobilization x20, 2 sec hold   Step ups on 6 in step x15   +Staggered heel raises x10 ea     Sidelying eversion/inversion, GTB x20 ea   Single leg bridges  x30  4 way ankle with GTB 3x10   Long sitting gastroc stretch [id number]   Prone stretch for PF x45 sec    Shuttle squats 2.5 cords 2x10  Shuttle single leg squats 1.5 cords 2x10  Shuttle heel raises with deficit 1 cord 3x10  Shuttle soleus raises 1 cord  3x10  Shuttle single leg heel raises, red cord x15     Tommy participated in neuromuscular re-education activities to improve: Balance, Coordination, Sense, Proprioception and Posture for 7 minutes. The following activities were included:  No boot:   Pre-gait training for coordination and neuro-re-ed to improve DF x30   Single leg stance [id number]   Tandem stance on foam [id number] ea   +Tandem walking 2x15 ft    Tommy received the following manual therapy techniques: Soft tissue Mobilization were applied to the: R ankle/foot for 8 minutes, including:  TC joint mobs - gentle - AP grade 3-4 joint mobs   tibfib gentle grade 1-2 joint mobs   MWM DF x30   Prone passive plantarflexion stretch [id number]     Tommy participated in gait training to improve functional mobility and safety for 00 minutes, including:  Walking in // bars with no boot on -0 to 1 UE support     Home Exercises Provided and Patient Education Provided     Education provided:   - continue with exercises at home     Written Home Exercises Provided: Patient instructed to cont prior HEP.  Exercises were reviewed and Tommy was able to demonstrate them prior to the end of the session.  Tommy demonstrated good  understanding of the education provided.     See EMR under Patient Instructions for exercises provided 9/21/2020.    Assessment     Pt tolerated treatment good today. Pt had improved gait with walking on treadmill, cued for improving step length and heel contact. Pt had improved tolerance to shuttle exercises and may benefit from standing heel raises next session. He has stretching and pain over anterior ankle with passive plantarflexion and pain anteriorly and posteriorly with closed chain dorsiflexion. However, despite pain he feels he can confidently perform these activities. Pt continues with decreased proprioception and poor balance strategies on RLE. He continues with decreased eversion/inversion and demonstrates pes planus with full weight bearing through  RLE. He had appropriate fatigue at end of session. Pt is appropriate to continue with therapy at this time.   Tommy is progressing well towards his goals.   Pt prognosis is Good.     Pt will continue to benefit from skilled outpatient physical therapy to address the deficits listed in the problem list box on initial evaluation, provide pt/family education and to maximize pt's level of independence in the home and community environment.   Pt's spiritual, cultural and educational needs considered and pt agreeable to plan of care and goals.     Anticipated barriers to physical therapy: none    Goals:   Short Term GOALS: 6 weeks. Pt agrees with goals set.   1. Patient demonstrates independence with HEP. Goal met   2. Patient demonstrates independence with Postural Awareness. Goal met   3. Patient demonstrates improved R wrist motion by 10 degrees in all directions to improve gripping and manipulating objects. Goal met   4. Patient demonstrates increased L ankle ROM to be symmetrical with R ankle ROM in all directions to improve tolerance to functional activities pain free.  ongoing, nearly met   5. Patient demonstrates ability to walk 500 feet with no significant increase in pain to improve independence with household mobility. Ongoing, patient walking with crutches (reassess in 2 weeks)      Long Term GOALS: 12 weeks. Pt agrees with goals set. Ongoing, in progress   1. Patient demonstrates increased R wrist mobility to be symmetrical to L wrist mobility to improve tolerance to functional activities pain free. Goal met   2. Patient demonstrates increased strength BLE's to 4+/5 or greater to improve tolerance to functional activities pain free.   3. Patient demonstrates improved overall function per FOTO Ankle Survey to 35% Limitation or less.   4. Patient demonstrates ability to walk 1 mile with no discomfort or gait abnormalities to improve independence with community mobility.   5. Patient will return to work with  minimal to no modifications required to perform job independently.     Plan     Plan of care Certification: 9/21/2020 to 12/21/2020.    Closed chain exercises as able; ankle mobility and strength      Kortney Gaines, PT   11/16/2020

## 2020-11-17 ENCOUNTER — NURSE TRIAGE (OUTPATIENT)
Dept: ADMINISTRATIVE | Facility: CLINIC | Age: 50
End: 2020-11-17

## 2020-11-17 NOTE — TELEPHONE ENCOUNTER
Bone stimulator came in today. He is wanting to know where to place it. Advised triage nurse is not familiar with this to answer his question but would send a message to office requesting a call back be placed to him with the answer.     Reason for Disposition   [1] Caller requesting NON-URGENT health information AND [2] PCP's office is the best resource    Protocols used: INFORMATION ONLY CALL - NO TRIAGE-A-

## 2020-11-18 NOTE — PROGRESS NOTES
Physical Therapy Daily Treatment Note     Name: Tommy Lamar Sr.  Clinic Number: [id number]    Therapy Diagnosis:   Encounter Diagnoses   Name Primary?    Acute right ankle pain     Decreased range of motion of right ankle     Decreased strength, endurance, and mobility     Difficulty walking      Physician: Christy Workman MD    Visit Date: 11/19/2020    Physician Orders: PT Eval and Treat   Medical Diagnosis from Referral:   Closed bimalleolar fracture of right ankle with routine healing, subsequent encounter   M25.531 (ICD-10-CM) - Wrist pain, right   Evaluation Date: 9/21/2020    PN Due: 12/21/2020  Authorization Period Expiration: 12/31/2020  Plan of Care Expiration: 12/31/2020  Visit # / Visits authorized: 15/30 (+2)     Time In: 11:53a  Time Out: 12:44p  Total Billable Time: 44 minutes    Precautions: Standard and Weightbearing as tolerated; fall risk   S/P: closed bimalleolar fracture on 7/29/2020:   POW 16 by 11/18/2020  Subjective     Pt reports: he really hasn't been having any pain. Hasn't been wearing the boot. Hasn't had much swelling.    He was compliant with home exercise program.  Response to previous treatment: good  Functional change: none     Pain: 0/10 (right wrist) & 2/10 (right ankle)    Objective     Range of Motion: Ankle     Left Right   Dorsiflexion: 10 8   Plantarflexion 40 25   Inversion 25 18   Eversion 15 15      Strength: Ankle     Left Right   Gastrocnemius 5/5 3/5   Soleus 4+/5 3/5   Dorsiflexion 5/5 4+/5   Inversion 5/5 4/5   Eversion 5/5 4/5      Strength: Knee    Left Right   Knee ext 5/5 5/5   Knee flex 5/5 5/5      Strength: Hip     Left Right   Hip Flexion 4+/5 4+/5   Hip Abduction 4/5 4/5   IR 4+/5 4+/5   ER 4+/5 4/5   Hip Extension 4+/5 4+/5      Joint Mobility: decreased mobility of talocrural joint     Function:   Gait: Willard ambulated 100 feet with no boot and no crutches   Level of Assistance: independent  Patient displays decreased step length and  antalgic gait, decreased heel contact  Balance: Maintains L SLS 15 seconds with fair balance strategies       Maintains R SLS 5 second with p0or balance strategies   Stairs: occasionally non-reciprocal gait   Squat: difficulty doing full squat due to knee pain and decreased dorsiflexion     Tommy received therapeutic exercises to develop strength, endurance, ROM, flexibility, posture and core stabilization for 35 minutes including:    Upright bike  x5 min, L2  Treadmill x7 min, 1.4mph     Gastroc stretch on wedge [id number]   Soleus stretch on wedge [id number]     DF self-mobilization x20, 2 sec hold   Step ups on 6 in step x15   Step downs on 4 in x15  Lateral lunges x12 ea     Staggered heel raises x15 ea   DL heel raise up, R SL down x10  Toe raises 3x10   Soleus heel raises 3x10    Sidelying eversion/inversion, GTB x20 ea   4 way ankle with GTB 3x10   Long sitting gastroc stretch [id number]   Prone stretch for PF x45 sec    Shuttle squats 2.5 cords 2x10  Shuttle single leg squats 1.5 cords 2x10  Shuttle heel raises with deficit 1 cord 3x10  Shuttle single leg heel raises, red cord x15     Tommy participated in neuromuscular re-education activities to improve: Balance, Coordination, Sense, Proprioception and Posture for 8 minutes. The following activities were included:  No boot:   Pre-gait training for coordination and neuro-re-ed to improve DF x30   Single leg stance, red bubble on LLE, EC [id number]   Stairs (reciprocal gait pattern - focusing on improved DF) x10 rounds  Tandem stance on foam [id number] ea   Tandem walking 2x15 ft    Tommy received the following manual therapy techniques: Soft tissue Mobilization were applied to the: R ankle/foot for 8 minutes, including:  TC joint mobs - gentle - AP grade 3-4 joint mobs   tibfib grade 3-4 joint mobs   MWM DF x30      Tommy participated in gait training to improve functional mobility and safety for 00 minutes, including:  Walking in // bars with no boot on -0 to 1 UE support      Home Exercises Provided and Patient Education Provided     Education provided:   - continue with exercises at home     Written Home Exercises Provided: Patient instructed to cont prior HEP.  Exercises were reviewed and Tommy was able to demonstrate them prior to the end of the session.  Tommy demonstrated good  understanding of the education provided.     See EMR under Patient Instructions for exercises provided 9/21/2020.    Assessment     Pt tolerated treatment good today. A progress note was performed today. He has been in physical therapy for 8 weeks and he is POW 16. He continues with pain during higher level activities, but this pain is neither sharp, stabbing, numb, or tingling and feels mostly like muscle work and a stretch. He continues requiring cues for gait when walking throughout the clinic. We transferred to upright heel raises this session with increased difficulty and increased pain as compared to shuttle exercises. Worked heavily on DF and PF mobility when negotiating stairs. He lacks end range PF mobility and strength. He has good open chain DF but diminished closed chain DF. He is unable to do a single leg heel raise, but we started with eccentric lowering with single leg with much difficulty and fatigue. He continues with balance impairments and limited ability to perform single leg stance without increased reports of pain. He received bone stimulator and will begin that today. Overall he is progressing; however, due to pain and decreased compliance at home progressing is somewhat slow. He is appropriate to continue with therapy at this time and work towards goals.   Tommy is progressing well towards his goals.   Pt prognosis is Good.     Pt will continue to benefit from skilled outpatient physical therapy to address the deficits listed in the problem list box on initial evaluation, provide pt/family education and to maximize pt's level of independence in the home and community environment.    Pt's spiritual, cultural and educational needs considered and pt agreeable to plan of care and goals.     Anticipated barriers to physical therapy: none    Goals:   Short Term GOALS: 6 weeks. Pt agrees with goals set.   1. Patient demonstrates independence with HEP. Goal met   2. Patient demonstrates independence with Postural Awareness. Goal met   3. Patient demonstrates improved R wrist motion by 10 degrees in all directions to improve gripping and manipulating objects. Goal met   4. Patient demonstrates increased L ankle ROM to be symmetrical with R ankle ROM in all directions to improve tolerance to functional activities pain free.  ongoing, nearly met   5. Patient demonstrates ability to walk 500 feet with no significant increase in pain to improve independence with household mobility. Goal met      Long Term GOALS: 12 weeks. Pt agrees with goals set. Ongoing, in progress   1. Patient demonstrates increased R wrist mobility to be symmetrical to L wrist mobility to improve tolerance to functional activities pain free. Goal met   2. Patient demonstrates increased strength BLE's to 4+/5 or greater to improve tolerance to functional activities pain free.   3. Patient demonstrates improved overall function per FOTO Ankle Survey to 35% Limitation or less.   4. Patient demonstrates ability to walk 1 mile with no discomfort or gait abnormalities to improve independence with community mobility.   5. Patient will return to work with minimal to no modifications required to perform job independently.     Plan     Plan of care Certification: 9/21/2020 to 12/21/2020.    Closed chain exercises as able; ankle mobility and strength      Kortney Gaines, PT   11/19/2020

## 2020-11-19 ENCOUNTER — CLINICAL SUPPORT (OUTPATIENT)
Dept: REHABILITATION | Facility: HOSPITAL | Age: 50
End: 2020-11-19
Attending: ORTHOPAEDIC SURGERY
Payer: COMMERCIAL

## 2020-11-19 DIAGNOSIS — Z74.09 DECREASED STRENGTH, ENDURANCE, AND MOBILITY: ICD-10-CM

## 2020-11-19 DIAGNOSIS — R68.89 DECREASED STRENGTH, ENDURANCE, AND MOBILITY: ICD-10-CM

## 2020-11-19 DIAGNOSIS — R26.2 DIFFICULTY WALKING: ICD-10-CM

## 2020-11-19 DIAGNOSIS — R53.1 DECREASED STRENGTH, ENDURANCE, AND MOBILITY: ICD-10-CM

## 2020-11-19 DIAGNOSIS — M25.671 DECREASED RANGE OF MOTION OF RIGHT ANKLE: ICD-10-CM

## 2020-11-19 DIAGNOSIS — M25.571 ACUTE RIGHT ANKLE PAIN: ICD-10-CM

## 2020-11-19 PROCEDURE — 97140 MANUAL THERAPY 1/> REGIONS: CPT | Mod: PN

## 2020-11-19 PROCEDURE — 97112 NEUROMUSCULAR REEDUCATION: CPT | Mod: PN

## 2020-11-19 PROCEDURE — 97110 THERAPEUTIC EXERCISES: CPT | Mod: PN

## 2020-11-20 NOTE — PROGRESS NOTES
Physical Therapy Daily Treatment Note     Name: Tommy Lamar Sr.  Clinic Number: [id number]    Therapy Diagnosis:   Encounter Diagnoses   Name Primary?    Acute right ankle pain     Decreased range of motion of right ankle     Decreased strength, endurance, and mobility     Difficulty walking      Physician: Christy Workman MD    Visit Date: 11/23/2020    Physician Orders: PT Eval and Treat   Medical Diagnosis from Referral:   Closed bimalleolar fracture of right ankle with routine healing, subsequent encounter   M25.531 (ICD-10-CM) - Wrist pain, right   Evaluation Date: 9/21/2020    PN Due: 12/21/2020  Authorization Period Expiration: 12/31/2020  Plan of Care Expiration: 12/31/2020  Visit # / Visits authorized: 16/30 (+2)     Time In: 1:30p  Time Out: 2:23p  Total Billable Time: 53 minutes    Precautions: Standard and Weightbearing as tolerated; fall risk   S/P: closed bimalleolar fracture on 7/29/2020:   POW 17 by 11/25/2020  Subjective     Pt reports: he feels like he is getting better everyday. Tried using the bone stimulator which felt good but some pain on the inside of his ankle. He was able to climb a ladder today with no significant difficulty or pain.    He was compliant with home exercise program.  Response to previous treatment: good  Functional change: none     Pain: 0/10 (right wrist) & 2/10 (right ankle)    Objective     Tommy received therapeutic exercises to develop strength, endurance, ROM, flexibility, posture and core stabilization for 35 minutes including:    Elliptical  x5 min, L4; Elevation 7   Treadmill x5 min, 1.5mph     Gastroc stretch on wedge [id number]   Soleus stretch on wedge [id number]     DF self-mobilization x20, 2 sec hold   Step ups on 6 in step x15   Step downs on 4 in x15  Lateral lunges x12 ea     DL heel raises 2x15   DL heel raise up, R SL down x10  Toe raises 2x15   Soleus heel raises 2x15  +Heel raises with deficit 2x8    Sidelying eversion/inversion, GTB x20 ea   Long  sitting gastroc stretch [id number]   Prone stretch for PF x45 sec    Side steps, YTB 2 laps   Shuttle squats 2.5 cords 3x10  Shuttle single leg squats 1.5 cords 2x10  Shuttle single leg heel raises with deficit, red cord x15     Tommy participated in neuromuscular re-education activities to improve: Balance, Coordination, Sense, Proprioception and Posture for 8 minutes. The following activities were included:  Pre-gait training for coordination and neuro-re-ed to improve DF x30   Single leg stance [id number]   Stairs (reciprocal gait pattern - focusing on improved DF) x10 rounds  Tandem stance on foam [id number] ea   Tandem walking 2x15 ft    Tommy received the following manual therapy techniques: Soft tissue Mobilization were applied to the: R ankle/foot for 10 minutes, including:  TC joint mobs - gentle - AP grade 3-4 joint mobs   tibfib grade 3-4 joint mobs   MWM DF x30      Tommy participated in gait training to improve functional mobility and safety for 00 minutes, including:  Walking in // bars with no boot on -0 to 1 UE support     Home Exercises Provided and Patient Education Provided     Education provided:   - continue with exercises at home     Written Home Exercises Provided: Patient instructed to cont prior HEP.  Exercises were reviewed and Tommy was able to demonstrate them prior to the end of the session.  Tommy demonstrated good  understanding of the education provided.     See EMR under Patient Instructions for exercises provided 9/21/2020.    Assessment     Pt tolerated treatment good today. Pt had improved single leg stance this session, not requiring UE support or red bubble under LLE. Pt had good response to elliptical and when walking on treadmill noted decreased limping. Pt continues with decreased DF mobility during stair climbing; however, with focus this improved. Improved ability to perform all types of heel raises with no adverse reactions. Pt has increased fatigue after performance of there-ex.  Continue with POC and progress as able.   Tommy is progressing well towards his goals.   Pt prognosis is Good.     Pt will continue to benefit from skilled outpatient physical therapy to address the deficits listed in the problem list box on initial evaluation, provide pt/family education and to maximize pt's level of independence in the home and community environment.   Pt's spiritual, cultural and educational needs considered and pt agreeable to plan of care and goals.     Anticipated barriers to physical therapy: none    Goals:   Short Term GOALS: 6 weeks. Pt agrees with goals set.   1. Patient demonstrates independence with HEP. Goal met   2. Patient demonstrates independence with Postural Awareness. Goal met   3. Patient demonstrates improved R wrist motion by 10 degrees in all directions to improve gripping and manipulating objects. Goal met   4. Patient demonstrates increased L ankle ROM to be symmetrical with R ankle ROM in all directions to improve tolerance to functional activities pain free.  ongoing, nearly met   5. Patient demonstrates ability to walk 500 feet with no significant increase in pain to improve independence with household mobility. Goal met      Long Term GOALS: 12 weeks. Pt agrees with goals set. Ongoing, in progress   1. Patient demonstrates increased R wrist mobility to be symmetrical to L wrist mobility to improve tolerance to functional activities pain free. Goal met   2. Patient demonstrates increased strength BLE's to 4+/5 or greater to improve tolerance to functional activities pain free.   3. Patient demonstrates improved overall function per FOTO Ankle Survey to 35% Limitation or less.   4. Patient demonstrates ability to walk 1 mile with no discomfort or gait abnormalities to improve independence with community mobility.   5. Patient will return to work with minimal to no modifications required to perform job independently.     Plan     Plan of care Certification: 9/21/2020 to  12/21/2020.    Closed chain exercises as able; ankle mobility and strength      Kortney Gaines, PT   11/23/2020

## 2020-11-23 ENCOUNTER — CLINICAL SUPPORT (OUTPATIENT)
Dept: REHABILITATION | Facility: HOSPITAL | Age: 50
End: 2020-11-23
Attending: ORTHOPAEDIC SURGERY
Payer: COMMERCIAL

## 2020-11-23 ENCOUNTER — PATIENT MESSAGE (OUTPATIENT)
Dept: ORTHOPEDICS | Facility: CLINIC | Age: 50
End: 2020-11-23

## 2020-11-23 DIAGNOSIS — R26.2 DIFFICULTY WALKING: ICD-10-CM

## 2020-11-23 DIAGNOSIS — M25.671 DECREASED RANGE OF MOTION OF RIGHT ANKLE: ICD-10-CM

## 2020-11-23 DIAGNOSIS — R68.89 DECREASED STRENGTH, ENDURANCE, AND MOBILITY: ICD-10-CM

## 2020-11-23 DIAGNOSIS — M25.571 ACUTE RIGHT ANKLE PAIN: ICD-10-CM

## 2020-11-23 DIAGNOSIS — Z74.09 DECREASED STRENGTH, ENDURANCE, AND MOBILITY: ICD-10-CM

## 2020-11-23 DIAGNOSIS — R53.1 DECREASED STRENGTH, ENDURANCE, AND MOBILITY: ICD-10-CM

## 2020-11-23 PROCEDURE — 97112 NEUROMUSCULAR REEDUCATION: CPT | Mod: PN

## 2020-11-23 PROCEDURE — 97110 THERAPEUTIC EXERCISES: CPT | Mod: PN

## 2020-11-23 PROCEDURE — 97140 MANUAL THERAPY 1/> REGIONS: CPT | Mod: PN

## 2020-11-27 NOTE — PROGRESS NOTES
Physical Therapy Daily Treatment Note     Name: Tommy Lamar Sr.  Clinic Number: [id number]    Therapy Diagnosis:   Encounter Diagnoses   Name Primary?    Acute right ankle pain     Decreased range of motion of right ankle     Decreased strength, endurance, and mobility     Difficulty walking      Physician: Christy Workman MD    Visit Date: 11/30/2020    Physician Orders: PT Eval and Treat   Medical Diagnosis from Referral:   Closed bimalleolar fracture of right ankle with routine healing, subsequent encounter   M25.531 (ICD-10-CM) - Wrist pain, right   Evaluation Date: 9/21/2020    PN Due: 12/21/2020  Authorization Period Expiration: 12/31/2020  Plan of Care Expiration: 12/31/2020  Visit # / Visits authorized: 17/30 (+2)     Time In: 1:21p  Time Out: 2:05p  Total Billable Time: 44 minutes    Precautions: Standard and Weightbearing as tolerated; fall risk   S/P: closed bimalleolar fracture on 7/29/2020:   POW 17 by 11/25/2020  Subjective     Pt reports: he is hurting today - may be because of the weather. He states that    He was compliant with home exercise program.  Response to previous treatment: good  Functional change: none     Pain: 0/10 (right wrist) & 2/10 (right ankle)    Objective     Tommy received therapeutic exercises to develop strength, endurance, ROM, flexibility, posture and core stabilization for 30 minutes including:    Elliptical  x5 min, L4; Elevation 7   Treadmill x5 min, 1.5mph     DF self-mobilization x20, 2 sec hold   Passive PF stretch [id number]   Gastroc stretch on wedge [id number]   Soleus stretch on wedge [id number]       Step ups on 6 in step x15   Step downs on 6 in x15  Lateral lunges x12 ea   Side steps, YTB 2 laps     DL heel raises 2x15   DL heel raise up, R SL down x10 - unable to perform today due to pain   Toe raises 2x15   Soleus heel raises 2x15  Heel raises with deficit 2x10    Shuttle squats 2.5 cords 3x10  Shuttle SL squats 1.5 cords 2x10  Shuttle SL heel raises with  deficit, red cord x15     Tommy participated in neuromuscular re-education activities to improve: Balance, Coordination, Sense, Proprioception and Posture for 6 minutes. The following activities were included:    Single leg stance [id number]   Tandem stance on foam [id number] ea   Tandem walking 2x15 ft  Stairs (reciprocal gait pattern - focusing on improved DF) x10 rounds    Tommy received the following manual therapy techniques: Soft tissue Mobilization were applied to the: R ankle/foot for 8 minutes, including:    TC joint mobs - gentle - AP grade 3-4 joint mobs   tibfib grade 3-4 joint mobs   MWM DF x30      Tommy participated in gait training to improve functional mobility and safety for 00 minutes, including:  Walking in // bars with no boot on -0 to 1 UE support     Home Exercises Provided and Patient Education Provided     Education provided:   - continue with exercises at home     Written Home Exercises Provided: Patient instructed to cont prior HEP.  Exercises were reviewed and Tommy was able to demonstrate them prior to the end of the session.  Tommy demonstrated good  understanding of the education provided.     See EMR under Patient Instructions for exercises provided 9/21/2020.    Assessment     Pt tolerated treatment well today. He presented with increased pain as compared to previous sessions; however, after performing elliptical he felt improvements in pain and mobility. He demonstrated increased power during heel raises and improved ability for dorsiflexion during heel raise with deficit. Unable to perform single leg lowering due to intolerance of pain; although he was able to perform this last session with no problems. Pt had improved talocrural joint mobility this session. He is appropriate to continue with therapy at this time and progress as able.   Tommy is progressing well towards his goals.   Pt prognosis is Good.     Pt will continue to benefit from skilled outpatient physical therapy to address the  deficits listed in the problem list box on initial evaluation, provide pt/family education and to maximize pt's level of independence in the home and community environment.   Pt's spiritual, cultural and educational needs considered and pt agreeable to plan of care and goals.     Anticipated barriers to physical therapy: none    Goals:   Short Term GOALS: 6 weeks. Pt agrees with goals set.   1. Patient demonstrates independence with HEP. Goal met   2. Patient demonstrates independence with Postural Awareness. Goal met   3. Patient demonstrates improved R wrist motion by 10 degrees in all directions to improve gripping and manipulating objects. Goal met   4. Patient demonstrates increased L ankle ROM to be symmetrical with R ankle ROM in all directions to improve tolerance to functional activities pain free.  ongoing, nearly met   5. Patient demonstrates ability to walk 500 feet with no significant increase in pain to improve independence with household mobility. Goal met      Long Term GOALS: 12 weeks. Pt agrees with goals set. Ongoing, in progress   1. Patient demonstrates increased R wrist mobility to be symmetrical to L wrist mobility to improve tolerance to functional activities pain free. Goal met   2. Patient demonstrates increased strength BLE's to 4+/5 or greater to improve tolerance to functional activities pain free.   3. Patient demonstrates improved overall function per FOTO Ankle Survey to 35% Limitation or less.   4. Patient demonstrates ability to walk 1 mile with no discomfort or gait abnormalities to improve independence with community mobility.   5. Patient will return to work with minimal to no modifications required to perform job independently.     Plan     Plan of care Certification: 9/21/2020 to 12/21/2020.    Closed chain exercises; ankle mobility and strength; proprioception exercises       Kortney Gaines, PT   11/30/2020

## 2020-11-30 ENCOUNTER — CLINICAL SUPPORT (OUTPATIENT)
Dept: REHABILITATION | Facility: HOSPITAL | Age: 50
End: 2020-11-30
Attending: ORTHOPAEDIC SURGERY
Payer: COMMERCIAL

## 2020-11-30 DIAGNOSIS — R68.89 DECREASED STRENGTH, ENDURANCE, AND MOBILITY: ICD-10-CM

## 2020-11-30 DIAGNOSIS — R53.1 DECREASED STRENGTH, ENDURANCE, AND MOBILITY: ICD-10-CM

## 2020-11-30 DIAGNOSIS — Z74.09 DECREASED STRENGTH, ENDURANCE, AND MOBILITY: ICD-10-CM

## 2020-11-30 DIAGNOSIS — M25.571 ACUTE RIGHT ANKLE PAIN: ICD-10-CM

## 2020-11-30 DIAGNOSIS — M25.671 DECREASED RANGE OF MOTION OF RIGHT ANKLE: ICD-10-CM

## 2020-11-30 DIAGNOSIS — R26.2 DIFFICULTY WALKING: ICD-10-CM

## 2020-11-30 PROCEDURE — 97140 MANUAL THERAPY 1/> REGIONS: CPT | Mod: PN

## 2020-11-30 PROCEDURE — 97110 THERAPEUTIC EXERCISES: CPT | Mod: PN

## 2020-12-02 NOTE — PROGRESS NOTES
Physical Therapy Daily Treatment Note     Name: Tommy Lamar Sr.  Clinic Number: [id number]    Therapy Diagnosis:   Encounter Diagnoses   Name Primary?    Acute right ankle pain     Decreased range of motion of right ankle     Decreased strength, endurance, and mobility     Difficulty walking      Physician: Christy Workman MD    Visit Date: 12/3/2020    Physician Orders: PT Eval and Treat   Medical Diagnosis from Referral:   Closed bimalleolar fracture of right ankle with routine healing, subsequent encounter   M25.531 (ICD-10-CM) - Wrist pain, right   Evaluation Date: 9/21/2020    PN Due: 12/21/2020  Authorization Period Expiration: 12/31/2020  Plan of Care Expiration: 12/31/2020  Visit # / Visits authorized: 18/30 (+2)     Time In: 3:45p  Time Out: 4:28p  Total Billable Time: 43 minutes    Precautions: Standard and Weightbearing as tolerated; fall risk   S/P: closed bimalleolar fracture on 7/29/2020:   POW 17 by 11/25/2020  Subjective     Pt reports: he had a lot of calf burn after last time and it just started getting better. He is feeling like each day it is getting better. His right wrist is starting to hurt again.    He was compliant with home exercise program.  Response to previous treatment: good  Functional change: none     Pain: 2/10 (right wrist) & 2/10 (right ankle)    Objective     Tommy received therapeutic exercises to develop strength, endurance, ROM, flexibility, posture and core stabilization for 33 minutes including:    Elliptical  x5 min, L4; Elevation 7   Treadmill x6 min, 2.0mph     DF self-mobilization x20, 2 sec hold   Passive PF stretch [id number]   Gastroc stretch on wedge [id number]   Soleus stretch on wedge [id number]       Step ups on 6 in step x15   Step downs on 6 in x15  Lunges x12   Side steps, YTB 2 laps   +Monster walks, YTB 2 laps     DL heel raises 2x15   DL heel raise up, R SL down x10   Toe raises 2x15   Soleus heel raises 2x15  Heel raises with deficit x10    Shuttle squats  2.5 cords 3x10  Shuttle SL squats 1.5 cords 2x10  Shuttle SL heel raises with deficit, black cord 2x12  +Shuttle SL heel raise isometric x5, 10 sec hold   +Sled push 2 laps of 60 ft      Tommy participated in neuromuscular re-education activities to improve: Balance, Coordination, Sense, Proprioception and Posture for 10 minutes. The following activities were included:    Single leg stance [id number]   Tandem stance on foam [id number] ea   Tandem stance to rebounder x30   Rockerboard - no UE x1 min ea     Tommy received the following manual therapy techniques: Soft tissue Mobilization were applied to the: R ankle/foot for 00 minutes, including:    TC joint mobs - gentle - AP grade 3-4 joint mobs   tibfib grade 3-4 joint mobs   MWM DF x30      Tommy participated in gait training to improve functional mobility and safety for 00 minutes, including:  Walking in // bars with no boot on -0 to 1 UE support     Home Exercises Provided and Patient Education Provided     Education provided:   - continue with exercises at home     Written Home Exercises Provided: Patient instructed to cont prior HEP.  Exercises were reviewed and Tommy was able to demonstrate them prior to the end of the session.  Tommy demonstrated good  understanding of the education provided.     See EMR under Patient Instructions for exercises provided 9/21/2020.    Assessment     Pt tolerated treatment well today. Pt had much improved single leg heel raise with deficit on shuttle; however, continues with significant difficulty doing this on ground. With cueing for decreasing ROM and holding onto a stable object he had some improvements. His pain with single leg heel raises is at his MT heads. His posterior ankle pain is improving, but he feels it most with soleus stretch and repetitive heel raises. He continues lacking end range plantarflexion strength. He was able to tolerate new there-ex and neuromuscular re-education activities this session. Plan to perform soft  tissue work next session to improve tightness and dysfunction through calf and achilles tendon. Pt would continue to benefit from skilled intervention to improve calf strength, single limb balance, and gait mechanics.   Tommy is progressing well towards his goals.   Pt prognosis is Good.     Pt will continue to benefit from skilled outpatient physical therapy to address the deficits listed in the problem list box on initial evaluation, provide pt/family education and to maximize pt's level of independence in the home and community environment.   Pt's spiritual, cultural and educational needs considered and pt agreeable to plan of care and goals.     Anticipated barriers to physical therapy: none    Goals:   Short Term GOALS: 6 weeks. Pt agrees with goals set.   1. Patient demonstrates independence with HEP. Goal met   2. Patient demonstrates independence with Postural Awareness. Goal met   3. Patient demonstrates improved R wrist motion by 10 degrees in all directions to improve gripping and manipulating objects. Goal met   4. Patient demonstrates increased L ankle ROM to be symmetrical with R ankle ROM in all directions to improve tolerance to functional activities pain free.  ongoing, nearly met   5. Patient demonstrates ability to walk 500 feet with no significant increase in pain to improve independence with household mobility. Goal met      Long Term GOALS: 12 weeks. Pt agrees with goals set. Ongoing, in progress   1. Patient demonstrates increased R wrist mobility to be symmetrical to L wrist mobility to improve tolerance to functional activities pain free. Goal met   2. Patient demonstrates increased strength BLE's to 4+/5 or greater to improve tolerance to functional activities pain free.   3. Patient demonstrates improved overall function per FOTO Ankle Survey to 35% Limitation or less.   4. Patient demonstrates ability to walk 1 mile with no discomfort or gait abnormalities to improve independence with  community mobility.   5. Patient will return to work with minimal to no modifications required to perform job independently.     Plan     Plan of care Certification: 9/21/2020 to 12/21/2020.    Closed chain exercises; ankle mobility and strength; proprioception exercises       Kortney Gaines, PT   12/03/2020

## 2020-12-03 ENCOUNTER — CLINICAL SUPPORT (OUTPATIENT)
Dept: REHABILITATION | Facility: HOSPITAL | Age: 50
End: 2020-12-03
Attending: ORTHOPAEDIC SURGERY
Payer: COMMERCIAL

## 2020-12-03 DIAGNOSIS — M25.671 DECREASED RANGE OF MOTION OF RIGHT ANKLE: ICD-10-CM

## 2020-12-03 DIAGNOSIS — R68.89 DECREASED STRENGTH, ENDURANCE, AND MOBILITY: ICD-10-CM

## 2020-12-03 DIAGNOSIS — R53.1 DECREASED STRENGTH, ENDURANCE, AND MOBILITY: ICD-10-CM

## 2020-12-03 DIAGNOSIS — M25.571 ACUTE RIGHT ANKLE PAIN: ICD-10-CM

## 2020-12-03 DIAGNOSIS — Z74.09 DECREASED STRENGTH, ENDURANCE, AND MOBILITY: ICD-10-CM

## 2020-12-03 DIAGNOSIS — R26.2 DIFFICULTY WALKING: ICD-10-CM

## 2020-12-03 PROCEDURE — 97112 NEUROMUSCULAR REEDUCATION: CPT | Mod: PN

## 2020-12-03 PROCEDURE — 97110 THERAPEUTIC EXERCISES: CPT | Mod: PN

## 2020-12-07 ENCOUNTER — CLINICAL SUPPORT (OUTPATIENT)
Dept: REHABILITATION | Facility: HOSPITAL | Age: 50
End: 2020-12-07
Attending: ORTHOPAEDIC SURGERY
Payer: COMMERCIAL

## 2020-12-07 DIAGNOSIS — M25.571 ACUTE RIGHT ANKLE PAIN: Primary | ICD-10-CM

## 2020-12-07 DIAGNOSIS — Z74.09 DECREASED STRENGTH, ENDURANCE, AND MOBILITY: ICD-10-CM

## 2020-12-07 DIAGNOSIS — R53.1 DECREASED STRENGTH, ENDURANCE, AND MOBILITY: ICD-10-CM

## 2020-12-07 DIAGNOSIS — M25.671 DECREASED RANGE OF MOTION OF RIGHT ANKLE: ICD-10-CM

## 2020-12-07 DIAGNOSIS — R26.2 DIFFICULTY WALKING: ICD-10-CM

## 2020-12-07 DIAGNOSIS — R68.89 DECREASED STRENGTH, ENDURANCE, AND MOBILITY: ICD-10-CM

## 2020-12-07 PROCEDURE — 97110 THERAPEUTIC EXERCISES: CPT | Mod: PN,CQ

## 2020-12-07 PROCEDURE — 97140 MANUAL THERAPY 1/> REGIONS: CPT | Mod: PN,CQ

## 2020-12-07 NOTE — PROGRESS NOTES
Physical Therapy Daily Treatment Note     Name: Tommy Lamar Sr.  Clinic Number: [id number]    Therapy Diagnosis:   Encounter Diagnoses   Name Primary?    Acute right ankle pain Yes    Decreased range of motion of right ankle     Decreased strength, endurance, and mobility     Difficulty walking      Physician: Christy Workman MD    Visit Date: 12/7/2020    Physician Orders: PT Eval and Treat   Medical Diagnosis from Referral:   Closed bimalleolar fracture of right ankle with routine healing, subsequent encounter   M25.531 (ICD-10-CM) - Wrist pain, right   Evaluation Date: 9/21/2020    PN Due: 12/21/2020  Authorization Period Expiration: 12/31/2020  Plan of Care Expiration: 12/31/2020  Visit # / Visits authorized: 19/30 (+2)     Time In: 3:45p  Time Out: 4:30p  Total Billable Time: 45 minutes    Precautions: Standard and Weightbearing as tolerated; fall risk   S/P: closed bimalleolar fracture on 7/29/2020:   POW 19 by 11/25/2020  Subjective     Pt reports: he feels good, but still has some tightness.   He was compliant with home exercise program.  Response to previous treatment: good  Functional change: none     Pain: 1/10    Objective     Tommy received therapeutic exercises to develop strength, endurance, ROM, flexibility, posture and core stabilization for 25 minutes including:    Bold=Exercises Performed    Upright Bike 5min (had slippers on)  Gastroc stretch on wedge [id number]   Soleus stretch on wedge [id number]   Heel raises with deficit 2x10    Step ups on 6 in step x15    +Monster walks, YTB 2 laps   Shuttle SL heel raises with deficit, black cord 2x12  Shuttle SL squats 1.5 cords 2x12    NP:+Shuttle SL heel raise isometric x5, 10 sec hold  Lunges x12   Side steps, YTB 2 laps  Elliptical  x5 min, L4; Elevation 7   Treadmill x6 min, 2.0mph   +Sled push 2 laps of 60 ft   Soleus heel raises 2x15  Step downs on 6 in x15  DL heel raises 2x15   DL heel raise up, R SL down x10   Toe raises 2x15   DF  self-mobilization x20, 2 sec hold   Passive PF stretch [id number]  Shuttle squats 2.5 cords 3x10    Tommy participated in neuromuscular re-education activities to improve: Balance, Coordination, Sense, Proprioception and Posture for 00 minutes. The following activities were included:    Single leg stance [id number]   Tandem stance on foam 3x17zwk ea   Tandem stance to rebounder x30   Rockerboard - no UE x1 min ea     Tommy received the following manual therapy techniques: Soft tissue Mobilization were applied to the: R ankle/foot for 20 minutes, including:  STM to gastroc and achilles  IASTM to Scar and surrounding areas  TC joint mobs - gentle - AP grade 3-4 joint mobs   tibfib grade 3-4 joint mobs   MWM DF x30      Tommy participated in gait training to improve functional mobility and safety for 00 minutes, including:  Walking in // bars with no boot on -0 to 1 UE support     Home Exercises Provided and Patient Education Provided     Education provided:   - continue with exercises at home     Written Home Exercises Provided: Patient instructed to cont prior HEP.  Exercises were reviewed and Tommy was able to demonstrate them prior to the end of the session.  Tommy demonstrated good  understanding of the education provided.     See EMR under Patient Instructions for exercises provided 9/21/2020.    Assessment   Tommy tolerated the above therex well today with minimal complaints of pain. Pt presented to therapy session without supportive shoes, so for safety the upright bike was done in place of the treadmill/eliptical. MT was done after warm up and pt responded well, reporting that his ankle felt looser. Pt was also able to complete heel raises on shuttle with minimal pain. Post tx pt stretched calf on incline wedge and most tightness began to subside. Overall, pt is progressing and will continue to benefit from PT services to improve R ankle ROM, strength, functional mobility.     **Pt was given blue and black theraband to  perform resisted plantar flexion at home as tolerated.    Tommy is progressing well towards his goals.   Pt prognosis is Good.     Pt will continue to benefit from skilled outpatient physical therapy to address the deficits listed in the problem list box on initial evaluation, provide pt/family education and to maximize pt's level of independence in the home and community environment.   Pt's spiritual, cultural and educational needs considered and pt agreeable to plan of care and goals.     Anticipated barriers to physical therapy: none    Goals:   Short Term GOALS: 6 weeks. Pt agrees with goals set.   1. Patient demonstrates independence with HEP. Goal met   2. Patient demonstrates independence with Postural Awareness. Goal met   3. Patient demonstrates improved R wrist motion by 10 degrees in all directions to improve gripping and manipulating objects. Goal met   4. Patient demonstrates increased L ankle ROM to be symmetrical with R ankle ROM in all directions to improve tolerance to functional activities pain free.  ongoing, nearly met   5. Patient demonstrates ability to walk 500 feet with no significant increase in pain to improve independence with household mobility. Goal met      Long Term GOALS: 12 weeks. Pt agrees with goals set. Ongoing, in progress   1. Patient demonstrates increased R wrist mobility to be symmetrical to L wrist mobility to improve tolerance to functional activities pain free. Goal met   2. Patient demonstrates increased strength BLE's to 4+/5 or greater to improve tolerance to functional activities pain free.   3. Patient demonstrates improved overall function per FOTO Ankle Survey to 35% Limitation or less.   4. Patient demonstrates ability to walk 1 mile with no discomfort or gait abnormalities to improve independence with community mobility.   5. Patient will return to work with minimal to no modifications required to perform job independently.     Plan     Plan of care Certification:  9/21/2020 to 12/21/2020.    Closed chain exercises; ankle mobility and strength; proprioception exercises       Mary Coffey, JAN   12/07/2020

## 2020-12-10 ENCOUNTER — CLINICAL SUPPORT (OUTPATIENT)
Dept: REHABILITATION | Facility: HOSPITAL | Age: 50
End: 2020-12-10
Attending: ORTHOPAEDIC SURGERY
Payer: COMMERCIAL

## 2020-12-10 DIAGNOSIS — M25.571 ACUTE RIGHT ANKLE PAIN: ICD-10-CM

## 2020-12-10 DIAGNOSIS — R68.89 DECREASED STRENGTH, ENDURANCE, AND MOBILITY: ICD-10-CM

## 2020-12-10 DIAGNOSIS — M25.671 DECREASED RANGE OF MOTION OF RIGHT ANKLE: ICD-10-CM

## 2020-12-10 DIAGNOSIS — R53.1 DECREASED STRENGTH, ENDURANCE, AND MOBILITY: ICD-10-CM

## 2020-12-10 DIAGNOSIS — R26.2 DIFFICULTY WALKING: ICD-10-CM

## 2020-12-10 DIAGNOSIS — Z74.09 DECREASED STRENGTH, ENDURANCE, AND MOBILITY: ICD-10-CM

## 2020-12-10 PROCEDURE — 97110 THERAPEUTIC EXERCISES: CPT | Mod: PN

## 2020-12-10 PROCEDURE — 97140 MANUAL THERAPY 1/> REGIONS: CPT | Mod: PN

## 2020-12-16 NOTE — PROGRESS NOTES
Physical Therapy Daily Treatment Note     Name: Tommy Lamar Sr.  Clinic Number: [id number]    Therapy Diagnosis:   Encounter Diagnoses   Name Primary?    Acute right ankle pain     Decreased range of motion of right ankle     Decreased strength, endurance, and mobility     Difficulty walking      Physician: Christy Workman MD    Visit Date: 12/17/2020    Physician Orders: PT Eval and Treat   Medical Diagnosis from Referral:   Closed bimalleolar fracture of right ankle with routine healing, subsequent encounter   M25.531 (ICD-10-CM) - Wrist pain, right   Evaluation Date: 9/21/2020    PN Due: 1/19/2021  Authorization Period Expiration: 12/31/2020  Plan of Care Expiration: 12/31/2020  Visit # / Visits authorized: 21/30 (+2)     Time In: 3:35p (10 minutes with tech)   Time Out: 4:29p  Total Billable Time: 45 minutes    Precautions: Standard and Weightbearing as tolerated; fall risk   S/P: closed bimalleolar fracture on 7/29/2020:   POW 22 by 12/16/2020  Subjective     Pt reports: he missed last appointment because he had gout in his L big toe and he knew he wasn't going to be able to do much. He is feeling great though.    He was compliant with home exercise program.  Response to previous treatment: good  Functional change: none     Pain: 1/10    Objective     Range of Motion: Ankle     Left Right   Dorsiflexion: 10 10 (passive 12)   Plantarflexion 40 30    Inversion 25 20 (passive 25)   Eversion 15 15      Strength: Ankle     Left Right   Gastrocnemius 5/5 3+/5   Soleus 4+/5 3+/5   Dorsiflexion 5/5 5/5   Inversion 5/5 4+/5   Eversion 5/5 4/5      Strength: Knee    Left Right   Knee ext 5/5 5/5   Knee flex 5/5 5/5      Strength: Hip     Left Right   Hip Flexion 4+/5 4+/5   Hip Abduction 4/5 4/5   IR 4+/5 4+/5   ER 4+/5 4+/5   Hip Extension 4+/5 4+/5      Joint Mobility: decreased mobility of talocrural joint      Function:   Gait: Willard ambulated 100 feet with no boot and no crutches   Level of  Assistance: independent  Patient displays decreased step length and decreased heel contact  Balance: Maintains L SLS 15 seconds with fair balance strategies                  Maintains R SLS 12 second with poor balance strategies   Stairs: occasionally non-reciprocal gait   Squat: difficulty doing full squat due to knee pain and decreased dorsiflexion     Tommy received therapeutic exercises to develop strength, endurance, ROM, flexibility, posture and core stabilization for 43 minutes including:    Bold=Exercises Performed    Elliptical  x5 min, L4; Elevation 7   Treadmill x6 min 2.0mph     Gastroc stretch on wedge [id number]   Soleus stretch on wedge [id number]   Plantarflexion stretch in kneeling [id number]   Staggered heel raises with focus on pushing up through RLE x15  Heel raises with deficit 2x10  Soleus heel raises 2x15     Step ups on 4 in step with foam x15 (RLE)   Step up and over on 4 in step with RLE x15   Shuttle SL heel raises with deficit, 1.5 cords 2x12  Shuttle DL heel raise with SL lowering, 1.5 cords x20   Shuttle SL soleus raises with deficit, black cord x15  Shuttle SL squats 1.5 cords 2x12    Monster walks, RTB 2 laps   Side steps, RTB 2 laps   Sled push, 2 laps of 60ft     Tommy participated in neuromuscular re-education activities to improve: Balance, Coordination, Sense, Proprioception and Posture for 12 minutes. The following activities were included:    Single leg stance [id number]   Tandem stance on foam [id number] ea   Tandem stance to rebounder x30   Walking on trampoline x30  Rockerboard - no UE x1 min ea     Tommy received the following manual therapy techniques: Soft tissue Mobilization were applied to the: R ankle/foot for 00 minutes, including:  STM to gastroc and achilles  STM with instrument to Scar and surrounding areas  TC joint mobs - gentle - AP grade 3-4 joint mobs   tibfib grade 3-4 joint mobs   MWM DF x30      Tommy participated in gait training to improve functional mobility and safety for  00 minutes, including:  Walking in // bars with no boot on -0 to 1 UE support     Home Exercises Provided and Patient Education Provided     Education provided:   - continue with exercises at home     Written Home Exercises Provided: Patient instructed to cont prior HEP.  Exercises were reviewed and Tommy was able to demonstrate them prior to the end of the session.  Tommy demonstrated good  understanding of the education provided.     See EMR under Patient Instructions for exercises provided 9/21/2020.    Assessment     Pt tolerated therapy session very well today. A progress note was performed today. Pt has improved his ankle mobility, ankle strength, balance, and gait. He continues improving with pain during functional mobility. He continues lacking plantarflexion and gastroc/soleus strength. Pain limits him from performing single leg calf raises in standing; however, he can perform this on shuttle with increased resistance today. He has improved his ambulation and negotiation of stairs. Most pain is on lateral edges of achilles tendon with excessive use of gastroc complex such as during step downs, sled push, and tandem stance. Pt able to deep squat and lift 20# item with no issues and no compensation exception of slight hip ER. Due to patient lacking full mobility, functional strength, and mild antalgic gait, pt is appropriate to continue with therapy. Pt is going to follow up with doctor on 21st and will follow up afterwards. Continue with POC and progress as able.     Tommy is progressing well towards his goals.   Pt prognosis is Good.     Pt will continue to benefit from skilled outpatient physical therapy to address the deficits listed in the problem list box on initial evaluation, provide pt/family education and to maximize pt's level of independence in the home and community environment.   Pt's spiritual, cultural and educational needs considered and pt agreeable to plan of care and goals.     Anticipated  barriers to physical therapy: none    Goals:   Short Term GOALS: 6 weeks. Pt agrees with goals set.   1. Patient demonstrates independence with HEP. Goal met   2. Patient demonstrates independence with Postural Awareness. Goal met   3. Patient demonstrates improved R wrist motion by 10 degrees in all directions to improve gripping and manipulating objects. Goal met   4. Patient demonstrates increased L ankle ROM to be symmetrical with R ankle ROM in all directions to improve tolerance to functional activities pain free.  ongoing, nearly met   5. Patient demonstrates ability to walk 500 feet with no significant increase in pain to improve independence with household mobility. Goal met      Long Term GOALS: 12 weeks. Pt agrees with goals set. Ongoing, in progress   1. Patient demonstrates increased R wrist mobility to be symmetrical to L wrist mobility to improve tolerance to functional activities pain free. Goal met   2. Patient demonstrates increased strength BLE's to 4+/5 or greater to improve tolerance to functional activities pain free.   3. Patient demonstrates improved overall function per FOTO Ankle Survey to 35% Limitation or less.   4. Patient demonstrates ability to walk 1 mile with no discomfort or gait abnormalities to improve independence with community mobility.   5. Patient will return to work with minimal to no modifications required to perform job independently.     Plan     Plan of care Certification: 9/21/2020 to 12/21/2020.    Closed chain exercises; ankle mobility and strength; proprioception exercises       Kortney Gaines, PT   12/17/2020

## 2020-12-17 ENCOUNTER — CLINICAL SUPPORT (OUTPATIENT)
Dept: REHABILITATION | Facility: HOSPITAL | Age: 50
End: 2020-12-17
Attending: ORTHOPAEDIC SURGERY
Payer: COMMERCIAL

## 2020-12-17 DIAGNOSIS — M25.671 DECREASED RANGE OF MOTION OF RIGHT ANKLE: ICD-10-CM

## 2020-12-17 DIAGNOSIS — Z74.09 DECREASED STRENGTH, ENDURANCE, AND MOBILITY: ICD-10-CM

## 2020-12-17 DIAGNOSIS — R53.1 DECREASED STRENGTH, ENDURANCE, AND MOBILITY: ICD-10-CM

## 2020-12-17 DIAGNOSIS — R26.2 DIFFICULTY WALKING: ICD-10-CM

## 2020-12-17 DIAGNOSIS — R68.89 DECREASED STRENGTH, ENDURANCE, AND MOBILITY: ICD-10-CM

## 2020-12-17 DIAGNOSIS — M25.571 ACUTE RIGHT ANKLE PAIN: ICD-10-CM

## 2020-12-17 PROCEDURE — 97110 THERAPEUTIC EXERCISES: CPT | Mod: PN

## 2020-12-17 PROCEDURE — 97112 NEUROMUSCULAR REEDUCATION: CPT | Mod: PN

## 2020-12-21 ENCOUNTER — OFFICE VISIT (OUTPATIENT)
Dept: ORTHOPEDICS | Facility: CLINIC | Age: 50
End: 2020-12-21
Payer: COMMERCIAL

## 2020-12-21 VITALS
HEART RATE: 97 BPM | HEIGHT: 68 IN | DIASTOLIC BLOOD PRESSURE: 80 MMHG | WEIGHT: 212.94 LBS | SYSTOLIC BLOOD PRESSURE: 112 MMHG | BODY MASS INDEX: 32.27 KG/M2 | OXYGEN SATURATION: 97 % | RESPIRATION RATE: 18 BRPM

## 2020-12-21 DIAGNOSIS — S82.841D CLOSED BIMALLEOLAR FRACTURE OF RIGHT ANKLE WITH ROUTINE HEALING, SUBSEQUENT ENCOUNTER: Primary | ICD-10-CM

## 2020-12-21 PROCEDURE — 3079F PR MOST RECENT DIASTOLIC BLOOD PRESSURE 80-89 MM HG: ICD-10-PCS | Mod: CPTII,S$GLB,, | Performed by: ORTHOPAEDIC SURGERY

## 2020-12-21 PROCEDURE — 99999 PR PBB SHADOW E&M-EST. PATIENT-LVL IV: ICD-10-PCS | Mod: PBBFAC,,, | Performed by: ORTHOPAEDIC SURGERY

## 2020-12-21 PROCEDURE — 99999 PR PBB SHADOW E&M-EST. PATIENT-LVL IV: CPT | Mod: PBBFAC,,, | Performed by: ORTHOPAEDIC SURGERY

## 2020-12-21 PROCEDURE — 3008F BODY MASS INDEX DOCD: CPT | Mod: CPTII,S$GLB,, | Performed by: ORTHOPAEDIC SURGERY

## 2020-12-21 PROCEDURE — 3074F SYST BP LT 130 MM HG: CPT | Mod: CPTII,S$GLB,, | Performed by: ORTHOPAEDIC SURGERY

## 2020-12-21 PROCEDURE — 99213 OFFICE O/P EST LOW 20 MIN: CPT | Mod: S$GLB,,, | Performed by: ORTHOPAEDIC SURGERY

## 2020-12-21 PROCEDURE — 3008F PR BODY MASS INDEX (BMI) DOCUMENTED: ICD-10-PCS | Mod: CPTII,S$GLB,, | Performed by: ORTHOPAEDIC SURGERY

## 2020-12-21 PROCEDURE — 1126F PR PAIN SEVERITY QUANTIFIED, NO PAIN PRESENT: ICD-10-PCS | Mod: S$GLB,,, | Performed by: ORTHOPAEDIC SURGERY

## 2020-12-21 PROCEDURE — 3079F DIAST BP 80-89 MM HG: CPT | Mod: CPTII,S$GLB,, | Performed by: ORTHOPAEDIC SURGERY

## 2020-12-21 PROCEDURE — 3074F PR MOST RECENT SYSTOLIC BLOOD PRESSURE < 130 MM HG: ICD-10-PCS | Mod: CPTII,S$GLB,, | Performed by: ORTHOPAEDIC SURGERY

## 2020-12-21 PROCEDURE — 1126F AMNT PAIN NOTED NONE PRSNT: CPT | Mod: S$GLB,,, | Performed by: ORTHOPAEDIC SURGERY

## 2020-12-21 PROCEDURE — 99213 PR OFFICE/OUTPT VISIT, EST, LEVL III, 20-29 MIN: ICD-10-PCS | Mod: S$GLB,,, | Performed by: ORTHOPAEDIC SURGERY

## 2020-12-21 NOTE — PROGRESS NOTES
"Postoperative Visit     History of Present Illness:   Tommy is 5 months s/p ORIF R medial and lateral mal (DOS-7/29/20)  Feel that PT and OT are helpinf  Pain resolved  Out of boot     Physical Examination:    Vitals:    12/21/20 0743   BP: 112/80   Pulse: 97   Resp: 18   SpO2: 97%   Weight: 96.6 kg (212 lb 15.4 oz)   Height: 5' 8" (1.727 m)   PainSc: 0-No pain   PainLoc: Ankle        NAD  right lower extremity:   Incisions over the ankle  well healed   Non tender over medial and lateral mal, no pain w percussion   Edge of medial mal plate is prominent but is not painful, no skin complications  DF to neutral   Ltsi s/s/sp/dp/t  + ehl/fhl/ta/gs  2+ DP     Imaging: 3 views R ankle: no displacement of fracture. Minimal callus present at posterior medial mal fracture site, no change compared to previous films      Assessment/Plan:  50 y.o. male 5 months s/p ORIF R medial and lateral mal (DOS-7/29/20)     Plan  1. Continue bont stim x 4 weeks  2. WBAT RLE  3. RTC 6 weeks     All questions were answered in detail. The patient is in full agreement with the treatment plan and will proceed accordingly.      "

## 2020-12-28 ENCOUNTER — CLINICAL SUPPORT (OUTPATIENT)
Dept: REHABILITATION | Facility: HOSPITAL | Age: 50
End: 2020-12-28
Attending: ORTHOPAEDIC SURGERY
Payer: COMMERCIAL

## 2020-12-28 DIAGNOSIS — M25.671 DECREASED RANGE OF MOTION OF RIGHT ANKLE: ICD-10-CM

## 2020-12-28 DIAGNOSIS — R26.2 DIFFICULTY WALKING: ICD-10-CM

## 2020-12-28 DIAGNOSIS — M25.571 ACUTE RIGHT ANKLE PAIN: ICD-10-CM

## 2020-12-28 DIAGNOSIS — R68.89 DECREASED STRENGTH, ENDURANCE, AND MOBILITY: ICD-10-CM

## 2020-12-28 DIAGNOSIS — R53.1 DECREASED STRENGTH, ENDURANCE, AND MOBILITY: ICD-10-CM

## 2020-12-28 DIAGNOSIS — Z74.09 DECREASED STRENGTH, ENDURANCE, AND MOBILITY: ICD-10-CM

## 2020-12-28 PROCEDURE — 97112 NEUROMUSCULAR REEDUCATION: CPT | Mod: PN

## 2020-12-28 PROCEDURE — 97110 THERAPEUTIC EXERCISES: CPT | Mod: PN

## 2020-12-28 NOTE — PROGRESS NOTES
Physical Therapy Daily Treatment Note     Name: Tommy Lamar Sr.  Clinic Number: [id number]    Therapy Diagnosis:   Encounter Diagnoses   Name Primary?    Acute right ankle pain     Decreased range of motion of right ankle     Decreased strength, endurance, and mobility     Difficulty walking      Physician: Christy Workman MD    Visit Date: 12/28/2020    Physician Orders: PT Eval and Treat   Medical Diagnosis from Referral:   Closed bimalleolar fracture of right ankle with routine healing, subsequent encounter   M25.531 (ICD-10-CM) - Wrist pain, right   Evaluation Date: 9/21/2020    PN Due: 1/19/2021  Authorization Period Expiration: 12/31/2020 updated to 3/1/2021  Plan of Care Expiration: 12/31/2020  Visit # / Visits authorized: 22/30 (+2)     Time In: 3:36p  Time Out: 4:15p  Total Billable Time: 39 minutes    Precautions: Standard and Weightbearing as tolerated; fall risk   S/P: closed bimalleolar fracture on 7/29/2020:   POW 22 by 12/16/2020  Subjective     Pt reports: he is doing much better, he had to cancel his other appointments because he was sick and is still fighting it but has been on antibiotics and has about 3 more days of that.  Pt had a follow up with doctor last Monday and he was told his bones are healing and to continue with therapy and bone stimulator   He was compliant with home exercise program.  Response to previous treatment: good  Functional change: none     Pain: 0/10    Objective     Tommy received therapeutic exercises to develop strength, endurance, ROM, flexibility, posture and core stabilization for 27 minutes including:    Bold=Exercises Performed    Elliptical  x5 min, L4; Elevation 8   Treadmill x5 min 2.3mph     Gastroc stretch on wedge 8u52cms   Soleus stretch on wedge [id number]   Plantarflexion stretch in kneeling [id number]   Heel raises with deficit 2x10  Soleus heel raises 2x15     Step ups on 4 in step with foam x15 (RLE)   Step up and over on 4 in step with RLE x15    Shuttle DL heel raise with SL lowering, 1.5 cords x20   Shuttle SL squats 1.5 cords 2x12    +Sit to stand into pre-jump stance x15   +Trampoline hops lateral and front to back x45 sec ea   Monster walks, GTB 2 laps   Side steps, GTB 2 laps   Sled push, 2 laps of 60ft     Tommy participated in neuromuscular re-education activities to improve: Balance, Coordination, Sense, Proprioception and Posture for 12 minutes. The following activities were included:    Single leg stance [id number]   +SLS on foam x30 sec   Tandem stance on foam [id number] ea   Tandem stance to rebounder x30   Walking on trampoline x30  Rockerboard - no UE x1 min ea     Tommy received the following manual therapy techniques: Soft tissue Mobilization were applied to the: R ankle/foot for 00 minutes, including:  STM to gastroc and achilles  STM with instrument to Scar and surrounding areas  TC joint mobs - gentle - AP grade 3-4 joint mobs   tibfib grade 3-4 joint mobs   MWM DF x30      Home Exercises Provided and Patient Education Provided     Education provided:   - continue with exercises at home     Written Home Exercises Provided: Patient instructed to cont prior HEP.  Exercises were reviewed and Tommy was able to demonstrate them prior to the end of the session.  Tommy demonstrated good  understanding of the education provided.     See EMR under Patient Instructions for exercises provided 9/21/2020.    Assessment     Pt tolerated therapy session well today. Due to his sickness, patient was still feeling tired and had to leave early. He had improved single leg heel raise on shuttle this session and seems to be improving with plantarflexion mobility. Pt still unable to perform single leg heel raise on ground without relying on UE and compensation. Pt has mild to no antalgic gait and did not require cueing on treadmill. Pt was challenged with soft jumping exercises on trampoline and more explosive type training of posterior chain musculature. Pt had  difficulty with pre-jumping exercises from sit to stands due to quickness to fatigue and compensatory mechanisms to improve explosive gastroc strength. Pt continues lacking RLE strength and stability. Pt is appropriate to continue with therapy; however, we are dropping to one time/week per patient request and since he is improving and is highly compliant with exercises. PT is updating plan of care to ensure he continues with necessary care so he can return to prior level of function with minimal to no modifications and pain. Plan of care expiration is now 3/1/2021.     Tommy is progressing well towards his goals.   Pt prognosis is Good.     Pt will continue to benefit from skilled outpatient physical therapy to address the deficits listed in the problem list box on initial evaluation, provide pt/family education and to maximize pt's level of independence in the home and community environment.   Pt's spiritual, cultural and educational needs considered and pt agreeable to plan of care and goals.     Anticipated barriers to physical therapy: none    Goals:   Short Term GOALS: 6 weeks. Pt agrees with goals set.   1. Patient demonstrates independence with HEP. Goal met   2. Patient demonstrates independence with Postural Awareness. Goal met   3. Patient demonstrates improved R wrist motion by 10 degrees in all directions to improve gripping and manipulating objects. Goal met   4. Patient demonstrates increased L ankle ROM to be symmetrical with R ankle ROM in all directions to improve tolerance to functional activities pain free.  ongoing, nearly met   5. Patient demonstrates ability to walk 500 feet with no significant increase in pain to improve independence with household mobility. Goal met      Long Term GOALS: 12 weeks. Pt agrees with goals set. Ongoing, in progress   1. Patient demonstrates increased R wrist mobility to be symmetrical to L wrist mobility to improve tolerance to functional activities pain free. Goal  met   2. Patient demonstrates increased strength BLE's to 4+/5 or greater to improve tolerance to functional activities pain free.   3. Patient demonstrates improved overall function per FOTO Ankle Survey to 35% Limitation or less.   4. Patient demonstrates ability to walk 1 mile with no discomfort or gait abnormalities to improve independence with community mobility.   5. Patient will return to work with minimal to no modifications required to perform job independently.     Plan     Plan of care Certification: 9/21/2020 to 12/21/2020 updated to 3/1/2021     Closed chain exercises; ankle mobility and strength; proprioception exercises       Kortney Gaines, PT   12/28/2020

## 2020-12-30 ENCOUNTER — DOCUMENTATION ONLY (OUTPATIENT)
Dept: REHABILITATION | Facility: HOSPITAL | Age: 50
End: 2020-12-30

## 2021-01-04 ENCOUNTER — CLINICAL SUPPORT (OUTPATIENT)
Dept: REHABILITATION | Facility: HOSPITAL | Age: 51
End: 2021-01-04
Attending: ORTHOPAEDIC SURGERY
Payer: COMMERCIAL

## 2021-01-04 DIAGNOSIS — M25.571 ACUTE RIGHT ANKLE PAIN: ICD-10-CM

## 2021-01-04 DIAGNOSIS — R68.89 DECREASED STRENGTH, ENDURANCE, AND MOBILITY: ICD-10-CM

## 2021-01-04 DIAGNOSIS — R26.2 DIFFICULTY WALKING: ICD-10-CM

## 2021-01-04 DIAGNOSIS — Z74.09 DECREASED STRENGTH, ENDURANCE, AND MOBILITY: ICD-10-CM

## 2021-01-04 DIAGNOSIS — M25.671 DECREASED RANGE OF MOTION OF RIGHT ANKLE: ICD-10-CM

## 2021-01-04 DIAGNOSIS — R53.1 DECREASED STRENGTH, ENDURANCE, AND MOBILITY: ICD-10-CM

## 2021-01-04 PROCEDURE — 97110 THERAPEUTIC EXERCISES: CPT | Mod: PN

## 2021-01-04 PROCEDURE — 97140 MANUAL THERAPY 1/> REGIONS: CPT | Mod: PN

## 2021-01-04 PROCEDURE — 97112 NEUROMUSCULAR REEDUCATION: CPT | Mod: PN

## 2021-01-21 NOTE — PROGRESS NOTES
Physical Therapy Daily Treatment Note     Name: Tommy Lamar .  Clinic Number: [id number]    Therapy Diagnosis:   No diagnosis found.  Physician: Christy Workman MD    Visit Date: 12/10/2020    Physician Orders: PT Eval and Treat   Medical Diagnosis from Referral:   Closed bimalleolar fracture of right ankle with routine healing, subsequent encounter   M25.531 (ICD-10-CM) - Wrist pain, right   Evaluation Date: 9/21/2020    PN Due: 12/21/2020  Authorization Period Expiration: 12/31/2020  Plan of Care Expiration: 12/31/2020  Visit # / Visits authorized: 20/30 (+2)     Time In: 3:50p  Time Out: 4:30p  Total Billable Time: 40 minutes    Precautions: Standard and Weightbearing as tolerated; fall risk   S/P: closed bimalleolar fracture on 7/29/2020:   POW 21 by 12/9/2020  Subjective     Pt reports: he feels good, but still has some tightness.   He was compliant with home exercise program.  Response to previous treatment: good  Functional change: none     Pain: 1/10    Objective     Tommy received therapeutic exercises to develop strength, endurance, ROM, flexibility, posture and core stabilization for 28 minutes including:    Bold=Exercises Performed    Elliptical  x5 min, L4; Elevation 7   Treadmill x6 min 2.0mph     Gastroc stretch on wedge [id number]   Soleus stretch on wedge [id number]   Staggered heel raises with focus on pushing up through RLE x15  Heel raises with deficit 2x10  Soleus heel raises 2x15     Step ups on 6 in step x15   Step downs off 6 in step x15   Shuttle SL heel raises with deficit, black cord 2x12  Shuttle SL soleus raises with deficit, black cord x15  Shuttle SL squats 1.5 cords 2x12    Monster walks, RTB 2 laps   Side steps, RTB 2 laps   Sled push, 2 laps of 60ft     NP:  DL heel raise up, R SL down x10   Toe raises 2x15   DF self-mobilization x20, 2 sec hold   Passive PF stretch [id number]    Tommy participated in neuromuscular re-education activities to improve: Balance, Coordination, Sense,  Continue current management. Proprioception and Posture for 2 minutes. The following activities were included:    Single leg stance [id number]   Tandem stance on foam [id number] ea   Tandem stance to rebounder x30   Rockerboard - no UE x1 min ea     Tommy received the following manual therapy techniques: Soft tissue Mobilization were applied to the: R ankle/foot for 10 minutes, including:  STM to gastroc and achilles  STM with instrument to Scar and surrounding areas  TC joint mobs - gentle - AP grade 3-4 joint mobs   tibfib grade 3-4 joint mobs   MWM DF x30      Tommy participated in gait training to improve functional mobility and safety for 00 minutes, including:  Walking in // bars with no boot on -0 to 1 UE support     Home Exercises Provided and Patient Education Provided     Education provided:   - continue with exercises at home     Written Home Exercises Provided: Patient instructed to cont prior HEP.  Exercises were reviewed and Tommy was able to demonstrate them prior to the end of the session.  Tommy demonstrated good  understanding of the education provided.     See EMR under Patient Instructions for exercises provided 9/21/2020.    Assessment     Pt tolerated therapy session very well today. Ambulation has improved significantly since last seen in therapy - with decreased antalgic gait and improved step length bilaterally. He did not have significant pain today. Focused a lot of single limb strength. He continues with difficulty performing single leg heel raise on ground but is able to perform this on shuttle. He lacks plantarflexion, but dorsiflexion has improved to 8 degrees actively and 10 degrees with overpressure after manual treatment provided. He has more soleus tightness as compared to gastroc tightness. Pt has improved pain with functional mobility in clinic. Continue with POC and progress as able.     Tommy is progressing well towards his goals.   Pt prognosis is Good.     Pt will continue to benefit from skilled outpatient  physical therapy to address the deficits listed in the problem list box on initial evaluation, provide pt/family education and to maximize pt's level of independence in the home and community environment.   Pt's spiritual, cultural and educational needs considered and pt agreeable to plan of care and goals.     Anticipated barriers to physical therapy: none    Goals:   Short Term GOALS: 6 weeks. Pt agrees with goals set.   1. Patient demonstrates independence with HEP. Goal met   2. Patient demonstrates independence with Postural Awareness. Goal met   3. Patient demonstrates improved R wrist motion by 10 degrees in all directions to improve gripping and manipulating objects. Goal met   4. Patient demonstrates increased L ankle ROM to be symmetrical with R ankle ROM in all directions to improve tolerance to functional activities pain free.  ongoing, nearly met   5. Patient demonstrates ability to walk 500 feet with no significant increase in pain to improve independence with household mobility. Goal met      Long Term GOALS: 12 weeks. Pt agrees with goals set. Ongoing, in progress   1. Patient demonstrates increased R wrist mobility to be symmetrical to L wrist mobility to improve tolerance to functional activities pain free. Goal met   2. Patient demonstrates increased strength BLE's to 4+/5 or greater to improve tolerance to functional activities pain free.   3. Patient demonstrates improved overall function per FOTO Ankle Survey to 35% Limitation or less.   4. Patient demonstrates ability to walk 1 mile with no discomfort or gait abnormalities to improve independence with community mobility.   5. Patient will return to work with minimal to no modifications required to perform job independently.     Plan     Plan of care Certification: 9/21/2020 to 12/21/2020.    Closed chain exercises; ankle mobility and strength; proprioception exercises       Kortney Gaines, PT   12/09/2020

## 2021-02-01 ENCOUNTER — APPOINTMENT (OUTPATIENT)
Dept: RADIOLOGY | Facility: HOSPITAL | Age: 51
End: 2021-02-01
Attending: ORTHOPAEDIC SURGERY
Payer: COMMERCIAL

## 2021-02-01 ENCOUNTER — OFFICE VISIT (OUTPATIENT)
Dept: ORTHOPEDICS | Facility: CLINIC | Age: 51
End: 2021-02-01
Payer: COMMERCIAL

## 2021-02-01 VITALS
BODY MASS INDEX: 32.74 KG/M2 | OXYGEN SATURATION: 97 % | HEART RATE: 72 BPM | HEIGHT: 68 IN | SYSTOLIC BLOOD PRESSURE: 120 MMHG | DIASTOLIC BLOOD PRESSURE: 86 MMHG | WEIGHT: 216.06 LBS | RESPIRATION RATE: 18 BRPM

## 2021-02-01 DIAGNOSIS — S82.841D CLOSED BIMALLEOLAR FRACTURE OF RIGHT ANKLE WITH ROUTINE HEALING, SUBSEQUENT ENCOUNTER: ICD-10-CM

## 2021-02-01 DIAGNOSIS — S82.841D CLOSED BIMALLEOLAR FRACTURE OF RIGHT ANKLE WITH ROUTINE HEALING, SUBSEQUENT ENCOUNTER: Primary | ICD-10-CM

## 2021-02-01 PROCEDURE — 73610 X-RAY EXAM OF ANKLE: CPT | Mod: TC,FY,PN,RT

## 2021-02-01 PROCEDURE — 99999 PR PBB SHADOW E&M-EST. PATIENT-LVL III: ICD-10-PCS | Mod: PBBFAC,,, | Performed by: ORTHOPAEDIC SURGERY

## 2021-02-01 PROCEDURE — 99213 PR OFFICE/OUTPT VISIT, EST, LEVL III, 20-29 MIN: ICD-10-PCS | Mod: S$GLB,,, | Performed by: ORTHOPAEDIC SURGERY

## 2021-02-01 PROCEDURE — 3074F PR MOST RECENT SYSTOLIC BLOOD PRESSURE < 130 MM HG: ICD-10-PCS | Mod: CPTII,S$GLB,, | Performed by: ORTHOPAEDIC SURGERY

## 2021-02-01 PROCEDURE — 73610 XR ANKLE COMPLETE 3 VIEW RIGHT: ICD-10-PCS | Mod: 26,RT,, | Performed by: RADIOLOGY

## 2021-02-01 PROCEDURE — 3079F DIAST BP 80-89 MM HG: CPT | Mod: CPTII,S$GLB,, | Performed by: ORTHOPAEDIC SURGERY

## 2021-02-01 PROCEDURE — 99213 OFFICE O/P EST LOW 20 MIN: CPT | Mod: S$GLB,,, | Performed by: ORTHOPAEDIC SURGERY

## 2021-02-01 PROCEDURE — 3008F PR BODY MASS INDEX (BMI) DOCUMENTED: ICD-10-PCS | Mod: CPTII,S$GLB,, | Performed by: ORTHOPAEDIC SURGERY

## 2021-02-01 PROCEDURE — 73610 X-RAY EXAM OF ANKLE: CPT | Mod: 26,RT,, | Performed by: RADIOLOGY

## 2021-02-01 PROCEDURE — 3008F BODY MASS INDEX DOCD: CPT | Mod: CPTII,S$GLB,, | Performed by: ORTHOPAEDIC SURGERY

## 2021-02-01 PROCEDURE — 1126F PR PAIN SEVERITY QUANTIFIED, NO PAIN PRESENT: ICD-10-PCS | Mod: S$GLB,,, | Performed by: ORTHOPAEDIC SURGERY

## 2021-02-01 PROCEDURE — 1126F AMNT PAIN NOTED NONE PRSNT: CPT | Mod: S$GLB,,, | Performed by: ORTHOPAEDIC SURGERY

## 2021-02-01 PROCEDURE — 3079F PR MOST RECENT DIASTOLIC BLOOD PRESSURE 80-89 MM HG: ICD-10-PCS | Mod: CPTII,S$GLB,, | Performed by: ORTHOPAEDIC SURGERY

## 2021-02-01 PROCEDURE — 99999 PR PBB SHADOW E&M-EST. PATIENT-LVL III: CPT | Mod: PBBFAC,,, | Performed by: ORTHOPAEDIC SURGERY

## 2021-02-01 PROCEDURE — 3074F SYST BP LT 130 MM HG: CPT | Mod: CPTII,S$GLB,, | Performed by: ORTHOPAEDIC SURGERY

## 2021-04-29 ENCOUNTER — HOSPITAL ENCOUNTER (EMERGENCY)
Facility: HOSPITAL | Age: 51
Discharge: HOME | End: 2021-04-29
Attending: EMERGENCY MEDICINE
Payer: COMMERCIAL

## 2021-04-29 VITALS
DIASTOLIC BLOOD PRESSURE: 86 MMHG | BODY MASS INDEX: 31.83 KG/M2 | WEIGHT: 210 LBS | SYSTOLIC BLOOD PRESSURE: 127 MMHG | HEIGHT: 68 IN | HEART RATE: 89 BPM | OXYGEN SATURATION: 97 % | RESPIRATION RATE: 16 BRPM | TEMPERATURE: 98 F

## 2021-04-29 DIAGNOSIS — R00.0 TACHYCARDIA: Primary | ICD-10-CM

## 2021-04-29 PROCEDURE — 93005 ELECTROCARDIOGRAM TRACING: CPT

## 2021-04-29 PROCEDURE — 99283 EMERGENCY DEPT VISIT LOW MDM: CPT | Mod: 25

## 2021-04-29 PROCEDURE — 93010 EKG 12-LEAD: ICD-10-PCS | Mod: ,,, | Performed by: INTERNAL MEDICINE

## 2021-04-29 PROCEDURE — 93010 ELECTROCARDIOGRAM REPORT: CPT | Mod: ,,, | Performed by: INTERNAL MEDICINE

## 2021-06-29 DIAGNOSIS — M10.072 ACUTE IDIOPATHIC GOUT INVOLVING TOE OF LEFT FOOT: ICD-10-CM

## 2021-06-29 RX ORDER — OMEPRAZOLE 40 MG/1
CAPSULE, DELAYED RELEASE ORAL
Qty: 90 CAPSULE | Refills: 3 | Status: SHIPPED | OUTPATIENT
Start: 2021-06-29

## 2021-06-29 RX ORDER — COLCHICINE 0.6 MG/1
TABLET ORAL
Qty: 30 TABLET | Refills: 0 | Status: SHIPPED | OUTPATIENT
Start: 2021-06-29 | End: 2021-10-29 | Stop reason: SDUPTHER

## 2021-07-21 DIAGNOSIS — M25.571 RIGHT ANKLE PAIN, UNSPECIFIED CHRONICITY: Primary | ICD-10-CM

## 2021-07-22 ENCOUNTER — OFFICE VISIT (OUTPATIENT)
Dept: ORTHOPEDICS | Facility: CLINIC | Age: 51
End: 2021-07-22
Payer: COMMERCIAL

## 2021-07-22 VITALS
RESPIRATION RATE: 18 BRPM | SYSTOLIC BLOOD PRESSURE: 120 MMHG | HEIGHT: 68 IN | WEIGHT: 213.63 LBS | DIASTOLIC BLOOD PRESSURE: 60 MMHG | OXYGEN SATURATION: 97 % | BODY MASS INDEX: 32.38 KG/M2 | HEART RATE: 69 BPM

## 2021-07-22 DIAGNOSIS — M25.571 RIGHT ANKLE PAIN, UNSPECIFIED CHRONICITY: Primary | ICD-10-CM

## 2021-07-22 PROCEDURE — 3008F PR BODY MASS INDEX (BMI) DOCUMENTED: ICD-10-PCS | Mod: CPTII,S$GLB,, | Performed by: ORTHOPAEDIC SURGERY

## 2021-07-22 PROCEDURE — 1126F PR PAIN SEVERITY QUANTIFIED, NO PAIN PRESENT: ICD-10-PCS | Mod: CPTII,S$GLB,, | Performed by: ORTHOPAEDIC SURGERY

## 2021-07-22 PROCEDURE — 3008F BODY MASS INDEX DOCD: CPT | Mod: CPTII,S$GLB,, | Performed by: ORTHOPAEDIC SURGERY

## 2021-07-22 PROCEDURE — 99213 PR OFFICE/OUTPT VISIT, EST, LEVL III, 20-29 MIN: ICD-10-PCS | Mod: S$GLB,,, | Performed by: ORTHOPAEDIC SURGERY

## 2021-07-22 PROCEDURE — 99213 OFFICE O/P EST LOW 20 MIN: CPT | Mod: S$GLB,,, | Performed by: ORTHOPAEDIC SURGERY

## 2021-07-22 PROCEDURE — 99999 PR PBB SHADOW E&M-EST. PATIENT-LVL III: ICD-10-PCS | Mod: PBBFAC,,, | Performed by: ORTHOPAEDIC SURGERY

## 2021-07-22 PROCEDURE — 99999 PR PBB SHADOW E&M-EST. PATIENT-LVL III: CPT | Mod: PBBFAC,,, | Performed by: ORTHOPAEDIC SURGERY

## 2021-07-22 PROCEDURE — 1126F AMNT PAIN NOTED NONE PRSNT: CPT | Mod: CPTII,S$GLB,, | Performed by: ORTHOPAEDIC SURGERY

## 2021-08-27 ENCOUNTER — TELEPHONE (OUTPATIENT)
Dept: FAMILY MEDICINE | Facility: CLINIC | Age: 51
End: 2021-08-27

## 2021-08-27 DIAGNOSIS — M10.362 ACUTE GOUT DUE TO RENAL IMPAIRMENT INVOLVING LEFT KNEE: Primary | ICD-10-CM

## 2021-08-27 RX ORDER — PREDNISONE 20 MG/1
20 TABLET ORAL 2 TIMES DAILY
Qty: 14 TABLET | Refills: 0 | Status: SHIPPED | OUTPATIENT
Start: 2021-08-27 | End: 2021-09-03

## 2021-10-05 DIAGNOSIS — I47.10 SUPRAVENTRICULAR TACHYCARDIA: ICD-10-CM

## 2021-10-06 RX ORDER — METOPROLOL TARTRATE 25 MG/1
TABLET, FILM COATED ORAL
Qty: 45 TABLET | Refills: 0 | OUTPATIENT
Start: 2021-10-06

## 2021-10-27 ENCOUNTER — HOSPITAL ENCOUNTER (EMERGENCY)
Facility: HOSPITAL | Age: 51
Discharge: HOME | End: 2021-10-27
Attending: EMERGENCY MEDICINE
Payer: COMMERCIAL

## 2021-10-27 VITALS
BODY MASS INDEX: 31.83 KG/M2 | DIASTOLIC BLOOD PRESSURE: 84 MMHG | HEIGHT: 68 IN | SYSTOLIC BLOOD PRESSURE: 145 MMHG | OXYGEN SATURATION: 98 % | RESPIRATION RATE: 18 BRPM | TEMPERATURE: 99 F | WEIGHT: 210 LBS | HEART RATE: 84 BPM

## 2021-10-27 DIAGNOSIS — N20.0 NEPHROLITHIASIS: ICD-10-CM

## 2021-10-27 DIAGNOSIS — R10.32 LLQ PAIN: Primary | ICD-10-CM

## 2021-10-27 LAB
ALBUMIN SERPL BCP-MCNC: 4.2 G/DL (ref 3.5–5.2)
ALP SERPL-CCNC: 93 U/L (ref 55–135)
ALT SERPL W/O P-5'-P-CCNC: 27 U/L (ref 10–44)
ANION GAP SERPL CALC-SCNC: 8 MMOL/L (ref 8–16)
AST SERPL-CCNC: 22 U/L (ref 10–40)
BASOPHILS # BLD AUTO: 0.05 K/UL (ref 0–0.2)
BASOPHILS NFR BLD: 0.9 % (ref 0–1.9)
BILIRUB SERPL-MCNC: 0.4 MG/DL (ref 0.1–1)
BILIRUB UR QL STRIP: NEGATIVE
BUN SERPL-MCNC: 13 MG/DL (ref 6–20)
CALCIUM SERPL-MCNC: 10.1 MG/DL (ref 8.7–10.5)
CHLORIDE SERPL-SCNC: 106 MMOL/L (ref 95–110)
CLARITY UR: CLEAR
CO2 SERPL-SCNC: 27 MMOL/L (ref 23–29)
COLOR UR: YELLOW
CREAT SERPL-MCNC: 1.4 MG/DL (ref 0.5–1.4)
DIFFERENTIAL METHOD: ABNORMAL
EOSINOPHIL # BLD AUTO: 0.2 K/UL (ref 0–0.5)
EOSINOPHIL NFR BLD: 4.1 % (ref 0–8)
ERYTHROCYTE [DISTWIDTH] IN BLOOD BY AUTOMATED COUNT: 12.2 % (ref 11.5–14.5)
EST. GFR  (AFRICAN AMERICAN): >60 ML/MIN/1.73 M^2
EST. GFR  (NON AFRICAN AMERICAN): 57 ML/MIN/1.73 M^2
GLUCOSE SERPL-MCNC: 97 MG/DL (ref 70–110)
GLUCOSE UR QL STRIP: NEGATIVE
HCT VFR BLD AUTO: 40.8 % (ref 40–54)
HGB BLD-MCNC: 14.1 G/DL (ref 14–18)
HGB UR QL STRIP: NEGATIVE
IMM GRANULOCYTES # BLD AUTO: 0.03 K/UL (ref 0–0.04)
IMM GRANULOCYTES NFR BLD AUTO: 0.5 % (ref 0–0.5)
KETONES UR QL STRIP: NEGATIVE
LEUKOCYTE ESTERASE UR QL STRIP: NEGATIVE
LYMPHOCYTES # BLD AUTO: 1.5 K/UL (ref 1–4.8)
LYMPHOCYTES NFR BLD: 26.2 % (ref 18–48)
MCH RBC QN AUTO: 30.2 PG (ref 27–31)
MCHC RBC AUTO-ENTMCNC: 34.6 G/DL (ref 32–36)
MCV RBC AUTO: 87 FL (ref 82–98)
MONOCYTES # BLD AUTO: 0.6 K/UL (ref 0.3–1)
MONOCYTES NFR BLD: 10.3 % (ref 4–15)
NEUTROPHILS # BLD AUTO: 3.4 K/UL (ref 1.8–7.7)
NEUTROPHILS NFR BLD: 58 % (ref 38–73)
NITRITE UR QL STRIP: NEGATIVE
NRBC BLD-RTO: 0 /100 WBC
PH UR STRIP: 6 [PH] (ref 5–8)
PLATELET # BLD AUTO: 186 K/UL (ref 150–450)
PMV BLD AUTO: 9 FL (ref 9.2–12.9)
POTASSIUM SERPL-SCNC: 4.1 MMOL/L (ref 3.5–5.1)
PROT SERPL-MCNC: 8.1 G/DL (ref 6–8.4)
PROT UR QL STRIP: NEGATIVE
RBC # BLD AUTO: 4.67 M/UL (ref 4.6–6.2)
SODIUM SERPL-SCNC: 141 MMOL/L (ref 136–145)
SP GR UR STRIP: 1.01 (ref 1–1.03)
URN SPEC COLLECT METH UR: NORMAL
UROBILINOGEN UR STRIP-ACNC: NEGATIVE EU/DL
WBC # BLD AUTO: 5.81 K/UL (ref 3.9–12.7)

## 2021-10-27 PROCEDURE — 25000003 PHARM REV CODE 250: Performed by: PHYSICIAN ASSISTANT

## 2021-10-27 PROCEDURE — 96374 THER/PROPH/DIAG INJ IV PUSH: CPT

## 2021-10-27 PROCEDURE — 63600175 PHARM REV CODE 636 W HCPCS: Performed by: PHYSICIAN ASSISTANT

## 2021-10-27 PROCEDURE — 81003 URINALYSIS AUTO W/O SCOPE: CPT | Performed by: EMERGENCY MEDICINE

## 2021-10-27 PROCEDURE — 80053 COMPREHEN METABOLIC PANEL: CPT | Performed by: PHYSICIAN ASSISTANT

## 2021-10-27 PROCEDURE — 99285 EMERGENCY DEPT VISIT HI MDM: CPT | Mod: 25

## 2021-10-27 PROCEDURE — 85025 COMPLETE CBC W/AUTO DIFF WBC: CPT | Performed by: PHYSICIAN ASSISTANT

## 2021-10-27 PROCEDURE — 96375 TX/PRO/DX INJ NEW DRUG ADDON: CPT

## 2021-10-27 RX ORDER — MORPHINE SULFATE 4 MG/ML
4 INJECTION, SOLUTION INTRAMUSCULAR; INTRAVENOUS
Status: COMPLETED | OUTPATIENT
Start: 2021-10-27 | End: 2021-10-27

## 2021-10-27 RX ORDER — IBUPROFEN 600 MG/1
600 TABLET ORAL EVERY 6 HOURS PRN
Qty: 20 TABLET | Refills: 0 | Status: SHIPPED | OUTPATIENT
Start: 2021-10-27 | End: 2021-10-29 | Stop reason: SDUPTHER

## 2021-10-27 RX ORDER — ONDANSETRON 2 MG/ML
4 INJECTION INTRAMUSCULAR; INTRAVENOUS
Status: COMPLETED | OUTPATIENT
Start: 2021-10-27 | End: 2021-10-27

## 2021-10-27 RX ORDER — KETOROLAC TROMETHAMINE 30 MG/ML
15 INJECTION, SOLUTION INTRAMUSCULAR; INTRAVENOUS
Status: COMPLETED | OUTPATIENT
Start: 2021-10-27 | End: 2021-10-27

## 2021-10-27 RX ORDER — TAMSULOSIN HYDROCHLORIDE 0.4 MG/1
0.4 CAPSULE ORAL
Status: COMPLETED | OUTPATIENT
Start: 2021-10-27 | End: 2021-10-27

## 2021-10-27 RX ORDER — LIDOCAINE 50 MG/G
1 PATCH TOPICAL DAILY
Qty: 15 PATCH | Refills: 0 | Status: SHIPPED | OUTPATIENT
Start: 2021-10-27 | End: 2021-11-11

## 2021-10-27 RX ADMIN — MORPHINE SULFATE 4 MG: 4 INJECTION INTRAVENOUS at 12:10

## 2021-10-27 RX ADMIN — KETOROLAC TROMETHAMINE 15 MG: 30 INJECTION, SOLUTION INTRAMUSCULAR at 12:10

## 2021-10-27 RX ADMIN — SODIUM CHLORIDE 1000 ML: 0.9 INJECTION, SOLUTION INTRAVENOUS at 11:10

## 2021-10-27 RX ADMIN — ONDANSETRON 4 MG: 2 INJECTION INTRAMUSCULAR; INTRAVENOUS at 12:10

## 2021-10-27 RX ADMIN — TAMSULOSIN HYDROCHLORIDE 0.4 MG: 0.4 CAPSULE ORAL at 12:10

## 2021-10-29 ENCOUNTER — OFFICE VISIT (OUTPATIENT)
Dept: FAMILY MEDICINE | Facility: CLINIC | Age: 51
End: 2021-10-29
Payer: COMMERCIAL

## 2021-10-29 VITALS
DIASTOLIC BLOOD PRESSURE: 80 MMHG | HEART RATE: 79 BPM | WEIGHT: 220.69 LBS | HEIGHT: 68 IN | SYSTOLIC BLOOD PRESSURE: 130 MMHG | RESPIRATION RATE: 16 BRPM | TEMPERATURE: 98 F | OXYGEN SATURATION: 96 % | BODY MASS INDEX: 33.45 KG/M2

## 2021-10-29 DIAGNOSIS — I47.10 SUPRAVENTRICULAR TACHYCARDIA: ICD-10-CM

## 2021-10-29 DIAGNOSIS — M25.852 LEFT HIP IMPINGEMENT SYNDROME: Primary | ICD-10-CM

## 2021-10-29 DIAGNOSIS — M10.072 ACUTE IDIOPATHIC GOUT INVOLVING TOE OF LEFT FOOT: ICD-10-CM

## 2021-10-29 PROCEDURE — 3008F BODY MASS INDEX DOCD: CPT | Mod: CPTII,S$GLB,, | Performed by: NURSE PRACTITIONER

## 2021-10-29 PROCEDURE — 1159F MED LIST DOCD IN RCRD: CPT | Mod: CPTII,S$GLB,, | Performed by: NURSE PRACTITIONER

## 2021-10-29 PROCEDURE — 99999 PR PBB SHADOW E&M-EST. PATIENT-LVL IV: ICD-10-PCS | Mod: PBBFAC,,, | Performed by: NURSE PRACTITIONER

## 2021-10-29 PROCEDURE — 1160F PR REVIEW ALL MEDS BY PRESCRIBER/CLIN PHARMACIST DOCUMENTED: ICD-10-PCS | Mod: CPTII,S$GLB,, | Performed by: NURSE PRACTITIONER

## 2021-10-29 PROCEDURE — 3075F PR MOST RECENT SYSTOLIC BLOOD PRESS GE 130-139MM HG: ICD-10-PCS | Mod: CPTII,S$GLB,, | Performed by: NURSE PRACTITIONER

## 2021-10-29 PROCEDURE — 99999 PR PBB SHADOW E&M-EST. PATIENT-LVL IV: CPT | Mod: PBBFAC,,, | Performed by: NURSE PRACTITIONER

## 2021-10-29 PROCEDURE — 99214 PR OFFICE/OUTPT VISIT, EST, LEVL IV, 30-39 MIN: ICD-10-PCS | Mod: S$GLB,,, | Performed by: NURSE PRACTITIONER

## 2021-10-29 PROCEDURE — 3079F PR MOST RECENT DIASTOLIC BLOOD PRESSURE 80-89 MM HG: ICD-10-PCS | Mod: CPTII,S$GLB,, | Performed by: NURSE PRACTITIONER

## 2021-10-29 PROCEDURE — 99214 OFFICE O/P EST MOD 30 MIN: CPT | Mod: S$GLB,,, | Performed by: NURSE PRACTITIONER

## 2021-10-29 PROCEDURE — 3075F SYST BP GE 130 - 139MM HG: CPT | Mod: CPTII,S$GLB,, | Performed by: NURSE PRACTITIONER

## 2021-10-29 PROCEDURE — 1160F RVW MEDS BY RX/DR IN RCRD: CPT | Mod: CPTII,S$GLB,, | Performed by: NURSE PRACTITIONER

## 2021-10-29 PROCEDURE — 1159F PR MEDICATION LIST DOCUMENTED IN MEDICAL RECORD: ICD-10-PCS | Mod: CPTII,S$GLB,, | Performed by: NURSE PRACTITIONER

## 2021-10-29 PROCEDURE — 3079F DIAST BP 80-89 MM HG: CPT | Mod: CPTII,S$GLB,, | Performed by: NURSE PRACTITIONER

## 2021-10-29 PROCEDURE — 3008F PR BODY MASS INDEX (BMI) DOCUMENTED: ICD-10-PCS | Mod: CPTII,S$GLB,, | Performed by: NURSE PRACTITIONER

## 2021-10-29 RX ORDER — IBUPROFEN 600 MG/1
600 TABLET ORAL EVERY 6 HOURS PRN
Qty: 40 TABLET | Refills: 1 | Status: SHIPPED | OUTPATIENT
Start: 2021-10-29

## 2021-10-29 RX ORDER — METHOCARBAMOL 750 MG/1
750 TABLET, FILM COATED ORAL 4 TIMES DAILY
Qty: 40 TABLET | Refills: 1 | Status: SHIPPED | OUTPATIENT
Start: 2021-10-29 | End: 2021-11-08

## 2021-10-29 RX ORDER — METOPROLOL TARTRATE 25 MG/1
12.5 TABLET, FILM COATED ORAL NIGHTLY
Qty: 45 TABLET | Refills: 3 | Status: SHIPPED | OUTPATIENT
Start: 2021-10-29 | End: 2022-11-22

## 2021-10-29 RX ORDER — COLCHICINE 0.6 MG/1
TABLET ORAL
Qty: 30 TABLET | Refills: 0 | Status: SHIPPED | OUTPATIENT
Start: 2021-10-29 | End: 2021-11-23

## 2021-11-02 ENCOUNTER — PATIENT OUTREACH (OUTPATIENT)
Dept: ADMINISTRATIVE | Facility: HOSPITAL | Age: 51
End: 2021-11-02
Payer: COMMERCIAL

## 2021-11-23 DIAGNOSIS — M10.072 ACUTE IDIOPATHIC GOUT INVOLVING TOE OF LEFT FOOT: ICD-10-CM

## 2021-11-23 RX ORDER — COLCHICINE 0.6 MG/1
TABLET ORAL
Qty: 30 TABLET | Refills: 0 | Status: SHIPPED | OUTPATIENT
Start: 2021-11-23 | End: 2022-01-21

## 2021-12-15 ENCOUNTER — PATIENT OUTREACH (OUTPATIENT)
Dept: ADMINISTRATIVE | Facility: HOSPITAL | Age: 51
End: 2021-12-15
Payer: COMMERCIAL

## 2021-12-21 ENCOUNTER — TELEPHONE (OUTPATIENT)
Dept: FAMILY MEDICINE | Facility: CLINIC | Age: 51
End: 2021-12-21
Payer: COMMERCIAL

## 2021-12-21 ENCOUNTER — TELEPHONE (OUTPATIENT)
Dept: EMERGENCY MEDICINE | Facility: HOSPITAL | Age: 51
End: 2021-12-21
Payer: COMMERCIAL

## 2021-12-27 ENCOUNTER — TELEPHONE (OUTPATIENT)
Dept: FAMILY MEDICINE | Facility: CLINIC | Age: 51
End: 2021-12-27
Payer: COMMERCIAL

## 2021-12-27 NOTE — TELEPHONE ENCOUNTER
----- Message from Terra Hui MA sent at 12/27/2021  9:52 AM CST -----  Type: Patient Call Back    Who called:self    What is the request in detail:diagnosed with covid on yesterday and would like to know if his appt. On tomorrow can be a audio or virtual visit ..     Can the clinic reply by [facility]?no    Would the patient rather a call back or a response via [facility]? yes    Best call back number:[phone] (home)

## 2021-12-28 ENCOUNTER — OFFICE VISIT (OUTPATIENT)
Dept: FAMILY MEDICINE | Facility: CLINIC | Age: 51
End: 2021-12-28
Payer: COMMERCIAL

## 2021-12-28 DIAGNOSIS — N18.31 CHRONIC KIDNEY DISEASE, STAGE 3A: ICD-10-CM

## 2021-12-28 DIAGNOSIS — U07.1 COVID-19 VIRUS INFECTION: Primary | ICD-10-CM

## 2021-12-28 PROCEDURE — 99214 OFFICE O/P EST MOD 30 MIN: CPT | Mod: 95,,, | Performed by: FAMILY MEDICINE

## 2021-12-28 PROCEDURE — 99214 PR OFFICE/OUTPT VISIT, EST, LEVL IV, 30-39 MIN: ICD-10-PCS | Mod: 95,,, | Performed by: FAMILY MEDICINE

## 2021-12-28 RX ORDER — AZITHROMYCIN 250 MG/1
TABLET, FILM COATED ORAL
Qty: 6 TABLET | Refills: 0 | Status: SHIPPED | OUTPATIENT
Start: 2021-12-28 | End: 2022-01-02

## 2022-01-21 DIAGNOSIS — M10.072 ACUTE IDIOPATHIC GOUT INVOLVING TOE OF LEFT FOOT: ICD-10-CM

## 2022-01-21 RX ORDER — COLCHICINE 0.6 MG/1
TABLET ORAL
Qty: 30 TABLET | Refills: 0 | Status: SHIPPED | OUTPATIENT
Start: 2022-01-21 | End: 2022-03-08

## 2022-01-21 NOTE — TELEPHONE ENCOUNTER
Care Due:                  Date            Visit Type   Department     Provider  --------------------------------------------------------------------------------                                             [facility]                                           MEDICINE/INTERN  Last Visit: 12-      None         AL MED         [place]  Next Visit: None Scheduled  None         None Found                                                            Last  Test          Frequency    Reason                     Performed    Due Date  --------------------------------------------------------------------------------    Uric Acid...  12 months..  colchicine...............  Not Found    Overdue    Powered by [employer]. Reference number: [id number].   1/21/2022 2:57:55 PM CST

## 2022-01-26 ENCOUNTER — OFFICE VISIT (OUTPATIENT)
Dept: FAMILY MEDICINE | Facility: CLINIC | Age: 52
End: 2022-01-26
Payer: COMMERCIAL

## 2022-01-26 VITALS
DIASTOLIC BLOOD PRESSURE: 80 MMHG | SYSTOLIC BLOOD PRESSURE: 120 MMHG | WEIGHT: 216.25 LBS | OXYGEN SATURATION: 97 % | TEMPERATURE: 98 F | RESPIRATION RATE: 16 BRPM | HEIGHT: 68 IN | BODY MASS INDEX: 32.77 KG/M2 | HEART RATE: 73 BPM

## 2022-01-26 DIAGNOSIS — M1A.09X0 CHRONIC GOUT OF MULTIPLE SITES, UNSPECIFIED CAUSE: Primary | ICD-10-CM

## 2022-01-26 PROCEDURE — 1159F PR MEDICATION LIST DOCUMENTED IN MEDICAL RECORD: ICD-10-PCS | Mod: CPTII,S$GLB,, | Performed by: NURSE PRACTITIONER

## 2022-01-26 PROCEDURE — 3074F PR MOST RECENT SYSTOLIC BLOOD PRESSURE < 130 MM HG: ICD-10-PCS | Mod: CPTII,S$GLB,, | Performed by: NURSE PRACTITIONER

## 2022-01-26 PROCEDURE — 99213 PR OFFICE/OUTPT VISIT, EST, LEVL III, 20-29 MIN: ICD-10-PCS | Mod: S$GLB,,, | Performed by: NURSE PRACTITIONER

## 2022-01-26 PROCEDURE — 99999 PR PBB SHADOW E&M-EST. PATIENT-LVL IV: CPT | Mod: PBBFAC,,, | Performed by: NURSE PRACTITIONER

## 2022-01-26 PROCEDURE — 3079F DIAST BP 80-89 MM HG: CPT | Mod: CPTII,S$GLB,, | Performed by: NURSE PRACTITIONER

## 2022-01-26 PROCEDURE — 1159F MED LIST DOCD IN RCRD: CPT | Mod: CPTII,S$GLB,, | Performed by: NURSE PRACTITIONER

## 2022-01-26 PROCEDURE — 99999 PR PBB SHADOW E&M-EST. PATIENT-LVL IV: ICD-10-PCS | Mod: PBBFAC,,, | Performed by: NURSE PRACTITIONER

## 2022-01-26 PROCEDURE — 3008F PR BODY MASS INDEX (BMI) DOCUMENTED: ICD-10-PCS | Mod: CPTII,S$GLB,, | Performed by: NURSE PRACTITIONER

## 2022-01-26 PROCEDURE — 1160F RVW MEDS BY RX/DR IN RCRD: CPT | Mod: CPTII,S$GLB,, | Performed by: NURSE PRACTITIONER

## 2022-01-26 PROCEDURE — 3074F SYST BP LT 130 MM HG: CPT | Mod: CPTII,S$GLB,, | Performed by: NURSE PRACTITIONER

## 2022-01-26 PROCEDURE — 99213 OFFICE O/P EST LOW 20 MIN: CPT | Mod: S$GLB,,, | Performed by: NURSE PRACTITIONER

## 2022-01-26 PROCEDURE — 3079F PR MOST RECENT DIASTOLIC BLOOD PRESSURE 80-89 MM HG: ICD-10-PCS | Mod: CPTII,S$GLB,, | Performed by: NURSE PRACTITIONER

## 2022-01-26 PROCEDURE — 1160F PR REVIEW ALL MEDS BY PRESCRIBER/CLIN PHARMACIST DOCUMENTED: ICD-10-PCS | Mod: CPTII,S$GLB,, | Performed by: NURSE PRACTITIONER

## 2022-01-26 PROCEDURE — 3008F BODY MASS INDEX DOCD: CPT | Mod: CPTII,S$GLB,, | Performed by: NURSE PRACTITIONER

## 2022-01-26 RX ORDER — ALLOPURINOL 100 MG/1
100 TABLET ORAL DAILY
Qty: 90 TABLET | Refills: 3 | Status: SHIPPED | OUTPATIENT
Start: 2022-01-26

## 2022-01-26 NOTE — PROGRESS NOTES
"Subjective:      Patient ID: Tommy Lamar Sr. is a 52 y.o. male.  Pt presents post gout attack. States he ate a bushel of crab then began with left foot pain a day later. Went to ER and had steroid shot the next day. Today reports marked improvement of pain.     Review of Systems   Constitutional: Negative for activity change, appetite change, fever and unexpected weight change.   HENT: Negative for nasal congestion, ear pain, postnasal drip, rhinorrhea, sneezing, sore throat and voice change.    Eyes: Negative for pain and visual disturbance.   Respiratory: Negative for cough, chest tightness and shortness of breath.    Cardiovascular: Negative for chest pain, palpitations and leg swelling.   Gastrointestinal: Negative for abdominal pain, constipation, diarrhea, nausea and vomiting.   Endocrine: Negative.    Genitourinary: Negative for difficulty urinating.   Musculoskeletal: Positive for joint swelling. Negative for arthralgias, gait problem and myalgias.   Integumentary:  Negative for rash.   Allergic/Immunologic: Negative.    Neurological: Negative for speech difficulty and headaches.   Hematological: Negative.    Psychiatric/Behavioral: Negative.    All other systems reviewed and are negative.        Objective:     Vitals:    01/26/22 1530   BP: 120/80   Pulse: 73   Resp: 16   Temp: 98.3 °F (36.8 °C)   TempSrc: Oral   SpO2: 97%   Weight: 98.1 kg (216 lb 4.3 oz)   Height: 5' 8" (1.727 m)     Physical Exam  Vitals and nursing note reviewed.   Constitutional:       General: He is not in acute distress.     Appearance: Normal appearance. He is well-developed, well-groomed and overweight. He is not ill-appearing.   HENT:      Head: Normocephalic and atraumatic.      Right Ear: External ear normal.      Left Ear: External ear normal.      Nose: Nose normal.      Mouth/Throat:      Lips: Pink.      Mouth: Mucous membranes are moist.   Eyes:      General: Lids are normal. Vision grossly intact. Gaze aligned " From: Bravo Osborne  To: Kathy Griffiths MD  Sent: 9/11/2019 12:56 PM EDT  Subject: Prescription Question    Hello! I need a refill on Adderall and Diclofenec, please! appropriately. No scleral icterus.        Right eye: No discharge.         Left eye: No discharge.      Conjunctiva/sclera: Conjunctivae normal.   Neck:      Trachea: Phonation normal.   Pulmonary:      Effort: Pulmonary effort is normal. No accessory muscle usage or respiratory distress.   Musculoskeletal:      Cervical back: Neck supple.   Feet:      Right foot:      Skin integrity: Skin integrity normal.      Toenail Condition: Right toenails are normal.      Left foot:      Skin integrity: Skin integrity normal.      Toenail Condition: Left toenails are normal.   Skin:     General: Skin is warm and dry.      Findings: No rash.   Neurological:      General: No focal deficit present.      Mental Status: He is alert and oriented to person, place, and time. Mental status is at baseline.      Motor: No abnormal muscle tone.      Gait: Gait normal.   Psychiatric:         Attention and Perception: Attention and perception normal.         Mood and Affect: Mood and affect normal.         Speech: Speech normal.         Behavior: Behavior normal. Behavior is cooperative.         Thought Content: Thought content normal.         Cognition and Memory: Cognition and memory normal.         Judgment: Judgment normal.       Assessment and Plan:     1. Chronic gout of multiple sites, unspecified cause  Education about gout causes and treatment discussed.  begin allopurinol prophylasix and continue colchicine as needed for breakthrough   - allopurinoL (ZYLOPRIM) 100 MG tablet; Take 1 tablet (100 mg total) by mouth once daily.  Dispense: 90 tablet; Refill: 3           BETH Ortega, FNP-C  Family/Internal Medicine  [facility]

## 2022-03-08 DIAGNOSIS — M10.072 ACUTE IDIOPATHIC GOUT INVOLVING TOE OF LEFT FOOT: ICD-10-CM

## 2022-03-08 RX ORDER — COLCHICINE 0.6 MG/1
TABLET ORAL
Qty: 30 TABLET | Refills: 0 | Status: SHIPPED | OUTPATIENT
Start: 2022-03-08 | End: 2022-04-13

## 2022-03-08 NOTE — TELEPHONE ENCOUNTER
No new care gaps identified.  Powered by [employer]. Reference number: [id number].   3/08/2022 1:28:17 PM CST

## 2022-04-12 DIAGNOSIS — M10.072 ACUTE IDIOPATHIC GOUT INVOLVING TOE OF LEFT FOOT: ICD-10-CM

## 2022-04-12 NOTE — TELEPHONE ENCOUNTER
Care Due:                  Date            Visit Type   Department     Provider  --------------------------------------------------------------------------------                                             [facility]      MEDICINE/INTERN  Last Visit: 12-      AUDIO ONLY   [facility]         Negro Bell  Next Visit: None Scheduled  None         None Found                                                            Last  Test          Frequency    Reason                     Performed    Due Date  --------------------------------------------------------------------------------    Uric Acid...  12 months..  colchicine...............  Not Found    Overdue    Powered by [employer]. Reference number: [id number].   4/12/2022 5:44:49 PM CDT

## 2022-04-13 RX ORDER — COLCHICINE 0.6 MG/1
TABLET ORAL
Qty: 30 TABLET | Refills: 2 | Status: SHIPPED | OUTPATIENT
Start: 2022-04-13 | End: 2022-07-18

## 2022-04-13 NOTE — TELEPHONE ENCOUNTER
Refill Routing Note   Medication(s) are not appropriate for processing by [facility] for the following reason(s):      - Required laboratory values are outdated  - Required laboratory values are abnormal    [facility] action(s):  Defer Medication-related problems identified: Requires labs        Medication reconciliation completed: No     Appointments  past 12m or future 3m with PCP    Date Provider   Last Visit   12/28/2021 Negro Bell MD   Next Visit   Visit date not found Negro Bell MD   ED visits in past 90 days: 0        Note composed:10:17 AM 04/13/2022

## 2022-06-15 NOTE — PROGRESS NOTES
Let him know about the same except the uric acid for gout is still really elevated and he should be on duzallo to lower it so he doesn't get inflammatory arthritis, let me know if he wantsto do this.
Make him and appointment to go over labs
yes

## 2022-11-02 DIAGNOSIS — N18.31 CHRONIC KIDNEY DISEASE, STAGE 3A: ICD-10-CM

## 2023-03-15 ENCOUNTER — PATIENT OUTREACH (OUTPATIENT)
Dept: ADMINISTRATIVE | Facility: HOSPITAL | Age: 53
End: 2023-03-15
Payer: COMMERCIAL

## 2023-03-15 NOTE — PROGRESS NOTES
Health Maintenance Due   Topic Date Due    Hepatitis C Screening  Never done    HIV Screening  Never done    TETANUS VACCINE  Never done    Shingles Vaccine (1 of 2) Never done    COVID-19 Vaccine (3 - Booster for Moderna series) 06/23/2021    Influenza Vaccine (1) 09/01/2022    Hemoglobin A1c (Diabetic Prevention Screening)  10/21/2022     Chart review done. HM updated. Immunizations reviewed & updated. Care Everywhere updated.

## 2023-06-05 DIAGNOSIS — M10.072 ACUTE IDIOPATHIC GOUT INVOLVING TOE OF LEFT FOOT: ICD-10-CM

## 2023-06-05 RX ORDER — COLCHICINE 0.6 MG/1
TABLET ORAL
Qty: 30 TABLET | Refills: 2 | OUTPATIENT
Start: 2023-06-05

## 2023-06-05 NOTE — TELEPHONE ENCOUNTER
Care Due:                  Date            Visit Type   Department     Provider  --------------------------------------------------------------------------------                                             [facility]      MEDICINE/INTERN  Last Visit: 12-      AUDIO ONLY   [facility]         Negro Bell  Next Visit: None Scheduled  None         None Found                                                            Last  Test          Frequency    Reason                     Performed    Due Date  --------------------------------------------------------------------------------    Office Visit  12 months..  colchicine, omeprazole...  12- 12-    Cr..........  12 months..  colchicine...............  10-   10-    Uric Acid...  12 months..  colchicine...............  Not Found    Overdue    [employer] Embedded Care Due Messages. Reference number: [id number].   6/05/2023 8:04:14 AM CDT

## 2023-11-13 DIAGNOSIS — M25.571 RIGHT ANKLE PAIN, UNSPECIFIED CHRONICITY: Primary | ICD-10-CM

## 2023-11-15 ENCOUNTER — OFFICE VISIT (OUTPATIENT)
Dept: ORTHOPEDICS | Facility: CLINIC | Age: 53
End: 2023-11-15
Payer: COMMERCIAL

## 2023-11-15 DIAGNOSIS — M25.511 RIGHT SHOULDER PAIN, UNSPECIFIED CHRONICITY: Primary | ICD-10-CM

## 2023-11-15 PROCEDURE — 1160F PR REVIEW ALL MEDS BY PRESCRIBER/CLIN PHARMACIST DOCUMENTED: ICD-10-PCS | Mod: CPTII,S$GLB,, | Performed by: ORTHOPAEDIC SURGERY

## 2023-11-15 PROCEDURE — 1159F PR MEDICATION LIST DOCUMENTED IN MEDICAL RECORD: ICD-10-PCS | Mod: CPTII,S$GLB,, | Performed by: ORTHOPAEDIC SURGERY

## 2023-11-15 PROCEDURE — 1159F MED LIST DOCD IN RCRD: CPT | Mod: CPTII,S$GLB,, | Performed by: ORTHOPAEDIC SURGERY

## 2023-11-15 PROCEDURE — 99999 PR PBB SHADOW E&M-EST. PATIENT-LVL III: CPT | Mod: PBBFAC,,, | Performed by: ORTHOPAEDIC SURGERY

## 2023-11-15 PROCEDURE — 99213 PR OFFICE/OUTPT VISIT, EST, LEVL III, 20-29 MIN: ICD-10-PCS | Mod: S$GLB,,, | Performed by: ORTHOPAEDIC SURGERY

## 2023-11-15 PROCEDURE — 1160F RVW MEDS BY RX/DR IN RCRD: CPT | Mod: CPTII,S$GLB,, | Performed by: ORTHOPAEDIC SURGERY

## 2023-11-15 PROCEDURE — 99213 OFFICE O/P EST LOW 20 MIN: CPT | Mod: S$GLB,,, | Performed by: ORTHOPAEDIC SURGERY

## 2023-11-15 PROCEDURE — 99999 PR PBB SHADOW E&M-EST. PATIENT-LVL III: ICD-10-PCS | Mod: PBBFAC,,, | Performed by: ORTHOPAEDIC SURGERY

## 2023-11-15 RX ORDER — MELOXICAM 15 MG/1
15 TABLET ORAL DAILY
Qty: 30 TABLET | Refills: 1 | Status: SHIPPED | OUTPATIENT
Start: 2023-11-15 | End: 2024-01-30

## 2023-11-15 NOTE — PROGRESS NOTES
Assessment: 53 y.o. male with right rotator cuff tendinitis    I explained my diagnostic impression and the reasoning behind it in detail, using layman's terms.      Plan:   - Mobic 15 mg PO QD x 2 weeks then PRN. The patient was advised that NSAID-type medications have important potential side effects: gastrointestinal irritation, GI bleeding, cardiac effects and renal injuries. Take the medication with food and to stop and call the office for any GI upset, vomiting, abdominal pain or black/bloody stools. The patient expresses understanding of these issues and questions were answered.  - PT referral to [facility]  - Return to clinic in 12 weeks. Return sooner if symptoms worsen or fail to improve.    All questions were answered in detail. The patient is in full agreement with the treatment plan and will proceed accordingly.    Chief Complaint   Patient presents with    Right Shoulder - Pain       Initial visit (11/15/23): Tommy Lamar Na is a 53 y.o. male who presents today complaining of right shoulder pain  Duration of symptoms: about 6 months   Had a fall in 2015 and had shoulder pain, resolved after about 8 weeks of PT   Trauma or new activity: no new   Pain is intermittent  Aggravating factors: reaching overhead, sleeping  Relieving factors: rest  Night pain is present and is disruptive to sleep  Radicular symptoms: no numbness, paresthesias   Associated symptoms:  limited range of motion.    Prior treatment:  Ibuprofen 800 mg is helpful. Had some leftover from a dental procedure - took 7 or 8 over the last 3 weeks   Pain does interfere with activities of daily living .    This is the extent of the patient's complaints at this time.     Hand dominance: Right     Occupation:        Review of patient's allergies indicates:   Allergen Reactions    Pcn [penicillins] Rash       Physical Exam:   Vitals:    11/15/23 0815   PainSc:   2   PainLoc: Shoulder     General: Patient is alert, awake and oriented to time,  place and person. Mood and affect are appropriate.  Patient does not appear to be in any distress, denies any constitutional symptoms and appears stated age.   HEENT: Pupils are equal and round, sclera are not injected. External examination of ears and nose reveals no abnormalities. Cranial nerves II-X are grossly intact  Neck: examination demonstrates painless  active range of motion. Spurling's sign is negative  Skin: no rashes, abrasions or open wounds on the affected extremity   Resp: No respiratory distress or audible wheezing   CV: 2+  pulses, all extremities warm and well perfused   Right Shoulder    Shoulder Range of Motion    Right     Left   (Active/Passive)       Forward Elevation     170/170            170/170  External rotation (arm at side)  70/70             70/70   Internal rotation behind the back  L5             L5     Range of motion is painful     Scapular winging- no   Scapular dyskinesia - no    Examination of the back shows no atrophy    Acromioclavicular joint is not tender  Crossbody test: negative    Neer's positive  Hawkin's positive    Jonathan's positive  Drop arm negative  Belly press negative      Cuff Strength     Right     Left   Supraspinatus        4/5    5/5  Infraspinatus     5/5    5/5  Subscapularis     5/5    5/5    Deltoid testing            5/5    5/5    Mosher's test negative  Speeds negative  Yergasons negative    Elbow examination demonstrates no tenderness to palpation and has normal range of motion.     ltsi C5-T1  + epl, io, fds, fdp   2+ RP      Imaging:  3 views of the right shoulder:  positive for degenerative changes of the AC joint. The humeral head is well centered on the AP and axillary views.  Mild degenerative changes with osteophyte formation at the superior, inferior and anterior articular margins. Preserved joint space. No acute changes or fracture.      I personally reviewed and interpreted the patient's imaging obtained today in clinic     This note was  created by combination of typed  and M-Modal dictation. Transcription and phonetic errors may be present.  If there are any questions, please contact me.      Current Outpatient Medications:     metoprolol tartrate (LOPRESSOR) 25 MG tablet, TAKE ONE-HALF TABLET BY MOUTH EVERY EVENING, Disp: 45 tablet, Rfl: 3    omeprazole (PRILOSEC) 40 MG capsule, TAKE ONE CAPSULE BY MOUTH DAILY *DO NOT CHEW/CRUSH/GRIND*, Disp: 90 capsule, Rfl: 3    allopurinoL (ZYLOPRIM) 100 MG tablet, Take 1 tablet (100 mg total) by mouth once daily., Disp: 90 tablet, Rfl: 3    colchicine (COLCRYS) 0.6 mg tablet, TAKE 2 TABLETS BY MOUTH AT FLARE UP & 1 TABLET ONE HOUR LATER. REPEAT IN 24 HOURS AS NEEDED, Disp: 30 tablet, Rfl: 2    ibuprofen (ADVIL,MOTRIN) 600 MG tablet, Take 1 tablet (600 mg total) by mouth every 6 (six) hours as needed for Pain., Disp: 40 tablet, Rfl: 1  No current facility-administered medications for this visit.    Facility-Administered Medications Ordered in Other Visits:     lactated ringers infusion, , Intravenous, Continuous, Leena Lennon MD, Stopped at 07/29/20 1915    lidocaine (PF) 10 mg/ml (1%) injection 10 mg, 1 mL, Intradermal, Once, Leena Lennon MD    Past Medical History:   Diagnosis Date    Gout     Hypertension     Kidney stones     Nephrolithiasis     PSVT (paroxysmal supraventricular tachycardia) 12/2019       Active Problem List with Overview Notes    Diagnosis Date Noted    Chronic kidney disease, stage 3a 12/28/2021    Acute right ankle pain 09/21/2020    Decreased range of motion of right ankle 09/21/2020    Decreased strength, endurance, and mobility 09/21/2020    Difficulty walking 09/21/2020    Closed bimalleolar fracture of right ankle     Ankle fracture, right 07/23/2020    Atrial septal aneurysm 04/24/2019    PSVT (paroxysmal supraventricular tachycardia) 01/09/2019    GERD (gastroesophageal reflux disease) 04/11/2018    Gout     Nephrolithiasis     Decreased functional mobility  03/27/2015    Right shoulder pain 03/27/2015    Scapular dyskinesis 03/27/2015    Shoulder weakness 03/27/2015    Right shoulder injury 03/27/2015       Past Surgical History:   Procedure Laterality Date    HERNIA REPAIR      KIDNEY SURGERY      OPEN REDUCTION AND INTERNAL FIXATION (ORIF) OF INJURY OF ANKLE Right 7/29/2020    Procedure: ORIF, ANKLE  (BIMALLEOLAR ANKLE FRACTURE);  Surgeon: Christy Workman MD;  Location: [facility];  Service: Orthopedics;  Laterality: Right;  RN PREOP 7/27/2020,-- COVID NEGATIVE ON 7/27--, has PCP clearance Dr. Bell 7/24/2020  SYNTHES CHUY RANKIN [phone] TEXTED HIM @ 6:57AM ON 7-    TONSILLECTOMY         Social History     Socioeconomic History    Marital status:    Tobacco Use    Smoking status: Never    Smokeless tobacco: Never   Substance and Sexual Activity    Alcohol use: No    Drug use: No    Sexual activity: Yes     Partners: Female     Birth control/protection: None     Social Determinants of Health     Financial Resource Strain: Low Risk  (11/14/2023)    Overall Financial Resource Strain (CARDIA)     Difficulty of Paying Living Expenses: Not hard at all   Food Insecurity: No Food Insecurity (11/14/2023)    Hunger Vital Sign     Worried About Running Out of Food in the Last Year: Never true     Ran Out of Food in the Last Year: Never true   Transportation Needs: No Transportation Needs (11/14/2023)    PRAPARE - Transportation     Lack of Transportation (Medical): No     Lack of Transportation (Non-Medical): No   Physical Activity: Sufficiently Active (11/14/2023)    Exercise Vital Sign     Days of Exercise per Week: 6 days     Minutes of Exercise per Session: 60 min   Stress: No Stress Concern Present (11/14/2023)    [employer] [place] [employer] - Occupational Stress Questionnaire     Feeling of Stress : Not at all   Social Connections: Unknown (11/14/2023)    Social Connection and Isolation Panel [NHANES]     Frequency of Communication with  Friends and Family: More than three times a week     Frequency of Social Gatherings with Friends and Family: More than three times a week     Active Member of Clubs or Organizations: No     Attends Club or Organization Meetings: Never     Marital Status:    Housing Stability: Low Risk  (11/14/2023)    Housing Stability Vital Sign     Unable to Pay for Housing in the Last Year: No     Number of Places Lived in the Last Year: 1     Unstable Housing in the Last Year: No

## 2024-01-30 RX ORDER — MELOXICAM 7.5 MG/1
7.5 TABLET ORAL DAILY
Qty: 30 TABLET | Refills: 1 | Status: SHIPPED | OUTPATIENT
Start: 2024-01-30

## 2024-08-12 RX ORDER — MELOXICAM 7.5 MG/1
7.5 TABLET ORAL
Qty: 30 TABLET | Refills: 1 | Status: SHIPPED | OUTPATIENT
Start: 2024-08-12

## (undated) DEVICE — SLEEVE SCD EXPRESS CALF MEDIUM

## (undated) DEVICE — SEE MEDLINE ITEM 146345

## (undated) DEVICE — DEVICE PUSH PULL REDUC 4.5

## (undated) DEVICE — PAD PREP 50/CA

## (undated) DEVICE — SUT VICRYL 3-0 OB 36 CT-1

## (undated) DEVICE — SEE L#120831

## (undated) DEVICE — PAD CAST SPECIALIST STRL 6

## (undated) DEVICE — SEE MEDLINE ITEM 146231

## (undated) DEVICE — BLADE SURG STAINLESS STEEL #15

## (undated) DEVICE — BIT DRILL 2.8 W/QC PERCU 200MM

## (undated) DEVICE — DRESSING TRANS 4X4 TEGADERM

## (undated) DEVICE — PACK ARTHROSCOPY W/ISO BAC

## (undated) DEVICE — BIT DRILL 3.5MM 110MM STERILE

## (undated) DEVICE — DRAPE C-ARM FOR MOBILE XRAY

## (undated) DEVICE — SPLINT WRST COCKUP RIGHT UNIV

## (undated) DEVICE — SEE MEDLINE ITEM 152622

## (undated) DEVICE — BIT BRILL 2.5

## (undated) DEVICE — APPLICATOR CHLORAPREP ORN 26ML

## (undated) DEVICE — Device

## (undated) DEVICE — SCREW CORTEX 3.5 38M
Type: IMPLANTABLE DEVICE | Site: ANKLE | Status: NON-FUNCTIONAL
Removed: 2020-07-29

## (undated) DEVICE — SUT VICRYL PLUS 2-0 CT1 18

## (undated) DEVICE — SEE MEDLINE ITEM 152529

## (undated) DEVICE — SUT ETHILON 3/0 18IN PS-1

## (undated) DEVICE — TAPE ADH MEDIPORE 4 X 10YDS

## (undated) DEVICE — UNDERGLOVE BIOGEL PI SZ 6.5 LF

## (undated) DEVICE — ELECTRODE REM PLYHSV RETURN 9

## (undated) DEVICE — GAUZE SPONGE 4X4 12PLY

## (undated) DEVICE — CANISTER SUCTION 2 LTR

## (undated) DEVICE — DRAPE C-ARMOR EQUIPMENT COVER

## (undated) DEVICE — BANDAGE ESMARK 6X12

## (undated) DEVICE — SEE MEDLINE ITEM 157117

## (undated) DEVICE — TOURNIQUET SB QC DP 34X4IN

## (undated) DEVICE — GLOVE BIOGEL PI MICRO SZ 6.5